# Patient Record
Sex: FEMALE | Race: WHITE | NOT HISPANIC OR LATINO | Employment: OTHER
[De-identification: names, ages, dates, MRNs, and addresses within clinical notes are randomized per-mention and may not be internally consistent; named-entity substitution may affect disease eponyms.]

---

## 2016-06-14 LAB
HPV_EXT - HISTORICAL: NORMAL
PAP SMEAR - HIM PATIENT REPORTED: NORMAL

## 2017-05-09 ENCOUNTER — RECORDS - HEALTHEAST (OUTPATIENT)
Dept: ADMINISTRATIVE | Facility: OTHER | Age: 57
End: 2017-05-09

## 2017-05-22 ENCOUNTER — RECORDS - HEALTHEAST (OUTPATIENT)
Dept: ADMINISTRATIVE | Facility: OTHER | Age: 57
End: 2017-05-22

## 2017-07-10 ENCOUNTER — RECORDS - HEALTHEAST (OUTPATIENT)
Dept: ADMINISTRATIVE | Facility: OTHER | Age: 57
End: 2017-07-10

## 2017-07-14 ENCOUNTER — RECORDS - HEALTHEAST (OUTPATIENT)
Dept: ADMINISTRATIVE | Facility: OTHER | Age: 57
End: 2017-07-14

## 2017-10-25 ENCOUNTER — RECORDS - HEALTHEAST (OUTPATIENT)
Dept: ADMINISTRATIVE | Facility: OTHER | Age: 57
End: 2017-10-25

## 2017-12-08 ENCOUNTER — RECORDS - HEALTHEAST (OUTPATIENT)
Dept: ADMINISTRATIVE | Facility: OTHER | Age: 57
End: 2017-12-08

## 2018-01-17 ENCOUNTER — RECORDS - HEALTHEAST (OUTPATIENT)
Dept: ADMINISTRATIVE | Facility: OTHER | Age: 58
End: 2018-01-17

## 2018-04-03 ENCOUNTER — RECORDS - HEALTHEAST (OUTPATIENT)
Dept: ADMINISTRATIVE | Facility: OTHER | Age: 58
End: 2018-04-03

## 2018-04-03 LAB
HPV_EXT - HISTORICAL: NORMAL
PAP SMEAR - HIM PATIENT REPORTED: NORMAL

## 2018-04-13 ENCOUNTER — RECORDS - HEALTHEAST (OUTPATIENT)
Dept: ADMINISTRATIVE | Facility: OTHER | Age: 58
End: 2018-04-13

## 2018-04-17 ENCOUNTER — RECORDS - HEALTHEAST (OUTPATIENT)
Dept: ADMINISTRATIVE | Facility: OTHER | Age: 58
End: 2018-04-17

## 2018-04-27 ENCOUNTER — RECORDS - HEALTHEAST (OUTPATIENT)
Dept: ADMINISTRATIVE | Facility: OTHER | Age: 58
End: 2018-04-27

## 2018-07-10 ENCOUNTER — RECORDS - HEALTHEAST (OUTPATIENT)
Dept: ADMINISTRATIVE | Facility: OTHER | Age: 58
End: 2018-07-10

## 2018-07-15 ENCOUNTER — RECORDS - HEALTHEAST (OUTPATIENT)
Dept: ADMINISTRATIVE | Facility: OTHER | Age: 58
End: 2018-07-15

## 2018-09-21 ENCOUNTER — RECORDS - HEALTHEAST (OUTPATIENT)
Dept: ADMINISTRATIVE | Facility: OTHER | Age: 58
End: 2018-09-21

## 2018-10-11 ENCOUNTER — RECORDS - HEALTHEAST (OUTPATIENT)
Dept: ADMINISTRATIVE | Facility: OTHER | Age: 58
End: 2018-10-11

## 2019-01-04 ENCOUNTER — OFFICE VISIT - HEALTHEAST (OUTPATIENT)
Dept: FAMILY MEDICINE | Facility: CLINIC | Age: 59
End: 2019-01-04

## 2019-01-04 DIAGNOSIS — N95.1 MENOPAUSAL AND FEMALE CLIMACTERIC STATES: ICD-10-CM

## 2019-01-04 DIAGNOSIS — M54.41 CHRONIC BILATERAL LOW BACK PAIN WITH RIGHT-SIDED SCIATICA: ICD-10-CM

## 2019-01-04 DIAGNOSIS — Q52.0 CONGENITAL ABSENCE OF VAGINA: ICD-10-CM

## 2019-01-04 DIAGNOSIS — K59.09 CHRONIC CONSTIPATION: ICD-10-CM

## 2019-01-04 DIAGNOSIS — M79.641 PAIN OF RIGHT HAND: ICD-10-CM

## 2019-01-04 DIAGNOSIS — I73.00 RAYNAUD'S DISEASE WITHOUT GANGRENE: ICD-10-CM

## 2019-01-04 DIAGNOSIS — G89.29 CHRONIC BILATERAL LOW BACK PAIN WITH RIGHT-SIDED SCIATICA: ICD-10-CM

## 2019-01-04 LAB
AMPHETAMINES UR QL SCN: NORMAL
BARBITURATES UR QL: NORMAL
BENZODIAZ UR QL: NORMAL
CANNABINOIDS UR QL SCN: NORMAL
COCAINE UR QL: NORMAL
CREAT UR-MCNC: 86.1 MG/DL
METHADONE UR QL SCN: NORMAL
OPIATES UR QL SCN: NORMAL
OXYCODONE UR QL: NORMAL
PCP UR QL SCN: NORMAL

## 2019-01-04 RX ORDER — GABAPENTIN 100 MG/1
200-300 CAPSULE ORAL AT BEDTIME
Refills: 0 | Status: SHIPPED | COMMUNITY
Start: 2018-12-05 | End: 2021-08-16

## 2019-01-04 ASSESSMENT — MIFFLIN-ST. JEOR: SCORE: 1139.51

## 2019-01-09 ENCOUNTER — COMMUNICATION - HEALTHEAST (OUTPATIENT)
Dept: FAMILY MEDICINE | Facility: CLINIC | Age: 59
End: 2019-01-09

## 2019-01-24 ENCOUNTER — AMBULATORY - HEALTHEAST (OUTPATIENT)
Dept: LAB | Facility: HOSPITAL | Age: 59
End: 2019-01-24

## 2019-01-24 DIAGNOSIS — M54.89 OTHER BACK PAIN, UNSPECIFIED CHRONICITY: ICD-10-CM

## 2019-01-24 DIAGNOSIS — M06.4 INFLAMMATORY POLYARTHROPATHY (H): ICD-10-CM

## 2019-01-24 LAB
C REACTIVE PROTEIN LHE: <0.1 MG/DL (ref 0–0.8)
ERYTHROCYTE [DISTWIDTH] IN BLOOD BY AUTOMATED COUNT: 11.9 % (ref 11–14.5)
ERYTHROCYTE [SEDIMENTATION RATE] IN BLOOD BY WESTERGREN METHOD: 4 MM/HR (ref 0–20)
HCT VFR BLD AUTO: 37.2 % (ref 35–47)
HGB BLD-MCNC: 13.2 G/DL (ref 12–16)
MCH RBC QN AUTO: 32.4 PG (ref 27–34)
MCHC RBC AUTO-ENTMCNC: 35.5 G/DL (ref 32–36)
MCV RBC AUTO: 91 FL (ref 80–100)
PLATELET # BLD AUTO: 274 THOU/UL (ref 140–440)
PMV BLD AUTO: 9.9 FL (ref 8.5–12.5)
RBC # BLD AUTO: 4.08 MILL/UL (ref 3.8–5.4)
WBC: 6 THOU/UL (ref 4–11)

## 2019-01-31 ENCOUNTER — RECORDS - HEALTHEAST (OUTPATIENT)
Dept: ADMINISTRATIVE | Facility: OTHER | Age: 59
End: 2019-01-31

## 2019-01-31 LAB
B LOCUS: NORMAL
B27TEST METHOD: NORMAL

## 2019-02-11 ENCOUNTER — AMBULATORY - HEALTHEAST (OUTPATIENT)
Dept: FAMILY MEDICINE | Facility: CLINIC | Age: 59
End: 2019-02-11

## 2019-02-11 DIAGNOSIS — R92.8 ABNORMAL MAMMOGRAM: ICD-10-CM

## 2019-02-18 ENCOUNTER — COMMUNICATION - HEALTHEAST (OUTPATIENT)
Dept: FAMILY MEDICINE | Facility: CLINIC | Age: 59
End: 2019-02-18

## 2019-02-19 ENCOUNTER — RECORDS - HEALTHEAST (OUTPATIENT)
Dept: HEALTH INFORMATION MANAGEMENT | Facility: CLINIC | Age: 59
End: 2019-02-19

## 2019-03-08 ENCOUNTER — AMBULATORY - HEALTHEAST (OUTPATIENT)
Dept: LAB | Facility: HOSPITAL | Age: 59
End: 2019-03-08

## 2019-03-08 DIAGNOSIS — M06.4 INFLAMMATORY POLYARTHROPATHY (H): ICD-10-CM

## 2019-03-08 LAB — URATE SERPL-MCNC: 3.9 MG/DL (ref 2–7.5)

## 2019-03-11 LAB — ANA SER QL: 3.6 U

## 2019-03-12 LAB — DNA (DS) ANTIBODY - HISTORICAL: 7 IU

## 2019-03-19 LAB
JO-1 AUTOANTIBODIES - HISTORICAL: 3 EU
SCL-70 AUTOANTIBODIES - HISTORICAL: 1 EU
SM (SMITH AUTOANTIBODIES - HISTORICAL: 20 EU
SM/RNP AUTOANTIBODIES - HISTORICAL: 2 EU
SS-A/RO AUTOANTIBODIES - HISTORICAL: 12 EU
SS-B/LA AUTOANTIBODIES - HISTORICAL: 2 EU

## 2019-03-22 ENCOUNTER — COMMUNICATION - HEALTHEAST (OUTPATIENT)
Dept: FAMILY MEDICINE | Facility: CLINIC | Age: 59
End: 2019-03-22

## 2019-03-22 DIAGNOSIS — G89.29 CHRONIC BILATERAL LOW BACK PAIN WITH RIGHT-SIDED SCIATICA: ICD-10-CM

## 2019-03-22 DIAGNOSIS — M54.41 CHRONIC BILATERAL LOW BACK PAIN WITH RIGHT-SIDED SCIATICA: ICD-10-CM

## 2019-04-11 ENCOUNTER — HOSPITAL ENCOUNTER (OUTPATIENT)
Dept: MAMMOGRAPHY | Facility: CLINIC | Age: 59
Discharge: HOME | End: 2019-04-11

## 2019-04-11 DIAGNOSIS — R92.8 ABNORMAL MAMMOGRAM: ICD-10-CM

## 2019-04-12 ENCOUNTER — RECORDS - HEALTHEAST (OUTPATIENT)
Dept: RADIOLOGY | Facility: CLINIC | Age: 59
End: 2019-04-12

## 2019-04-12 ENCOUNTER — HOSPITAL ENCOUNTER (OUTPATIENT)
Dept: MAMMOGRAPHY | Facility: CLINIC | Age: 59
Discharge: HOME | End: 2019-04-12

## 2019-04-15 ENCOUNTER — AMBULATORY - HEALTHEAST (OUTPATIENT)
Dept: FAMILY MEDICINE | Facility: CLINIC | Age: 59
End: 2019-04-15

## 2019-04-15 DIAGNOSIS — R92.8 ABNORMAL MAMMOGRAM: ICD-10-CM

## 2019-04-16 ENCOUNTER — COMMUNICATION - HEALTHEAST (OUTPATIENT)
Dept: FAMILY MEDICINE | Facility: CLINIC | Age: 59
End: 2019-04-16

## 2019-05-13 ENCOUNTER — OFFICE VISIT - HEALTHEAST (OUTPATIENT)
Dept: FAMILY MEDICINE | Facility: CLINIC | Age: 59
End: 2019-05-13

## 2019-05-13 DIAGNOSIS — R92.8 ABNORMAL MAMMOGRAM: ICD-10-CM

## 2019-05-13 DIAGNOSIS — G89.29 CHRONIC BILATERAL LOW BACK PAIN WITH RIGHT-SIDED SCIATICA: ICD-10-CM

## 2019-05-13 DIAGNOSIS — M54.41 CHRONIC BILATERAL LOW BACK PAIN WITH RIGHT-SIDED SCIATICA: ICD-10-CM

## 2019-05-13 DIAGNOSIS — Z12.11 SCREEN FOR COLON CANCER: ICD-10-CM

## 2019-05-13 DIAGNOSIS — R42 VERTIGO: ICD-10-CM

## 2019-05-13 ASSESSMENT — MIFFLIN-ST. JEOR: SCORE: 1151.02

## 2019-05-16 ENCOUNTER — OFFICE VISIT - HEALTHEAST (OUTPATIENT)
Dept: FAMILY MEDICINE | Facility: CLINIC | Age: 59
End: 2019-05-16

## 2019-05-16 DIAGNOSIS — Z12.11 SPECIAL SCREENING FOR MALIGNANT NEOPLASMS, COLON: ICD-10-CM

## 2019-05-16 DIAGNOSIS — L81.4 LENTIGO: ICD-10-CM

## 2019-05-16 DIAGNOSIS — Z00.00 ANNUAL PHYSICAL EXAM: ICD-10-CM

## 2019-05-16 LAB
ALBUMIN SERPL-MCNC: 4.2 G/DL (ref 3.5–5)
ALP SERPL-CCNC: 73 U/L (ref 45–120)
ALT SERPL W P-5'-P-CCNC: <9 U/L (ref 0–45)
ANION GAP SERPL CALCULATED.3IONS-SCNC: 11 MMOL/L (ref 5–18)
AST SERPL W P-5'-P-CCNC: 19 U/L (ref 0–40)
BILIRUB SERPL-MCNC: 0.4 MG/DL (ref 0–1)
BUN SERPL-MCNC: 13 MG/DL (ref 8–22)
CALCIUM SERPL-MCNC: 9.6 MG/DL (ref 8.5–10.5)
CHLORIDE BLD-SCNC: 105 MMOL/L (ref 98–107)
CHOLEST SERPL-MCNC: 199 MG/DL
CO2 SERPL-SCNC: 23 MMOL/L (ref 22–31)
CREAT SERPL-MCNC: 0.69 MG/DL (ref 0.6–1.1)
FASTING STATUS PATIENT QL REPORTED: YES
GFR SERPL CREATININE-BSD FRML MDRD: >60 ML/MIN/1.73M2
GLUCOSE BLD-MCNC: 94 MG/DL (ref 70–125)
HDLC SERPL-MCNC: 82 MG/DL
LDLC SERPL CALC-MCNC: 104 MG/DL
POTASSIUM BLD-SCNC: 4.1 MMOL/L (ref 3.5–5)
PROT SERPL-MCNC: 6.7 G/DL (ref 6–8)
SODIUM SERPL-SCNC: 139 MMOL/L (ref 136–145)
TRIGL SERPL-MCNC: 65 MG/DL

## 2019-05-16 ASSESSMENT — MIFFLIN-ST. JEOR: SCORE: 1151.02

## 2019-05-20 ENCOUNTER — COMMUNICATION - HEALTHEAST (OUTPATIENT)
Dept: FAMILY MEDICINE | Facility: CLINIC | Age: 59
End: 2019-05-20

## 2019-06-20 ENCOUNTER — RECORDS - HEALTHEAST (OUTPATIENT)
Dept: ADMINISTRATIVE | Facility: OTHER | Age: 59
End: 2019-06-20

## 2019-06-27 ENCOUNTER — COMMUNICATION - HEALTHEAST (OUTPATIENT)
Dept: FAMILY MEDICINE | Facility: CLINIC | Age: 59
End: 2019-06-27

## 2019-06-27 DIAGNOSIS — G89.29 CHRONIC BILATERAL LOW BACK PAIN WITH RIGHT-SIDED SCIATICA: ICD-10-CM

## 2019-06-27 DIAGNOSIS — M54.41 CHRONIC BILATERAL LOW BACK PAIN WITH RIGHT-SIDED SCIATICA: ICD-10-CM

## 2019-08-08 ENCOUNTER — COMMUNICATION - HEALTHEAST (OUTPATIENT)
Dept: FAMILY MEDICINE | Facility: CLINIC | Age: 59
End: 2019-08-08

## 2019-08-08 DIAGNOSIS — G89.29 CHRONIC BILATERAL LOW BACK PAIN WITH RIGHT-SIDED SCIATICA: ICD-10-CM

## 2019-08-08 DIAGNOSIS — M54.41 CHRONIC BILATERAL LOW BACK PAIN WITH RIGHT-SIDED SCIATICA: ICD-10-CM

## 2019-09-05 ENCOUNTER — COMMUNICATION - HEALTHEAST (OUTPATIENT)
Dept: FAMILY MEDICINE | Facility: CLINIC | Age: 59
End: 2019-09-05

## 2019-09-05 DIAGNOSIS — F32.A DEPRESSION: ICD-10-CM

## 2019-09-18 ENCOUNTER — COMMUNICATION - HEALTHEAST (OUTPATIENT)
Dept: FAMILY MEDICINE | Facility: CLINIC | Age: 59
End: 2019-09-18

## 2019-09-18 DIAGNOSIS — M54.41 CHRONIC BILATERAL LOW BACK PAIN WITH RIGHT-SIDED SCIATICA: ICD-10-CM

## 2019-09-18 DIAGNOSIS — G89.29 CHRONIC BILATERAL LOW BACK PAIN WITH RIGHT-SIDED SCIATICA: ICD-10-CM

## 2019-10-25 ENCOUNTER — COMMUNICATION - HEALTHEAST (OUTPATIENT)
Dept: FAMILY MEDICINE | Facility: CLINIC | Age: 59
End: 2019-10-25

## 2019-10-25 DIAGNOSIS — M54.41 CHRONIC BILATERAL LOW BACK PAIN WITH RIGHT-SIDED SCIATICA: ICD-10-CM

## 2019-10-25 DIAGNOSIS — G89.29 CHRONIC BILATERAL LOW BACK PAIN WITH RIGHT-SIDED SCIATICA: ICD-10-CM

## 2019-11-01 ENCOUNTER — COMMUNICATION - HEALTHEAST (OUTPATIENT)
Dept: FAMILY MEDICINE | Facility: CLINIC | Age: 59
End: 2019-11-01

## 2019-11-07 ENCOUNTER — OFFICE VISIT - HEALTHEAST (OUTPATIENT)
Dept: FAMILY MEDICINE | Facility: CLINIC | Age: 59
End: 2019-11-07

## 2019-11-07 DIAGNOSIS — Z79.899 CONTROLLED SUBSTANCE AGREEMENT SIGNED: ICD-10-CM

## 2019-11-07 DIAGNOSIS — M54.41 CHRONIC BILATERAL LOW BACK PAIN WITH RIGHT-SIDED SCIATICA: ICD-10-CM

## 2019-11-07 DIAGNOSIS — R21 RASH AND NONSPECIFIC SKIN ERUPTION: ICD-10-CM

## 2019-11-07 DIAGNOSIS — G89.29 CHRONIC BILATERAL LOW BACK PAIN WITH RIGHT-SIDED SCIATICA: ICD-10-CM

## 2019-11-07 LAB
AMPHETAMINES UR QL SCN: NORMAL
BARBITURATES UR QL: NORMAL
BENZODIAZ UR QL: NORMAL
CANNABINOIDS UR QL SCN: NORMAL
COCAINE UR QL: NORMAL
CREAT UR-MCNC: 12.6 MG/DL
METHADONE UR QL SCN: NORMAL
OPIATES UR QL SCN: NORMAL
OXYCODONE UR QL: NORMAL
PCP UR QL SCN: NORMAL

## 2019-11-07 ASSESSMENT — ANXIETY QUESTIONNAIRES
4. TROUBLE RELAXING: NOT AT ALL
2. NOT BEING ABLE TO STOP OR CONTROL WORRYING: NOT AT ALL
6. BECOMING EASILY ANNOYED OR IRRITABLE: NOT AT ALL
7. FEELING AFRAID AS IF SOMETHING AWFUL MIGHT HAPPEN: NOT AT ALL
3. WORRYING TOO MUCH ABOUT DIFFERENT THINGS: NOT AT ALL
5. BEING SO RESTLESS THAT IT IS HARD TO SIT STILL: NOT AT ALL
1. FEELING NERVOUS, ANXIOUS, OR ON EDGE: NOT AT ALL
GAD7 TOTAL SCORE: 0

## 2019-11-07 ASSESSMENT — PATIENT HEALTH QUESTIONNAIRE - PHQ9: SUM OF ALL RESPONSES TO PHQ QUESTIONS 1-9: 1

## 2019-12-03 ENCOUNTER — COMMUNICATION - HEALTHEAST (OUTPATIENT)
Dept: FAMILY MEDICINE | Facility: CLINIC | Age: 59
End: 2019-12-03

## 2019-12-03 DIAGNOSIS — G89.29 CHRONIC BILATERAL LOW BACK PAIN WITH RIGHT-SIDED SCIATICA: ICD-10-CM

## 2019-12-03 DIAGNOSIS — M54.41 CHRONIC BILATERAL LOW BACK PAIN WITH RIGHT-SIDED SCIATICA: ICD-10-CM

## 2020-01-09 ENCOUNTER — COMMUNICATION - HEALTHEAST (OUTPATIENT)
Dept: FAMILY MEDICINE | Facility: CLINIC | Age: 60
End: 2020-01-09

## 2020-01-09 DIAGNOSIS — M54.41 CHRONIC BILATERAL LOW BACK PAIN WITH RIGHT-SIDED SCIATICA: ICD-10-CM

## 2020-01-09 DIAGNOSIS — G89.29 CHRONIC BILATERAL LOW BACK PAIN WITH RIGHT-SIDED SCIATICA: ICD-10-CM

## 2020-02-11 ENCOUNTER — COMMUNICATION - HEALTHEAST (OUTPATIENT)
Dept: FAMILY MEDICINE | Facility: CLINIC | Age: 60
End: 2020-02-11

## 2020-02-11 DIAGNOSIS — M54.41 CHRONIC BILATERAL LOW BACK PAIN WITH RIGHT-SIDED SCIATICA: ICD-10-CM

## 2020-02-11 DIAGNOSIS — G89.29 CHRONIC BILATERAL LOW BACK PAIN WITH RIGHT-SIDED SCIATICA: ICD-10-CM

## 2020-02-26 ENCOUNTER — COMMUNICATION - HEALTHEAST (OUTPATIENT)
Dept: FAMILY MEDICINE | Facility: CLINIC | Age: 60
End: 2020-02-26

## 2020-03-16 ENCOUNTER — COMMUNICATION - HEALTHEAST (OUTPATIENT)
Dept: FAMILY MEDICINE | Facility: CLINIC | Age: 60
End: 2020-03-16

## 2020-03-16 DIAGNOSIS — M54.41 CHRONIC BILATERAL LOW BACK PAIN WITH RIGHT-SIDED SCIATICA: ICD-10-CM

## 2020-03-16 DIAGNOSIS — G89.29 CHRONIC BILATERAL LOW BACK PAIN WITH RIGHT-SIDED SCIATICA: ICD-10-CM

## 2020-04-02 ENCOUNTER — RECORDS - HEALTHEAST (OUTPATIENT)
Dept: ADMINISTRATIVE | Facility: OTHER | Age: 60
End: 2020-04-02

## 2020-04-20 ENCOUNTER — COMMUNICATION - HEALTHEAST (OUTPATIENT)
Dept: FAMILY MEDICINE | Facility: CLINIC | Age: 60
End: 2020-04-20

## 2020-04-20 DIAGNOSIS — M54.41 CHRONIC BILATERAL LOW BACK PAIN WITH RIGHT-SIDED SCIATICA: ICD-10-CM

## 2020-04-20 DIAGNOSIS — G89.29 CHRONIC BILATERAL LOW BACK PAIN WITH RIGHT-SIDED SCIATICA: ICD-10-CM

## 2020-05-07 ENCOUNTER — RECORDS - HEALTHEAST (OUTPATIENT)
Dept: ADMINISTRATIVE | Facility: OTHER | Age: 60
End: 2020-05-07

## 2020-05-26 ENCOUNTER — COMMUNICATION - HEALTHEAST (OUTPATIENT)
Dept: FAMILY MEDICINE | Facility: CLINIC | Age: 60
End: 2020-05-26

## 2020-05-26 DIAGNOSIS — M54.41 CHRONIC BILATERAL LOW BACK PAIN WITH RIGHT-SIDED SCIATICA: ICD-10-CM

## 2020-05-26 DIAGNOSIS — G89.29 CHRONIC BILATERAL LOW BACK PAIN WITH RIGHT-SIDED SCIATICA: ICD-10-CM

## 2020-06-17 ENCOUNTER — COMMUNICATION - HEALTHEAST (OUTPATIENT)
Dept: FAMILY MEDICINE | Facility: CLINIC | Age: 60
End: 2020-06-17

## 2020-06-17 DIAGNOSIS — F32.A DEPRESSION: ICD-10-CM

## 2020-06-26 ENCOUNTER — COMMUNICATION - HEALTHEAST (OUTPATIENT)
Dept: FAMILY MEDICINE | Facility: CLINIC | Age: 60
End: 2020-06-26

## 2020-06-26 DIAGNOSIS — M54.41 CHRONIC BILATERAL LOW BACK PAIN WITH RIGHT-SIDED SCIATICA: ICD-10-CM

## 2020-06-26 DIAGNOSIS — G89.29 CHRONIC BILATERAL LOW BACK PAIN WITH RIGHT-SIDED SCIATICA: ICD-10-CM

## 2020-07-02 ENCOUNTER — OFFICE VISIT - HEALTHEAST (OUTPATIENT)
Dept: FAMILY MEDICINE | Facility: CLINIC | Age: 60
End: 2020-07-02

## 2020-07-02 DIAGNOSIS — F41.9 ANXIETY: ICD-10-CM

## 2020-07-02 DIAGNOSIS — G89.29 CHRONIC BILATERAL LOW BACK PAIN WITH RIGHT-SIDED SCIATICA: ICD-10-CM

## 2020-07-02 DIAGNOSIS — M25.50 POLYARTHRALGIA: ICD-10-CM

## 2020-07-02 DIAGNOSIS — I73.00 RAYNAUD'S DISEASE WITHOUT GANGRENE: ICD-10-CM

## 2020-07-02 DIAGNOSIS — M54.41 CHRONIC BILATERAL LOW BACK PAIN WITH RIGHT-SIDED SCIATICA: ICD-10-CM

## 2020-07-02 RX ORDER — ESTRADIOL 0.05 MG/D
1 PATCH, EXTENDED RELEASE TRANSDERMAL
Status: SHIPPED | COMMUNITY
Start: 2020-07-02 | End: 2024-03-25

## 2020-07-02 ASSESSMENT — ANXIETY QUESTIONNAIRES
GAD7 TOTAL SCORE: 0
3. WORRYING TOO MUCH ABOUT DIFFERENT THINGS: NOT AT ALL
2. NOT BEING ABLE TO STOP OR CONTROL WORRYING: NOT AT ALL
1. FEELING NERVOUS, ANXIOUS, OR ON EDGE: NOT AT ALL
6. BECOMING EASILY ANNOYED OR IRRITABLE: NOT AT ALL
7. FEELING AFRAID AS IF SOMETHING AWFUL MIGHT HAPPEN: NOT AT ALL
4. TROUBLE RELAXING: NOT AT ALL
5. BEING SO RESTLESS THAT IT IS HARD TO SIT STILL: NOT AT ALL

## 2020-07-02 ASSESSMENT — PATIENT HEALTH QUESTIONNAIRE - PHQ9: SUM OF ALL RESPONSES TO PHQ QUESTIONS 1-9: 0

## 2020-07-03 ENCOUNTER — COMMUNICATION - HEALTHEAST (OUTPATIENT)
Dept: FAMILY MEDICINE | Facility: CLINIC | Age: 60
End: 2020-07-03

## 2020-07-29 ENCOUNTER — COMMUNICATION - HEALTHEAST (OUTPATIENT)
Dept: FAMILY MEDICINE | Facility: CLINIC | Age: 60
End: 2020-07-29

## 2020-07-29 DIAGNOSIS — F32.A DEPRESSION: ICD-10-CM

## 2020-07-29 DIAGNOSIS — M54.41 CHRONIC BILATERAL LOW BACK PAIN WITH RIGHT-SIDED SCIATICA: ICD-10-CM

## 2020-07-29 DIAGNOSIS — G89.29 CHRONIC BILATERAL LOW BACK PAIN WITH RIGHT-SIDED SCIATICA: ICD-10-CM

## 2020-07-30 ENCOUNTER — RECORDS - HEALTHEAST (OUTPATIENT)
Dept: ADMINISTRATIVE | Facility: OTHER | Age: 60
End: 2020-07-30

## 2020-08-27 ENCOUNTER — OFFICE VISIT - HEALTHEAST (OUTPATIENT)
Dept: RHEUMATOLOGY | Facility: CLINIC | Age: 60
End: 2020-08-27

## 2020-09-03 ENCOUNTER — COMMUNICATION - HEALTHEAST (OUTPATIENT)
Dept: FAMILY MEDICINE | Facility: CLINIC | Age: 60
End: 2020-09-03

## 2020-09-03 DIAGNOSIS — M54.41 CHRONIC BILATERAL LOW BACK PAIN WITH RIGHT-SIDED SCIATICA: ICD-10-CM

## 2020-09-03 DIAGNOSIS — G89.29 CHRONIC BILATERAL LOW BACK PAIN WITH RIGHT-SIDED SCIATICA: ICD-10-CM

## 2020-09-11 ENCOUNTER — OFFICE VISIT - HEALTHEAST (OUTPATIENT)
Dept: FAMILY MEDICINE | Facility: CLINIC | Age: 60
End: 2020-09-11

## 2020-09-11 DIAGNOSIS — Z98.890 PONV (POSTOPERATIVE NAUSEA AND VOMITING): ICD-10-CM

## 2020-09-11 DIAGNOSIS — Z01.818 PREOP GENERAL PHYSICAL EXAM: ICD-10-CM

## 2020-09-11 DIAGNOSIS — K59.09 CHRONIC CONSTIPATION: ICD-10-CM

## 2020-09-11 DIAGNOSIS — Z79.890 ON HORMONE REPLACEMENT THERAPY: ICD-10-CM

## 2020-09-11 DIAGNOSIS — Z79.899 CONTROLLED SUBSTANCE AGREEMENT SIGNED: ICD-10-CM

## 2020-09-11 DIAGNOSIS — R11.2 PONV (POSTOPERATIVE NAUSEA AND VOMITING): ICD-10-CM

## 2020-09-11 LAB
AMPHETAMINES UR QL SCN: NORMAL
BARBITURATES UR QL: NORMAL
BENZODIAZ UR QL: NORMAL
CANNABINOIDS UR QL SCN: NORMAL
COCAINE UR QL: NORMAL
CREAT UR-MCNC: 13.6 MG/DL
METHADONE UR QL SCN: NORMAL
OPIATES UR QL SCN: NORMAL
OXYCODONE UR QL: NORMAL
PCP UR QL SCN: NORMAL

## 2020-09-11 ASSESSMENT — MIFFLIN-ST. JEOR: SCORE: 1158.21

## 2020-09-18 ENCOUNTER — RECORDS - HEALTHEAST (OUTPATIENT)
Dept: ADMINISTRATIVE | Facility: OTHER | Age: 60
End: 2020-09-18

## 2020-09-30 ENCOUNTER — RECORDS - HEALTHEAST (OUTPATIENT)
Dept: ADMINISTRATIVE | Facility: OTHER | Age: 60
End: 2020-09-30

## 2020-10-06 ENCOUNTER — COMMUNICATION - HEALTHEAST (OUTPATIENT)
Dept: FAMILY MEDICINE | Facility: CLINIC | Age: 60
End: 2020-10-06

## 2020-10-06 DIAGNOSIS — M54.41 CHRONIC BILATERAL LOW BACK PAIN WITH RIGHT-SIDED SCIATICA: ICD-10-CM

## 2020-10-06 DIAGNOSIS — G89.29 CHRONIC BILATERAL LOW BACK PAIN WITH RIGHT-SIDED SCIATICA: ICD-10-CM

## 2020-10-07 ENCOUNTER — COMMUNICATION - HEALTHEAST (OUTPATIENT)
Dept: FAMILY MEDICINE | Facility: CLINIC | Age: 60
End: 2020-10-07

## 2020-10-22 ENCOUNTER — SURGERY - HEALTHEAST (OUTPATIENT)
Dept: SURGERY | Facility: HOSPITAL | Age: 60
End: 2020-10-22

## 2020-10-22 ENCOUNTER — ANESTHESIA - HEALTHEAST (OUTPATIENT)
Dept: SURGERY | Facility: HOSPITAL | Age: 60
End: 2020-10-22

## 2020-10-22 ASSESSMENT — MIFFLIN-ST. JEOR
SCORE: 1146.48
SCORE: 1146.48

## 2020-10-23 ENCOUNTER — COMMUNICATION - HEALTHEAST (OUTPATIENT)
Dept: SCHEDULING | Facility: CLINIC | Age: 60
End: 2020-10-23

## 2020-10-24 ASSESSMENT — MIFFLIN-ST. JEOR: SCORE: 1167.8

## 2020-10-25 ASSESSMENT — MIFFLIN-ST. JEOR: SCORE: 1180.96

## 2020-10-26 ENCOUNTER — COMMUNICATION - HEALTHEAST (OUTPATIENT)
Dept: INFECTIOUS DISEASES | Facility: CLINIC | Age: 60
End: 2020-10-26

## 2020-10-27 ENCOUNTER — RECORDS - HEALTHEAST (OUTPATIENT)
Dept: ADMINISTRATIVE | Facility: OTHER | Age: 60
End: 2020-10-27

## 2020-10-29 ENCOUNTER — RECORDS - HEALTHEAST (OUTPATIENT)
Dept: LAB | Facility: CLINIC | Age: 60
End: 2020-10-29

## 2020-10-29 LAB
ANION GAP SERPL CALCULATED.3IONS-SCNC: 11 MMOL/L (ref 5–18)
BASOPHILS # BLD AUTO: 0 THOU/UL (ref 0–0.2)
BASOPHILS NFR BLD AUTO: 1 % (ref 0–2)
BUN SERPL-MCNC: 10 MG/DL (ref 8–22)
C REACTIVE PROTEIN LHE: 0.5 MG/DL (ref 0–0.8)
CALCIUM SERPL-MCNC: 9.8 MG/DL (ref 8.5–10.5)
CHLORIDE BLD-SCNC: 102 MMOL/L (ref 98–107)
CO2 SERPL-SCNC: 25 MMOL/L (ref 22–31)
CREAT SERPL-MCNC: 0.73 MG/DL (ref 0.6–1.1)
EOSINOPHIL # BLD AUTO: 0.1 THOU/UL (ref 0–0.4)
EOSINOPHIL NFR BLD AUTO: 2 % (ref 0–6)
ERYTHROCYTE [DISTWIDTH] IN BLOOD BY AUTOMATED COUNT: 12.1 % (ref 11–14.5)
GFR SERPL CREATININE-BSD FRML MDRD: >60 ML/MIN/1.73M2
GLUCOSE BLD-MCNC: 84 MG/DL (ref 70–125)
HCT VFR BLD AUTO: 35.8 % (ref 35–47)
HGB BLD-MCNC: 12.2 G/DL (ref 12–16)
IMM GRANULOCYTES # BLD: 0 THOU/UL
IMM GRANULOCYTES NFR BLD: 0 %
LYMPHOCYTES # BLD AUTO: 1.9 THOU/UL (ref 0.8–4.4)
LYMPHOCYTES NFR BLD AUTO: 33 % (ref 20–40)
MCH RBC QN AUTO: 31.4 PG (ref 27–34)
MCHC RBC AUTO-ENTMCNC: 34.1 G/DL (ref 32–36)
MCV RBC AUTO: 92 FL (ref 80–100)
MONOCYTES # BLD AUTO: 0.5 THOU/UL (ref 0–0.9)
MONOCYTES NFR BLD AUTO: 9 % (ref 2–10)
NEUTROPHILS # BLD AUTO: 3.3 THOU/UL (ref 2–7.7)
NEUTROPHILS NFR BLD AUTO: 56 % (ref 50–70)
PLATELET # BLD AUTO: 273 THOU/UL (ref 140–440)
PMV BLD AUTO: 10.2 FL (ref 8.5–12.5)
POTASSIUM BLD-SCNC: 4.5 MMOL/L (ref 3.5–5)
RBC # BLD AUTO: 3.89 MILL/UL (ref 3.8–5.4)
SODIUM SERPL-SCNC: 138 MMOL/L (ref 136–145)
WBC: 5.9 THOU/UL (ref 4–11)

## 2020-10-30 ENCOUNTER — RECORDS - HEALTHEAST (OUTPATIENT)
Dept: ADMINISTRATIVE | Facility: OTHER | Age: 60
End: 2020-10-30

## 2020-11-05 ENCOUNTER — RECORDS - HEALTHEAST (OUTPATIENT)
Dept: LAB | Facility: CLINIC | Age: 60
End: 2020-11-05

## 2020-11-05 LAB
ANION GAP SERPL CALCULATED.3IONS-SCNC: 10 MMOL/L (ref 5–18)
BASOPHILS # BLD AUTO: 0.1 THOU/UL (ref 0–0.2)
BASOPHILS NFR BLD AUTO: 1 % (ref 0–2)
BUN SERPL-MCNC: 13 MG/DL (ref 8–22)
C REACTIVE PROTEIN LHE: 0.3 MG/DL (ref 0–0.8)
CALCIUM SERPL-MCNC: 9.5 MG/DL (ref 8.5–10.5)
CHLORIDE BLD-SCNC: 102 MMOL/L (ref 98–107)
CO2 SERPL-SCNC: 25 MMOL/L (ref 22–31)
CREAT SERPL-MCNC: 0.73 MG/DL (ref 0.6–1.1)
EOSINOPHIL # BLD AUTO: 0.1 THOU/UL (ref 0–0.4)
EOSINOPHIL NFR BLD AUTO: 2 % (ref 0–6)
ERYTHROCYTE [DISTWIDTH] IN BLOOD BY AUTOMATED COUNT: 12.1 % (ref 11–14.5)
GFR SERPL CREATININE-BSD FRML MDRD: >60 ML/MIN/1.73M2
GLUCOSE BLD-MCNC: 80 MG/DL (ref 70–125)
HCT VFR BLD AUTO: 34.7 % (ref 35–47)
HGB BLD-MCNC: 11.9 G/DL (ref 12–16)
IMM GRANULOCYTES # BLD: 0 THOU/UL
IMM GRANULOCYTES NFR BLD: 1 %
LYMPHOCYTES # BLD AUTO: 2 THOU/UL (ref 0.8–4.4)
LYMPHOCYTES NFR BLD AUTO: 31 % (ref 20–40)
MCH RBC QN AUTO: 31.6 PG (ref 27–34)
MCHC RBC AUTO-ENTMCNC: 34.3 G/DL (ref 32–36)
MCV RBC AUTO: 92 FL (ref 80–100)
MONOCYTES # BLD AUTO: 0.6 THOU/UL (ref 0–0.9)
MONOCYTES NFR BLD AUTO: 9 % (ref 2–10)
NEUTROPHILS # BLD AUTO: 3.8 THOU/UL (ref 2–7.7)
NEUTROPHILS NFR BLD AUTO: 57 % (ref 50–70)
PLATELET # BLD AUTO: 292 THOU/UL (ref 140–440)
PMV BLD AUTO: 10.4 FL (ref 8.5–12.5)
POTASSIUM BLD-SCNC: 4.3 MMOL/L (ref 3.5–5)
RBC # BLD AUTO: 3.76 MILL/UL (ref 3.8–5.4)
SODIUM SERPL-SCNC: 137 MMOL/L (ref 136–145)
WBC: 6.6 THOU/UL (ref 4–11)

## 2020-11-10 ENCOUNTER — RECORDS - HEALTHEAST (OUTPATIENT)
Dept: ADMINISTRATIVE | Facility: OTHER | Age: 60
End: 2020-11-10

## 2020-11-11 ENCOUNTER — COMMUNICATION - HEALTHEAST (OUTPATIENT)
Dept: FAMILY MEDICINE | Facility: CLINIC | Age: 60
End: 2020-11-11

## 2020-11-11 DIAGNOSIS — M00.9 PYOGENIC ARTHRITIS OF RIGHT HAND, DUE TO UNSPECIFIED ORGANISM (H): ICD-10-CM

## 2020-11-12 ENCOUNTER — RECORDS - HEALTHEAST (OUTPATIENT)
Dept: LAB | Facility: CLINIC | Age: 60
End: 2020-11-12

## 2020-11-12 ENCOUNTER — COMMUNICATION - HEALTHEAST (OUTPATIENT)
Dept: PHARMACY | Facility: HOSPITAL | Age: 60
End: 2020-11-12

## 2020-11-12 LAB
ANION GAP SERPL CALCULATED.3IONS-SCNC: 11 MMOL/L (ref 5–18)
BASOPHILS # BLD AUTO: 0 THOU/UL (ref 0–0.2)
BASOPHILS NFR BLD AUTO: 1 % (ref 0–2)
BUN SERPL-MCNC: 12 MG/DL (ref 8–22)
C REACTIVE PROTEIN LHE: 0.2 MG/DL (ref 0–0.8)
CALCIUM SERPL-MCNC: 9.4 MG/DL (ref 8.5–10.5)
CHLORIDE BLD-SCNC: 102 MMOL/L (ref 98–107)
CO2 SERPL-SCNC: 25 MMOL/L (ref 22–31)
CREAT SERPL-MCNC: 0.73 MG/DL (ref 0.6–1.1)
EOSINOPHIL # BLD AUTO: 0.1 THOU/UL (ref 0–0.4)
EOSINOPHIL NFR BLD AUTO: 2 % (ref 0–6)
ERYTHROCYTE [DISTWIDTH] IN BLOOD BY AUTOMATED COUNT: 11.9 % (ref 11–14.5)
GFR SERPL CREATININE-BSD FRML MDRD: >60 ML/MIN/1.73M2
GLUCOSE BLD-MCNC: 94 MG/DL (ref 70–125)
HCT VFR BLD AUTO: 33.9 % (ref 35–47)
HGB BLD-MCNC: 12 G/DL (ref 12–16)
IMM GRANULOCYTES # BLD: 0 THOU/UL
IMM GRANULOCYTES NFR BLD: 0 %
LYMPHOCYTES # BLD AUTO: 1.9 THOU/UL (ref 0.8–4.4)
LYMPHOCYTES NFR BLD AUTO: 40 % (ref 20–40)
MCH RBC QN AUTO: 31.8 PG (ref 27–34)
MCHC RBC AUTO-ENTMCNC: 35.4 G/DL (ref 32–36)
MCV RBC AUTO: 90 FL (ref 80–100)
MONOCYTES # BLD AUTO: 0.5 THOU/UL (ref 0–0.9)
MONOCYTES NFR BLD AUTO: 10 % (ref 2–10)
NEUTROPHILS # BLD AUTO: 2.2 THOU/UL (ref 2–7.7)
NEUTROPHILS NFR BLD AUTO: 47 % (ref 50–70)
PLATELET # BLD AUTO: 294 THOU/UL (ref 140–440)
PMV BLD AUTO: 10.4 FL (ref 8.5–12.5)
POTASSIUM BLD-SCNC: 4.5 MMOL/L (ref 3.5–5)
RBC # BLD AUTO: 3.77 MILL/UL (ref 3.8–5.4)
SODIUM SERPL-SCNC: 138 MMOL/L (ref 136–145)
WBC: 4.8 THOU/UL (ref 4–11)

## 2020-11-18 ENCOUNTER — COMMUNICATION - HEALTHEAST (OUTPATIENT)
Dept: FAMILY MEDICINE | Facility: CLINIC | Age: 60
End: 2020-11-18

## 2020-11-18 ENCOUNTER — OFFICE VISIT - HEALTHEAST (OUTPATIENT)
Dept: INFECTIOUS DISEASES | Facility: CLINIC | Age: 60
End: 2020-11-18

## 2020-11-18 DIAGNOSIS — M00.9 PYOGENIC ARTHRITIS OF RIGHT HAND, DUE TO UNSPECIFIED ORGANISM (H): ICD-10-CM

## 2020-11-19 ENCOUNTER — RECORDS - HEALTHEAST (OUTPATIENT)
Dept: LAB | Facility: CLINIC | Age: 60
End: 2020-11-19

## 2020-11-19 LAB
ANION GAP SERPL CALCULATED.3IONS-SCNC: 10 MMOL/L (ref 5–18)
BASOPHILS # BLD AUTO: 0.1 THOU/UL (ref 0–0.2)
BASOPHILS NFR BLD AUTO: 1 % (ref 0–2)
BUN SERPL-MCNC: 12 MG/DL (ref 8–22)
C REACTIVE PROTEIN LHE: 0.2 MG/DL (ref 0–0.8)
CALCIUM SERPL-MCNC: 9.4 MG/DL (ref 8.5–10.5)
CHLORIDE BLD-SCNC: 104 MMOL/L (ref 98–107)
CO2 SERPL-SCNC: 26 MMOL/L (ref 22–31)
CREAT SERPL-MCNC: 0.74 MG/DL (ref 0.6–1.1)
EOSINOPHIL # BLD AUTO: 0.1 THOU/UL (ref 0–0.4)
EOSINOPHIL NFR BLD AUTO: 1 % (ref 0–6)
ERYTHROCYTE [DISTWIDTH] IN BLOOD BY AUTOMATED COUNT: 11.8 % (ref 11–14.5)
GFR SERPL CREATININE-BSD FRML MDRD: >60 ML/MIN/1.73M2
GLUCOSE BLD-MCNC: 124 MG/DL (ref 70–125)
HCT VFR BLD AUTO: 34.4 % (ref 35–47)
HGB BLD-MCNC: 11.9 G/DL (ref 12–16)
IMM GRANULOCYTES # BLD: 0 THOU/UL
IMM GRANULOCYTES NFR BLD: 0 %
LYMPHOCYTES # BLD AUTO: 2 THOU/UL (ref 0.8–4.4)
LYMPHOCYTES NFR BLD AUTO: 37 % (ref 20–40)
MCH RBC QN AUTO: 31.6 PG (ref 27–34)
MCHC RBC AUTO-ENTMCNC: 34.6 G/DL (ref 32–36)
MCV RBC AUTO: 91 FL (ref 80–100)
MONOCYTES # BLD AUTO: 0.5 THOU/UL (ref 0–0.9)
MONOCYTES NFR BLD AUTO: 8 % (ref 2–10)
NEUTROPHILS # BLD AUTO: 2.8 THOU/UL (ref 2–7.7)
NEUTROPHILS NFR BLD AUTO: 52 % (ref 50–70)
PLATELET # BLD AUTO: 258 THOU/UL (ref 140–440)
PMV BLD AUTO: 10.3 FL (ref 8.5–12.5)
POTASSIUM BLD-SCNC: 4.1 MMOL/L (ref 3.5–5)
RBC # BLD AUTO: 3.77 MILL/UL (ref 3.8–5.4)
SODIUM SERPL-SCNC: 140 MMOL/L (ref 136–145)
WBC: 5.5 THOU/UL (ref 4–11)

## 2020-11-26 ENCOUNTER — RECORDS - HEALTHEAST (OUTPATIENT)
Dept: LAB | Facility: CLINIC | Age: 60
End: 2020-11-26

## 2020-11-26 LAB
ANION GAP SERPL CALCULATED.3IONS-SCNC: 10 MMOL/L (ref 5–18)
BASOPHILS # BLD AUTO: 0 THOU/UL (ref 0–0.2)
BASOPHILS NFR BLD AUTO: 1 % (ref 0–2)
BUN SERPL-MCNC: 12 MG/DL (ref 8–22)
C REACTIVE PROTEIN LHE: 0.1 MG/DL (ref 0–0.8)
CALCIUM SERPL-MCNC: 9.1 MG/DL (ref 8.5–10.5)
CHLORIDE BLD-SCNC: 101 MMOL/L (ref 98–107)
CO2 SERPL-SCNC: 25 MMOL/L (ref 22–31)
CREAT SERPL-MCNC: 0.75 MG/DL (ref 0.6–1.1)
EOSINOPHIL # BLD AUTO: 0.1 THOU/UL (ref 0–0.4)
EOSINOPHIL NFR BLD AUTO: 1 % (ref 0–6)
ERYTHROCYTE [DISTWIDTH] IN BLOOD BY AUTOMATED COUNT: 11.8 % (ref 11–14.5)
GFR SERPL CREATININE-BSD FRML MDRD: >60 ML/MIN/1.73M2
GLUCOSE BLD-MCNC: 104 MG/DL (ref 70–125)
HCT VFR BLD AUTO: 36 % (ref 35–47)
HGB BLD-MCNC: 12.5 G/DL (ref 12–16)
IMM GRANULOCYTES # BLD: 0 THOU/UL
IMM GRANULOCYTES NFR BLD: 0 %
LYMPHOCYTES # BLD AUTO: 1.6 THOU/UL (ref 0.8–4.4)
LYMPHOCYTES NFR BLD AUTO: 30 % (ref 20–40)
MCH RBC QN AUTO: 31.6 PG (ref 27–34)
MCHC RBC AUTO-ENTMCNC: 34.7 G/DL (ref 32–36)
MCV RBC AUTO: 91 FL (ref 80–100)
MONOCYTES # BLD AUTO: 0.5 THOU/UL (ref 0–0.9)
MONOCYTES NFR BLD AUTO: 10 % (ref 2–10)
NEUTROPHILS # BLD AUTO: 3 THOU/UL (ref 2–7.7)
NEUTROPHILS NFR BLD AUTO: 58 % (ref 50–70)
PLATELET # BLD AUTO: 257 THOU/UL (ref 140–440)
PMV BLD AUTO: 10.3 FL (ref 8.5–12.5)
POTASSIUM BLD-SCNC: 4.1 MMOL/L (ref 3.5–5)
RBC # BLD AUTO: 3.96 MILL/UL (ref 3.8–5.4)
SODIUM SERPL-SCNC: 136 MMOL/L (ref 136–145)
WBC: 5.3 THOU/UL (ref 4–11)

## 2020-12-03 ENCOUNTER — RECORDS - HEALTHEAST (OUTPATIENT)
Dept: LAB | Facility: CLINIC | Age: 60
End: 2020-12-03

## 2020-12-03 LAB
ANION GAP SERPL CALCULATED.3IONS-SCNC: 11 MMOL/L (ref 5–18)
BASOPHILS # BLD AUTO: 0 THOU/UL (ref 0–0.2)
BASOPHILS NFR BLD AUTO: 1 % (ref 0–2)
BUN SERPL-MCNC: 14 MG/DL (ref 8–22)
C REACTIVE PROTEIN LHE: 0.1 MG/DL (ref 0–0.8)
CALCIUM SERPL-MCNC: 9.3 MG/DL (ref 8.5–10.5)
CHLORIDE BLD-SCNC: 101 MMOL/L (ref 98–107)
CO2 SERPL-SCNC: 26 MMOL/L (ref 22–31)
CREAT SERPL-MCNC: 0.7 MG/DL (ref 0.6–1.1)
EOSINOPHIL # BLD AUTO: 0.1 THOU/UL (ref 0–0.4)
EOSINOPHIL NFR BLD AUTO: 1 % (ref 0–6)
ERYTHROCYTE [DISTWIDTH] IN BLOOD BY AUTOMATED COUNT: 11.8 % (ref 11–14.5)
GFR SERPL CREATININE-BSD FRML MDRD: >60 ML/MIN/1.73M2
GLUCOSE BLD-MCNC: 94 MG/DL (ref 70–125)
HCT VFR BLD AUTO: 34.8 % (ref 35–47)
HGB BLD-MCNC: 11.9 G/DL (ref 12–16)
IMM GRANULOCYTES # BLD: 0 THOU/UL
IMM GRANULOCYTES NFR BLD: 0 %
LYMPHOCYTES # BLD AUTO: 1.7 THOU/UL (ref 0.8–4.4)
LYMPHOCYTES NFR BLD AUTO: 37 % (ref 20–40)
MCH RBC QN AUTO: 31.3 PG (ref 27–34)
MCHC RBC AUTO-ENTMCNC: 34.2 G/DL (ref 32–36)
MCV RBC AUTO: 92 FL (ref 80–100)
MONOCYTES # BLD AUTO: 0.5 THOU/UL (ref 0–0.9)
MONOCYTES NFR BLD AUTO: 11 % (ref 2–10)
NEUTROPHILS # BLD AUTO: 2.3 THOU/UL (ref 2–7.7)
NEUTROPHILS NFR BLD AUTO: 50 % (ref 50–70)
PLATELET # BLD AUTO: 262 THOU/UL (ref 140–440)
PMV BLD AUTO: 10.4 FL (ref 8.5–12.5)
POTASSIUM BLD-SCNC: 4.4 MMOL/L (ref 3.5–5)
RBC # BLD AUTO: 3.8 MILL/UL (ref 3.8–5.4)
SODIUM SERPL-SCNC: 138 MMOL/L (ref 136–145)
WBC: 4.5 THOU/UL (ref 4–11)

## 2020-12-08 ENCOUNTER — RECORDS - HEALTHEAST (OUTPATIENT)
Dept: ADMINISTRATIVE | Facility: OTHER | Age: 60
End: 2020-12-08

## 2020-12-11 ENCOUNTER — OFFICE VISIT - HEALTHEAST (OUTPATIENT)
Dept: FAMILY MEDICINE | Facility: CLINIC | Age: 60
End: 2020-12-11

## 2020-12-11 DIAGNOSIS — M00.9 PYOGENIC ARTHRITIS OF RIGHT HAND, DUE TO UNSPECIFIED ORGANISM (H): ICD-10-CM

## 2020-12-11 DIAGNOSIS — Z79.890 ON HORMONE REPLACEMENT THERAPY: ICD-10-CM

## 2020-12-11 DIAGNOSIS — Z01.818 PREOP GENERAL PHYSICAL EXAM: ICD-10-CM

## 2020-12-11 ASSESSMENT — MIFFLIN-ST. JEOR: SCORE: 1150.4

## 2020-12-17 ENCOUNTER — AMBULATORY - HEALTHEAST (OUTPATIENT)
Dept: LAB | Facility: CLINIC | Age: 60
End: 2020-12-17

## 2020-12-17 DIAGNOSIS — M00.9 PYOGENIC ARTHRITIS OF RIGHT HAND, DUE TO UNSPECIFIED ORGANISM (H): ICD-10-CM

## 2020-12-17 LAB
ANION GAP SERPL CALCULATED.3IONS-SCNC: 10 MMOL/L (ref 5–18)
BASOPHILS # BLD AUTO: 0 THOU/UL (ref 0–0.2)
BASOPHILS NFR BLD AUTO: 1 % (ref 0–2)
BUN SERPL-MCNC: 10 MG/DL (ref 8–22)
C REACTIVE PROTEIN LHE: 0.2 MG/DL (ref 0–0.8)
CALCIUM SERPL-MCNC: 9.5 MG/DL (ref 8.5–10.5)
CHLORIDE BLD-SCNC: 100 MMOL/L (ref 98–107)
CO2 SERPL-SCNC: 28 MMOL/L (ref 22–31)
CREAT SERPL-MCNC: 0.73 MG/DL (ref 0.6–1.1)
EOSINOPHIL # BLD AUTO: 0.1 THOU/UL (ref 0–0.4)
EOSINOPHIL NFR BLD AUTO: 3 % (ref 0–6)
ERYTHROCYTE [DISTWIDTH] IN BLOOD BY AUTOMATED COUNT: 10.8 % (ref 11–14.5)
GFR SERPL CREATININE-BSD FRML MDRD: >60 ML/MIN/1.73M2
GLUCOSE BLD-MCNC: 101 MG/DL (ref 70–125)
HCT VFR BLD AUTO: 37.2 % (ref 35–47)
HGB BLD-MCNC: 12.8 G/DL (ref 12–16)
LYMPHOCYTES # BLD AUTO: 1.9 THOU/UL (ref 0.8–4.4)
LYMPHOCYTES NFR BLD AUTO: 37 % (ref 20–40)
MCH RBC QN AUTO: 32.4 PG (ref 27–34)
MCHC RBC AUTO-ENTMCNC: 34.4 G/DL (ref 32–36)
MCV RBC AUTO: 94 FL (ref 80–100)
MONOCYTES # BLD AUTO: 0.5 THOU/UL (ref 0–0.9)
MONOCYTES NFR BLD AUTO: 10 % (ref 2–10)
NEUTROPHILS # BLD AUTO: 2.6 THOU/UL (ref 2–7.7)
NEUTROPHILS NFR BLD AUTO: 51 % (ref 50–70)
PLATELET # BLD AUTO: 288 THOU/UL (ref 140–440)
PMV BLD AUTO: 8.2 FL (ref 7–10)
POTASSIUM BLD-SCNC: 4.6 MMOL/L (ref 3.5–5)
RBC # BLD AUTO: 3.95 MILL/UL (ref 3.8–5.4)
SODIUM SERPL-SCNC: 138 MMOL/L (ref 136–145)
WBC: 5.1 THOU/UL (ref 4–11)

## 2020-12-18 ENCOUNTER — RECORDS - HEALTHEAST (OUTPATIENT)
Dept: LAB | Facility: CLINIC | Age: 60
End: 2020-12-18

## 2020-12-21 LAB
BACTERIA SPEC CULT: NO GROWTH
BACTERIA SPEC CULT: NORMAL
GRAM STAIN RESULT: NORMAL
GRAM STAIN RESULT: NORMAL

## 2020-12-28 ENCOUNTER — COMMUNICATION - HEALTHEAST (OUTPATIENT)
Dept: INFECTIOUS DISEASES | Facility: CLINIC | Age: 60
End: 2020-12-28

## 2020-12-28 ENCOUNTER — COMMUNICATION - HEALTHEAST (OUTPATIENT)
Dept: ADMINISTRATIVE | Facility: CLINIC | Age: 60
End: 2020-12-28

## 2020-12-28 DIAGNOSIS — M00.9 PYOGENIC ARTHRITIS OF RIGHT HAND, DUE TO UNSPECIFIED ORGANISM (H): ICD-10-CM

## 2020-12-29 ENCOUNTER — RECORDS - HEALTHEAST (OUTPATIENT)
Dept: ADMINISTRATIVE | Facility: OTHER | Age: 60
End: 2020-12-29

## 2021-01-11 LAB — BACTERIA SPEC CULT: NORMAL

## 2021-01-12 ENCOUNTER — RECORDS - HEALTHEAST (OUTPATIENT)
Dept: ADMINISTRATIVE | Facility: OTHER | Age: 61
End: 2021-01-12

## 2021-01-29 ENCOUNTER — COMMUNICATION - HEALTHEAST (OUTPATIENT)
Dept: FAMILY MEDICINE | Facility: CLINIC | Age: 61
End: 2021-01-29

## 2021-01-29 DIAGNOSIS — M00.9 PYOGENIC ARTHRITIS OF RIGHT HAND, DUE TO UNSPECIFIED ORGANISM (H): ICD-10-CM

## 2021-02-02 ENCOUNTER — RECORDS - HEALTHEAST (OUTPATIENT)
Dept: ADMINISTRATIVE | Facility: OTHER | Age: 61
End: 2021-02-02

## 2021-03-01 ENCOUNTER — COMMUNICATION - HEALTHEAST (OUTPATIENT)
Dept: ADMINISTRATIVE | Facility: CLINIC | Age: 61
End: 2021-03-01

## 2021-03-01 DIAGNOSIS — M00.9 PYOGENIC ARTHRITIS OF RIGHT HAND, DUE TO UNSPECIFIED ORGANISM (H): ICD-10-CM

## 2021-03-02 ENCOUNTER — RECORDS - HEALTHEAST (OUTPATIENT)
Dept: ADMINISTRATIVE | Facility: OTHER | Age: 61
End: 2021-03-02

## 2021-03-15 ENCOUNTER — OFFICE VISIT - HEALTHEAST (OUTPATIENT)
Dept: FAMILY MEDICINE | Facility: CLINIC | Age: 61
End: 2021-03-15

## 2021-03-15 DIAGNOSIS — H91.90 HEARING LOSS, UNSPECIFIED HEARING LOSS TYPE, UNSPECIFIED LATERALITY: ICD-10-CM

## 2021-03-15 DIAGNOSIS — M25.50 POLYARTHRALGIA: ICD-10-CM

## 2021-03-15 DIAGNOSIS — Z79.899 CONTROLLED SUBSTANCE AGREEMENT SIGNED: ICD-10-CM

## 2021-03-15 DIAGNOSIS — M00.9 PYOGENIC ARTHRITIS OF RIGHT HAND, DUE TO UNSPECIFIED ORGANISM (H): ICD-10-CM

## 2021-03-15 ASSESSMENT — MIFFLIN-ST. JEOR: SCORE: 1160.09

## 2021-03-30 ENCOUNTER — COMMUNICATION - HEALTHEAST (OUTPATIENT)
Dept: ADMINISTRATIVE | Facility: CLINIC | Age: 61
End: 2021-03-30

## 2021-03-30 DIAGNOSIS — M00.9 PYOGENIC ARTHRITIS OF RIGHT HAND, DUE TO UNSPECIFIED ORGANISM (H): ICD-10-CM

## 2021-04-10 ENCOUNTER — AMBULATORY - HEALTHEAST (OUTPATIENT)
Dept: NURSING | Facility: CLINIC | Age: 61
End: 2021-04-10

## 2021-04-13 ENCOUNTER — RECORDS - HEALTHEAST (OUTPATIENT)
Dept: ADMINISTRATIVE | Facility: OTHER | Age: 61
End: 2021-04-13

## 2021-04-28 ENCOUNTER — COMMUNICATION - HEALTHEAST (OUTPATIENT)
Dept: FAMILY MEDICINE | Facility: CLINIC | Age: 61
End: 2021-04-28

## 2021-04-28 DIAGNOSIS — M00.9 PYOGENIC ARTHRITIS OF RIGHT HAND, DUE TO UNSPECIFIED ORGANISM (H): ICD-10-CM

## 2021-05-26 ASSESSMENT — PATIENT HEALTH QUESTIONNAIRE - PHQ9: SUM OF ALL RESPONSES TO PHQ QUESTIONS 1-9: 1

## 2021-05-26 NOTE — TELEPHONE ENCOUNTER
Controlled Substance Refill Request  Medication Name:   Requested Prescriptions     Pending Prescriptions Disp Refills     HYDROcodone-acetaminophen 5-325 mg per tablet 40 tablet 0     Sig: Take 1 tablet by mouth at bedtime as needed for pain.     Date Last Fill: 2/18/19  Pharmacy: [facility]      Submit electronically to pharmacy  Controlled Substance Agreement Date Scanned:   Encounter-Level CSA Scan Date:    There are no encounter-level csa scan date.       Last office visit with prescriber/PCP: 1/4/2019 Salima Lockhart MD OR same dept: 1/4/2019 Salima Lockhart MD OR same specialty: 1/4/2019 Salima Lockhart MD  Last physical: Visit date not found Last MTM visit: Visit date not found

## 2021-05-27 ENCOUNTER — COMMUNICATION - HEALTHEAST (OUTPATIENT)
Dept: FAMILY MEDICINE | Facility: CLINIC | Age: 61
End: 2021-05-27

## 2021-05-27 DIAGNOSIS — M00.9 PYOGENIC ARTHRITIS OF RIGHT HAND, DUE TO UNSPECIFIED ORGANISM (H): ICD-10-CM

## 2021-05-27 RX ORDER — HYDROCODONE BITARTRATE AND ACETAMINOPHEN 5; 325 MG/1; MG/1
1 TABLET ORAL EVERY 6 HOURS PRN
Qty: 30 TABLET | Refills: 0 | Status: SHIPPED | OUTPATIENT
Start: 2021-05-27 | End: 2021-07-28

## 2021-05-27 ASSESSMENT — PATIENT HEALTH QUESTIONNAIRE - PHQ9: SUM OF ALL RESPONSES TO PHQ QUESTIONS 1-9: 0

## 2021-05-27 NOTE — TELEPHONE ENCOUNTER
Reason contacted:  Reminder  Information relayed:  Yes- no questions  Additional questions:  No  Further follow-up needed:  No  Okay to leave a detailed message:  No

## 2021-05-27 NOTE — TELEPHONE ENCOUNTER
----- Message from Salima Lockhart MD sent at 4/15/2019  8:48 PM CDT -----  Please inform patient that she needs follow-up for her mammogram and ultrasound in 6 months time.  I have placed future orders.  Salima Lockhart MD  4/15/2019

## 2021-05-28 ASSESSMENT — ANXIETY QUESTIONNAIRES
GAD7 TOTAL SCORE: 0
GAD7 TOTAL SCORE: 0

## 2021-05-28 NOTE — PROGRESS NOTES
Assessment:     1. Annual physical exam  Comprehensive Metabolic Panel    Lipid Cascade FASTING   2. Special screening for malignant neoplasms, colon  Ambulatory referral for Colonoscopy   3. Lentigo  Ambulatory referral to Dermatology        Healthy female exam.      Plan:       All questions answered.  Diagnosis explained in detail, including differential.  Discussed healthy lifestyle modifications.     Subjective:     Chief Complaint   Patient presents with     Annual Exam     Fasting, will talk to you about concerns during physical.        Paulina cSott is a 58 y.o. female who presents for an annual exam. The patient is not sexually active. The patient participates in regular exercise: yes. The patient reports that there is not domestic violence in her life.     Patient has congenital absence of the vagina.  She has had some dysplasia in the vagina and has had regular Pap smears with OB/GYN in [place].    She informs me that most likely she is up-to-date with a tetanus.  We do not have records available.  She declines a shot today and would like it when she has any injury in future.    She informs me that her last colonoscopy was about 9 years ago and she is due for one.  We do not have records in media from [place].  However, she is already seeing a GI.    She has a small rough spot on the anterior chest.  This bleeds whenever she is wearing a necklace.    Healthy Habits:   Regular Exercise: Yes  Sunscreen Use: Yes  Healthy Diet: Yes  Dental Visits Regularly: Yes  Seat Belt: Yes  Sexually active: Yes  Self Breast Exam Monthly:Yes  Hemoccults: No  Flex Sig: No  Colonoscopy: Yes  Lipid Profile: Yes  Glucose Screen: Yes  Prevention of Osteoporosis: No  Last Dexa: No  Guns at Home:  Yes  Guns Safety Locks:  Yes        There is no immunization history on file for this patient.  Immunization status: stated as current, but no records available.    No exam data present    Gynecologic History  No LMP  recorded.  Contraception: none  Last Pap: 2018. Results were: normal  Last mammogram: 2019. Results were: abnormal      OB History   No data available       Current Outpatient Medications   Medication Sig Dispense Refill     escitalopram oxalate (LEXAPRO) 20 MG tablet Take 1 tablet (20 mg total) by mouth daily. 30 tablet 3     estradiol (VIVELLE-DOT) 0.1 mg/24 hr APPLY 1 PATCH TWICE WEEKLY  3     gabapentin (NEURONTIN) 100 MG capsule TAKE 1 TO 2 CAPSULES BY MOUTH EVERY EVENING AT BEDTIME AS NEEDED  0     HYDROcodone-acetaminophen 5-325 mg per tablet Take 1 tablet by mouth at bedtime as needed for pain. 40 tablet 0     LINZESS 290 mcg cap capsule daily.  3     meclizine (ANTIVERT) 25 mg tablet Take 1 tablet (25 mg total) by mouth 3 (three) times a day as needed for dizziness or nausea. 30 tablet 1     No current facility-administered medications for this visit.      Past Medical History:   Diagnosis Date     Chronic bilateral low back pain with right-sided sciatica 1/4/2019     Chronic constipation 1/4/2019     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     Osteoarthritis      Pain of right hand 1/4/2019     Raynaud's disease without gangrene 1/4/2019     Past Surgical History:   Procedure Laterality Date     BACK SURGERY       HIP SURGERY       Reglan [metoclopramide hcl]; Tetracycline; and Sulfa (sulfonamide antibiotics)  Family History   Problem Relation Age of Onset     Dementia Mother         86     Cancer Mother      Breast cancer Mother 60     Cancer Maternal Grandmother      Cancer Paternal Grandmother      Social History     Socioeconomic History     Marital status:      Spouse name: Not on file     Number of children: Not on file     Years of education: Not on file     Highest education level: Not on file   Occupational History     Not on file   Social Needs     Financial resource strain: Not on file     Food insecurity:     Worry: Not on file     Inability: Not on file     Transportation needs:      Medical: Not on file     Non-medical: Not on file   Tobacco Use     Smoking status: Never Smoker     Smokeless tobacco: Never Used   Substance and Sexual Activity     Alcohol use: Yes     Frequency: 2-4 times a month     Drinks per session: 1 or 2     Drug use: No     Sexual activity: Yes     Partners: Male     Birth control/protection: None   Lifestyle     Physical activity:     Days per week: Not on file     Minutes per session: Not on file     Stress: Not on file   Relationships     Social connections:     Talks on phone: Not on file     Gets together: Not on file     Attends Yazidi service: Not on file     Active member of club or organization: Not on file     Attends meetings of clubs or organizations: Not on file     Relationship status: Not on file     Intimate partner violence:     Fear of current or ex partner: Not on file     Emotionally abused: Not on file     Physically abused: Not on file     Forced sexual activity: Not on file   Other Topics Concern     Not on file   Social History Narrative     and lives with .  No children.    Works as a .    Family lives in [place].    Moved from [place], [place].    Moved away  from [place] in 1979.        Salima Lockhart MD  1/4/2019            The 10-year ASCVD risk score (Zoilacrystal MAGANA Jr., et al., 2013) is: 2%      Values used to calculate the score:        Age: 58 years        Sex: Female        Is Non- : No        Diabetic: No        Tobacco smoker: No        Systolic Blood Pressure: 120 mmHg        Is BP treated: No        HDL Cholesterol: 82 mg/dL        Total Cholesterol: 199 mg/dL       Review of Systems  General:  Denies problem  Eyes: Denies problem  Ears/Nose/Throat: Denies problem  Cardiovascular: Denies problem  Respiratory:  Denies problem  Gastrointestinal:  Denies problem, Genitourinary: Denies problem  Musculoskeletal:  Denies problem  Skin: Denies problem  Neurologic: Denies problem  Psychiatric: Denies  "problem  Endocrine: Denies problem  Heme/Lymphatic: Denies problem   Allergic/Immunologic: Denies problem        Objective:         Vitals:    05/16/19 0809   BP: 120/70   Pulse: 67   SpO2: 96%   Weight: 129 lb (58.5 kg)   Height: 5' 5\" (1.651 m)     Body mass index is 21.47 kg/m .    Physical Exam:  General Appearance: Alert, cooperative, no distress, appears stated age  Head: Normocephalic, without obvious abnormality, atraumatic  Eyes: PERRL, conjunctiva/corneas clear, EOM's intact  Ears: Normal TM's and external ear canals, both ears  Nose: Nares normal, septum midline,mucosa normal, no drainage  Throat: Lips, mucosa, and tongue normal; teeth and gums normal  Neck: Supple, symmetrical, trachea midline, no adenopathy;  thyroid: not enlarged, symmetric, no tenderness/mass/nodules; no carotid bruit or JVD  Back: Symmetric, no curvature, ROM normal, no CVA tenderness  Lungs: Clear to auscultation bilaterally, respirations unlabored  Breasts: No breast masses, tenderness, asymmetry, or nipple discharge.  Heart: Regular rate and rhythm, S1 and S2 normal, no murmur, rub, or gallop,   Abdomen: Soft, non-tender, bowel sounds active all four quadrants,  no masses, no organomegaly  Pelvic exam is deferred  Extremities: Extremities normal, atraumatic, no cyanosis or edema  Skin: Noted freckling of the anterior chest and back.  There is a small rough 2 to 3 mm spot on anterior chest.  Lymph nodes: Cervical, supraclavicular, and axillary nodes normal  Neurologic: Normal        "

## 2021-05-28 NOTE — PROGRESS NOTES
Assessment:     1. Vertigo  meclizine (ANTIVERT) 25 mg tablet   2. Screen for colon cancer  Ambulatory referral for Colonoscopy   3. Chronic bilateral low back pain with right-sided sciatica  HYDROcodone-acetaminophen 5-325 mg per tablet   4. Abnormal mammogram          Vertigo    We will obtain records for her work-up with rheumatology.  She has had positive DASHA in the past in [place] and resumed work-up with rheumatology in [facility].  Discussed abnormal mammogram and she will pursue further work-up as previously suggested.     Plan:      Meclizine per medication orders.  The nature of vertigo syndromes were discussed along with their usual course and treatment.  Educational materials given and questions answered.     Subjective:      Chief Complaint   Patient presents with     Medication Management     Refill needed today, X2 weeks pt has been feeling dizzy/vertico         Paulina Scott is a 58 y.o. female who presents for evaluation of dizziness. The symptoms started 2 weeks ago and are ongoing. The attacks occur frequently and last 30 minutes. Positions that worsen symptoms: bending over, lying down and standing up. Previous workup/treatments: none. Associated ear symptoms: none. Associated CNS symptoms: dimming vision. Recent infections: none. Head trauma: denied. Drug ingestion: none. Noise exposure: no occupational exposure. Family history: non-contributory.    She would also like refill of her narcotics for her arthralgia and back pain.  She is being worked up with rheumatology.  I do note the lab works that have been ordered.  She also informs me that MRI has been done.  MRI was likely done at an outside facility and is not available for review.    The following portions of the patient's history were reviewed and updated as appropriate: allergies, current medications, past family history, past medical history, past social history, past surgical history and problem list.  Allergies   Allergen Reactions      Reglan [Metoclopramide Hcl]      Muscles      Tetracycline      Sulfa (Sulfonamide Antibiotics) Rash       Current Outpatient Medications on File Prior to Visit   Medication Sig Dispense Refill     escitalopram oxalate (LEXAPRO) 20 MG tablet Take 1 tablet (20 mg total) by mouth daily. 30 tablet 3     estradiol (VIVELLE-DOT) 0.1 mg/24 hr APPLY 1 PATCH TWICE WEEKLY  3     gabapentin (NEURONTIN) 100 MG capsule TAKE 1 TO 2 CAPSULES BY MOUTH EVERY EVENING AT BEDTIME AS NEEDED  0     LINZESS 290 mcg cap capsule daily.  3     [DISCONTINUED] HYDROcodone-acetaminophen 5-325 mg per tablet Take 1 tablet by mouth at bedtime as needed for pain. 40 tablet 0     No current facility-administered medications on file prior to visit.        Patient Active Problem List   Diagnosis     Congenital absence of vagina     Chronic bilateral low back pain with right-sided sciatica     Raynaud's disease without gangrene     Chronic constipation     Pain of right hand     Narcotic drug use- For chronic back pain- # 40 pills / momth     Controlled substance agreement signed     Abnormal mammogram       Past Medical History:   Diagnosis Date     Chronic bilateral low back pain with right-sided sciatica 1/4/2019     Chronic constipation 1/4/2019     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     Osteoarthritis      Pain of right hand 1/4/2019     Raynaud's disease without gangrene 1/4/2019       Past Surgical History:   Procedure Laterality Date     BACK SURGERY       HIP SURGERY         Family History   Problem Relation Age of Onset     Dementia Mother         86     Cancer Mother      Breast cancer Mother 60     Cancer Maternal Grandmother      Cancer Paternal Grandmother        Social History     Socioeconomic History     Marital status:      Spouse name: None     Number of children: None     Years of education: None     Highest education level: None   Occupational History     None   Social Needs     Financial resource strain: None      "Food insecurity:     Worry: None     Inability: None     Transportation needs:     Medical: None     Non-medical: None   Tobacco Use     Smoking status: Never Smoker     Smokeless tobacco: Never Used   Substance and Sexual Activity     Alcohol use: Yes     Frequency: 2-4 times a month     Drinks per session: 1 or 2     Drug use: No     Sexual activity: Yes     Partners: Male     Birth control/protection: None   Lifestyle     Physical activity:     Days per week: None     Minutes per session: None     Stress: None   Relationships     Social connections:     Talks on phone: None     Gets together: None     Attends Religion service: None     Active member of club or organization: None     Attends meetings of clubs or organizations: None     Relationship status: None     Intimate partner violence:     Fear of current or ex partner: None     Emotionally abused: None     Physically abused: None     Forced sexual activity: None   Other Topics Concern     None   Social History Narrative     and lives with .  No children.    Works as a .    Family lives in [place].    Moved from [place], [place].    Moved away  from [place] in 1979.        Salima Lockhart MD  1/4/2019           Review of Systems  A 12 point comprehensive review of systems was negative except as noted.      Objective:     /72 (Patient Site: Left Arm, Patient Position: Sitting, Cuff Size: Adult Regular)   Pulse 63   Ht 5' 5\" (1.651 m)   Wt 129 lb (58.5 kg)   SpO2 96%   BMI 21.47 kg/m      GENERAL APPEARANCE:  Appearing stated age, smiling, alert, cooperative, and in no acute distress.   HEENT: Pupils equal, regular, react to light and accommodation. Extraocular muscles intact, fundi benign. Ear canals and tympanic membranes are normal. Lips, mouth, and throat are unremarkable.   NECK: Neck supple without adenopathy, thyromegaly or masses.   LYMPH: No anterior cervical or supraclavicular LN enlargement   PULMONARY: Normal " respiratory effort. Chest is clear.   CARDIOVASCULAR: Heart auscultation: rhythm regular, heart sounds normal S1 and S2, bruit carotid artery absent.   SKIN: Warm and well perfused..   ABDOMEN: Abdomen soft, non-tender. BS normal. No masses or organomegaly.   EXTREMITIES: Peripheral pulses are full. Extremities with no edema.   MENTAL STATUS: Alert, oriented and thought content appropriate   NEUROLOGIC: Station and gait normal, strength and movement normal, reflexes are normal and symmetric   Allan-Hallpike maneuver is negative for nystagmus but reproduces the symptoms.

## 2021-05-28 NOTE — TELEPHONE ENCOUNTER
----- Message from Salima Lockhart MD sent at 5/18/2019  6:10 PM CDT -----  Please inform patient that the labs are normal/stable. Please mail a lab letter with these results.    Salima Lockhart MD  5/18/2019

## 2021-05-28 NOTE — TELEPHONE ENCOUNTER
DARLINE at 1:40 pm letting Pt know that all labs are NL/ stable. Will mail a copy of results to  address.  MARYELLEN Morton

## 2021-05-30 NOTE — TELEPHONE ENCOUNTER
Controlled Substance Refill Request  Medication Name:   Requested Prescriptions     Pending Prescriptions Disp Refills     HYDROcodone-acetaminophen 5-325 mg per tablet 40 tablet 0     Sig: Take 1 tablet by mouth at bedtime as needed for pain.     Date Last Fill: 5/13/2019  Pharmacy: [id number]      Submit electronically to pharmacy  Controlled Substance Agreement Date Scanned:   Encounter-Level CSA Scan Date:    There are no encounter-level csa scan date.       Last office visit with prescriber/PCP: 5/13/2019 Salima Lockhart MD OR same dept: 5/13/2019 Salima Lockhart MD OR same specialty: 5/13/2019 Salima Lockhart MD  Last physical: 5/16/2019 Last MTM visit: Visit date not found      Please expedite as patient is out of medication.

## 2021-05-30 NOTE — TELEPHONE ENCOUNTER
Medications have been refilled.    Please inform patient that the new clinic protocol, does require us to see patient every 3 months when they are taking Vicodin/any narcotic pain medication.  I had seen her recently and I do encourage her to come back in September.    Salima Lockhart MD  6/27/2019

## 2021-05-31 NOTE — TELEPHONE ENCOUNTER
Controlled Substance Refill Request  Medication Name:   Requested Prescriptions     Pending Prescriptions Disp Refills     HYDROcodone-acetaminophen 5-325 mg per tablet 40 tablet 0     Sig: Take 1 tablet by mouth at bedtime as needed for pain.     Date Last Fill: 6/27/19  Pharmacy: [id number]      Submit electronically to pharmacy  Controlled Substance Agreement Date Scanned:   Encounter-Level CSA Scan Date:    There are no encounter-level csa scan date.       Last office visit with prescriber/PCP: 5/13/2019 Salima Lockhart MD OR same dept: 5/13/2019 Salima Lockhart MD OR same specialty: 5/13/2019 Salima Lockhart MD  Last physical: 5/16/2019 Last MTM visit: Visit date not found

## 2021-06-01 ENCOUNTER — COMMUNICATION - HEALTHEAST (OUTPATIENT)
Dept: FAMILY MEDICINE | Facility: CLINIC | Age: 61
End: 2021-06-01

## 2021-06-01 DIAGNOSIS — F32.A DEPRESSION: ICD-10-CM

## 2021-06-01 NOTE — TELEPHONE ENCOUNTER
Medication: HYDROcodone-acetaminophen 5-325 mg per tablet    Last Date Filled: 8/9/19     pulled: NO   Do not have access to [place]  under Dr. Lockhart    Only PCP Prescribing?: YES    Taken as prescribed from physician notes?: YES  1- Chronic pain: This is from her DJD of the back.  She describes multiple ruptured disc and multiple back surgeries.  She uses hydrocodone 1-2 tablets every day for her pain control.  She is here to establish care and get her pain medication refill.  CSA in last year: YES    Random Utox in last year: YES    Opioids + benzodiazepines? NO

## 2021-06-01 NOTE — TELEPHONE ENCOUNTER
Refill Approved    Rx renewed per Medication Renewal Policy. Medication was last renewed on 2/18/19.    Christy Chen, [facility] Triage/Med Refill 9/5/2019     Requested Prescriptions   Pending Prescriptions Disp Refills     escitalopram oxalate (LEXAPRO) 20 MG tablet 30 tablet 3     Sig: Take 1 tablet (20 mg total) by mouth daily.       SSRI Refill Protocol  Passed - 9/5/2019 12:54 PM        Passed - PCP or prescribing provider visit in last year     Last office visit with prescriber/PCP: 5/13/2019 Salima Lockhart MD OR same dept: 5/13/2019 Salima Lockhart MD OR same specialty: 5/13/2019 Salima Lockhart MD  Last physical: 5/16/2019 Last MTM visit: Visit date not found   Next visit within 3 mo: Visit date not found  Next physical within 3 mo: Visit date not found  Prescriber OR PCP: Salima Lockhart MD  Last diagnosis associated with med order: There are no diagnoses linked to this encounter.  If protocol passes may refill for 12 months if within 3 months of last provider visit (or a total of 15 months).

## 2021-06-01 NOTE — TELEPHONE ENCOUNTER
Controlled Substance Refill Request  Medication Name:   Requested Prescriptions     Pending Prescriptions Disp Refills     HYDROcodone-acetaminophen 5-325 mg per tablet 40 tablet 0     Sig: Take 1 tablet by mouth at bedtime as needed for pain.     Date Last Fill: 8/9/19  Pharmacy: [id number]      Submit electronically to pharmacy  Controlled Substance Agreement Date Scanned:   Encounter-Level CSA Scan Date:    There are no encounter-level csa scan date.       Last office visit with prescriber/PCP: 5/13/2019 Salima Lockhart MD OR same dept: 5/13/2019 Salima Lockhart MD OR same specialty: 5/13/2019 Salima Lockhart MD  Last physical: 5/16/2019 Last MTM visit: Visit date not found

## 2021-06-02 VITALS — HEIGHT: 65 IN | BODY MASS INDEX: 21.09 KG/M2 | WEIGHT: 126.6 LBS

## 2021-06-02 RX ORDER — ESCITALOPRAM OXALATE 20 MG/1
20 TABLET ORAL DAILY
Qty: 90 TABLET | Refills: 3 | Status: SHIPPED | OUTPATIENT
Start: 2021-06-02 | End: 2022-05-16

## 2021-06-02 NOTE — TELEPHONE ENCOUNTER
Okay to use a reserved spot for med check only visit.  I have refilled her narcotic for now.    Salima Lockhart MD  11/3/2019

## 2021-06-02 NOTE — TELEPHONE ENCOUNTER
Please inform patient that I have refilled her medication.  The new guidelines suggest patient follow-up in clinic 3 months if on a opioid.  Recommend that she make a follow-up appointment.    Salima Lockhart MD  10/25/2019

## 2021-06-02 NOTE — TELEPHONE ENCOUNTER
New Appointment Needed  What is the reason for the visit:    Medication Check   Provider Preference: PCP only- patient stated that she has a controlled substance agreement that she signed and she is trying to get one of her narcotics refilled.   How soon do you need to be seen?: The week of November 4th on Thursday or Friday or the following week on 11.14.19 or 11.15.19.    Waitlist offered?: No  Okay to leave a detailed message:  Yes

## 2021-06-02 NOTE — TELEPHONE ENCOUNTER
Controlled Substance Refill Request  Medication Name:   Requested Prescriptions     Pending Prescriptions Disp Refills     HYDROcodone-acetaminophen 5-325 mg per tablet 40 tablet 0     Sig: Take 1 tablet by mouth at bedtime as needed for pain.     Date Last Fill: 9/18/19Pharmacy: [facility]      Submit electronically to pharmacy  Controlled Substance Agreement Date Scanned:   Encounter-Level CSA Scan Date:    There are no encounter-level csa scan date.       Last office visit with prescriber/PCP: 5/13/2019 Salima Lockhart MD OR same dept: 5/13/2019 Salima Lockhart MD OR same specialty: 5/13/2019 Salima Lockhart MD  Last physical: 5/16/2019 Last MTM visit: Visit date not found

## 2021-06-03 VITALS
BODY MASS INDEX: 21.8 KG/M2 | DIASTOLIC BLOOD PRESSURE: 66 MMHG | WEIGHT: 131 LBS | OXYGEN SATURATION: 100 % | HEART RATE: 58 BPM | RESPIRATION RATE: 16 BRPM | SYSTOLIC BLOOD PRESSURE: 133 MMHG

## 2021-06-03 VITALS — HEIGHT: 65 IN | BODY MASS INDEX: 21.49 KG/M2 | WEIGHT: 129 LBS

## 2021-06-03 VITALS — WEIGHT: 129 LBS | HEIGHT: 65 IN | BODY MASS INDEX: 21.49 KG/M2

## 2021-06-03 NOTE — PROGRESS NOTES
Assessment:     1. Controlled substance agreement signed  Drug Abuse 1+, Urine   2. Chronic bilateral low back pain with right-sided sciatica  Drug Abuse 1+, Urine   3. Rash and nonspecific skin eruption       .  Rash appears to be a chronic callus.  Use good emollient.  May use hydrocortisone for a couple of weeks.  Follow-up if not improving or worsening.     Plan:      The diagnosis was discussed with the patient and evaluation and treatment plans outlined.  See orders for lab and imaging studies.   Patient Instructions   I seen you today for a med check visit.  We will do the urine exam.  Medication will be refilled.    A Tdap vaccine is being dispensed today.  This is for tetanus, diphtheria and pertussis.    For the rash on your foot, use cortisone for 2 weeks.  Use occlusion method as discussed for dryness.  Follow-up if persisting or not improving.             Subjective:      Paulina Scott is a  female who presents for evaluation of   Chief Complaint   Patient presents with     Medication Management     Pt here today for medication check      Patient is here for her narcotic refill.  She takes them  judicially.  She has followed with rheumatology.  She has a small rash on her dorsum of the right foot that she has been treating for a fungal infection.  This is rough.  It does not itch or hurt.  She has been using OTC clotrimazole without any benefit.  It has been present for a few months.    The following portions of the patient's history were reviewed and updated as appropriate: allergies, current medications, past family history, past medical history, past social history, past surgical history and problem list  Allergies   Allergen Reactions     Reglan [Metoclopramide Hcl]      Muscles      Tetracycline      Sulfa (Sulfonamide Antibiotics) Rash       Current Outpatient Medications on File Prior to Visit   Medication Sig Dispense Refill     escitalopram oxalate (LEXAPRO) 20 MG tablet Take 1 tablet (20  mg total) by mouth daily. 90 tablet 2     estradiol (VIVELLE-DOT) 0.1 mg/24 hr APPLY 1 PATCH TWICE WEEKLY  3     gabapentin (NEURONTIN) 100 MG capsule TAKE 1 TO 2 CAPSULES BY MOUTH EVERY EVENING AT BEDTIME AS NEEDED  0     HYDROcodone-acetaminophen 5-325 mg per tablet Take 1 tablet by mouth at bedtime as needed for pain. 40 tablet 0     LINZESS 290 mcg cap capsule daily.  3     [DISCONTINUED] meclizine (ANTIVERT) 25 mg tablet Take 1 tablet (25 mg total) by mouth 3 (three) times a day as needed for dizziness or nausea. 30 tablet 1     No current facility-administered medications on file prior to visit.        Patient Active Problem List   Diagnosis     Congenital absence of vagina     Chronic bilateral low back pain with right-sided sciatica     Raynaud's disease without gangrene     Chronic constipation     Pain of right hand     Narcotic drug use- For chronic back pain- # 40 pills / 2 month CSA/ [facility]- Jan 2019     Controlled substance agreement signed     Abnormal mammogram       Past Medical History:   Diagnosis Date     Chronic bilateral low back pain with right-sided sciatica 1/4/2019     Chronic constipation 1/4/2019     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     Osteoarthritis      Pain of right hand 1/4/2019     Raynaud's disease without gangrene 1/4/2019       Past Surgical History:   Procedure Laterality Date     BACK SURGERY       HIP SURGERY         Family History   Problem Relation Age of Onset     Dementia Mother         86     Cancer Mother      Breast cancer Mother 60     Cancer Maternal Grandmother      Cancer Paternal Grandmother        Social History     Socioeconomic History     Marital status:      Spouse name: None     Number of children: None     Years of education: None     Highest education level: None   Occupational History     None   Social Needs     Financial resource strain: None     Food insecurity:     Worry: None     Inability: None     Transportation needs:     Medical: None      Non-medical: None   Tobacco Use     Smoking status: Never Smoker     Smokeless tobacco: Never Used   Substance and Sexual Activity     Alcohol use: Yes     Frequency: 2-4 times a month     Drinks per session: 1 or 2     Drug use: No     Sexual activity: Yes     Partners: Male     Birth control/protection: None   Lifestyle     Physical activity:     Days per week: None     Minutes per session: None     Stress: None   Relationships     Social connections:     Talks on phone: None     Gets together: None     Attends Taoist service: None     Active member of club or organization: None     Attends meetings of clubs or organizations: None     Relationship status: None     Intimate partner violence:     Fear of current or ex partner: None     Emotionally abused: None     Physically abused: None     Forced sexual activity: None   Other Topics Concern     None   Social History Narrative     and lives with .  No children.    Works as a .    Family lives in [place].    Moved from [place], [place].    Moved away  from [place] in 1979.        Salima Lockhart MD  1/4/2019            The 10-year ASCVD risk score (Zoila MAGANA Jr., et al., 2013) is: 2%      Values used to calculate the score:        Age: 58 years        Sex: Female        Is Non- : No        Diabetic: No        Tobacco smoker: No        Systolic Blood Pressure: 120 mmHg        Is BP treated: No        HDL Cholesterol: 82 mg/dL        Total Cholesterol: 199 mg/dL       Review of Systems  Constitutional: negative  Respiratory: negative  Cardiovascular: negative  Gastrointestinal: negative       Objective:   /66 (Patient Site: Left Arm, Patient Position: Sitting, Cuff Size: Adult Regular)   Pulse (!) 58   Resp 16   Wt 131 lb (59.4 kg)   SpO2 100%   BMI 21.80 kg/m      General Appearance: healthy, alert, oriented and no distress  Skin exam: Small rough area about 1 cm circular on the dorsum of the right foot.  This is  on a bony prominence.  Neurological exam: gait normal, alert and oriented X 3

## 2021-06-03 NOTE — TELEPHONE ENCOUNTER
Controlled Substance Refill Request  Medication Name:   Requested Prescriptions     Pending Prescriptions Disp Refills     HYDROcodone-acetaminophen 5-325 mg per tablet 40 tablet 0     Sig: Take 1 tablet by mouth at bedtime as needed for pain.     Date Last Fill: 10/25/2019  Ph[facility]: [id number]      Submit electronically to pharmacy  Controlled Substance Agreement Date Scanned:   Encounter-Level CSA Scan Date:    There are no encounter-level csa scan date.       Last office visit with prescriber/PCP: 11/7/2019 Salima Lockhart MD OR same dept: 11/7/2019 Salima Lockhart MD OR same specialty: 11/7/2019 Salima Lockhart MD  Last physical: 5/16/2019 Last MTM visit: Visit date not found

## 2021-06-03 NOTE — PATIENT INSTRUCTIONS - HE
I seen you today for a med check visit.  We will do the urine exam.  Medication will be refilled.    A Tdap vaccine is being dispensed today.  This is for tetanus, diphtheria and pertussis.    For the rash on your foot, use cortisone for 2 weeks.  Use occlusion method as discussed for dryness.  Follow-up if persisting or not improving.

## 2021-06-05 VITALS
RESPIRATION RATE: 16 BRPM | SYSTOLIC BLOOD PRESSURE: 129 MMHG | WEIGHT: 131 LBS | HEART RATE: 77 BPM | OXYGEN SATURATION: 100 % | DIASTOLIC BLOOD PRESSURE: 83 MMHG | HEIGHT: 65 IN | BODY MASS INDEX: 21.83 KG/M2

## 2021-06-05 VITALS — BODY MASS INDEX: 22.59 KG/M2 | WEIGHT: 135.6 LBS | HEIGHT: 65 IN

## 2021-06-05 VITALS
HEIGHT: 65 IN | DIASTOLIC BLOOD PRESSURE: 78 MMHG | HEART RATE: 67 BPM | WEIGHT: 131 LBS | OXYGEN SATURATION: 98 % | BODY MASS INDEX: 21.83 KG/M2 | SYSTOLIC BLOOD PRESSURE: 127 MMHG

## 2021-06-05 VITALS
OXYGEN SATURATION: 98 % | BODY MASS INDEX: 21.49 KG/M2 | DIASTOLIC BLOOD PRESSURE: 76 MMHG | RESPIRATION RATE: 16 BRPM | SYSTOLIC BLOOD PRESSURE: 130 MMHG | WEIGHT: 129 LBS | HEART RATE: 78 BPM | HEIGHT: 65 IN

## 2021-06-05 NOTE — TELEPHONE ENCOUNTER
Controlled Substance Refill Request  Medication Name:   Requested Prescriptions     Pending Prescriptions Disp Refills     HYDROcodone-acetaminophen 5-325 mg per tablet 40 tablet 0     Sig: Take 1 tablet by mouth at bedtime as needed for pain.   Date Last Fill: 12/03/19  [id number]  Submit electronically to pharmacy  Controlled Substance Agreement on file:   Encounter-Level CSA Scan Date:    There are no encounter-level csa scan date.        Last office visit:  11/07/19

## 2021-06-06 NOTE — TELEPHONE ENCOUNTER
Patient informed, pt states this was a pharmacy mix up and that she knows her GI provider needs to prescribe this.

## 2021-06-06 NOTE — TELEPHONE ENCOUNTER
Controlled Substance Refill Request  Medication Name:   Requested Prescriptions     Pending Prescriptions Disp Refills     HYDROcodone-acetaminophen 5-325 mg per tablet 40 tablet 0     Sig: Take 1 tablet by mouth at bedtime as needed for pain.     Date Last Fill: 02/11/20  Is patient out of medication?:  Yes  Patient notified refills processed within 3 business days:  Yes  [facility]  Submit electronically to pharmacy  Controlled Substance Agreement on file:   Encounter-Level CSA Scan Date:    There are no encounter-level csa scan date.        Last office visit:    11/07/19

## 2021-06-06 NOTE — TELEPHONE ENCOUNTER
Controlled Substance Refill Request  Medication Name:   Requested Prescriptions     Pending Prescriptions Disp Refills     HYDROcodone-acetaminophen 5-325 mg per tablet 40 tablet 0     Sig: Take 1 tablet by mouth at bedtime as needed for pain.     Date Last Fill: 1/9/20  Is patient out of medication?: No, One or two days left  Patient notified refills processed within 3 business days:  Yes  [facility]  Submit electronically to pharmacy  Controlled Substance Agreement on file:   Encounter-Level CSA Scan Date:    There are no encounter-level csa scan date.        Last office visit:  11/7/20.

## 2021-06-06 NOTE — TELEPHONE ENCOUNTER
Please inform patient that I have not prescribed this medication for her.  Is it coming from her gastroenterologist?  In that case, this should be prescribed by the GI.  I had referred her to GI but I do not find any records that have come back to us.    Salima Lockhart MD  2/27/2020

## 2021-06-07 NOTE — TELEPHONE ENCOUNTER
Controlled Substance Refill Request  Medication Name:   Requested Prescriptions     Pending Prescriptions Disp Refills     HYDROcodone-acetaminophen 5-325 mg per tablet 40 tablet 0     Sig: Take 1 tablet by mouth at bedtime as needed for pain.     Date Last Fill: 03/16/2020  Is patient out of medication?: No  Patient notified refills processed within 3 business days:  Yes  [facility]  Submit electronically to pharmacy  Controlled Substance Agreement on file:   Encounter-Level CSA Scan Date:    There are no encounter-level csa scan date.        Last office visit:  11/07/2019

## 2021-06-08 NOTE — TELEPHONE ENCOUNTER
Controlled Substance Refill Request  Medication Name:   Requested Prescriptions     Pending Prescriptions Disp Refills     HYDROcodone-acetaminophen 5-325 mg per tablet 40 tablet 0     Sig: Take 1 tablet by mouth at bedtime as needed for pain.     Date Last Fill: 04/20/20  [facility]  Submit electronically to pharmacy  Controlled Substance Agreement on file:   Encounter-Level CSA Scan Date:    There are no encounter-level csa scan date.        Last office visit:  11/07/19

## 2021-06-09 NOTE — TELEPHONE ENCOUNTER
Controlled Substance Refill Request  Medication Name:   Requested Prescriptions     Pending Prescriptions Disp Refills     HYDROcodone-acetaminophen 5-325 mg per tablet 40 tablet 0     Sig: Take 1 tablet by mouth at bedtime as needed for pain.     Date Last Fill: 5/26/20  Is patient out of medication?: No, 3 days left   Patient notified refills processed within 3 business days:  Yes  [facility]  Submit electronically to pharmacy  Controlled Substance Agreement on file:   Encounter-Level CSA Scan Date:    There are no encounter-level csa scan date.        Last office visit:  11/7/19

## 2021-06-09 NOTE — TELEPHONE ENCOUNTER
I called the phone number [phone] and it has been disconnected.  I gives another number that is accepting call for the 633 number.  [phone].  I called, it does not identify who it is.  I left a message asking for a return call.      They called back a few mins later and gave information for   [facility] [place]    [phone]

## 2021-06-09 NOTE — TELEPHONE ENCOUNTER
Spoke to [facility] and they are requesting GILMA.  GILMA can be faxed to [phone]. GILMA mailed to patient sign for [facility] (Dr. Walls) and [facility]

## 2021-06-09 NOTE — TELEPHONE ENCOUNTER
Pt notified at 1250 pm, relayed Dr Lockhart's message. Pt is in agreement of scheduling a med check and was scheduled for a telephone visit for 7/2/2020 at 400 pm.  TOLU MortonA

## 2021-06-09 NOTE — TELEPHONE ENCOUNTER
Informed patient I am mailing her GILMA forms for [facility] and [facility] for her to sign and mail back to clinic. Pt voiced understanding. GILMA's mailed to patient to sign.

## 2021-06-09 NOTE — TELEPHONE ENCOUNTER
Medication refill for the patient.  Please schedule a med check visit  Salima Lockhart MD  6/26/2020

## 2021-06-09 NOTE — PROGRESS NOTES
"Paulina Scott is a 60 y.o. female who is being evaluated via a billable telephone visit.      The patient has been notified of following:     \"This telephone visit will be conducted via a call between you and your physician/provider. We have found that certain health care needs can be provided without the need for a physical exam.  This service lets us provide the care you need with a short phone conversation.  If a prescription is necessary we can send it directly to your pharmacy.  If lab work is needed we can place an order for that and you can then stop by our lab to have the test done at a later time.    Telephone visits are billed at different rates depending on your insurance coverage. During this emergency period, for some insurers they may be billed the same as an in-person visit.  Please reach out to your insurance provider with any questions.    If during the course of the call the physician/provider feels a telephone visit is not appropriate, you will not be charged for this service.\"    Patient has given verbal consent to a Telephone visit? Yes    What phone number would you like to be contacted at? [phone]    Patient would like to receive their AVS by AVS Preference: Mail a copy.    Additional provider notes:  Chief Complaint   Patient presents with     Medication Management     Medication check and question about Escitalopram       Subjective:    Paulina Scott is a 60 y.o. female who presents for medication check.  She has been on Lexapro 20 mg.  In the past she had tried to wean to a lower dose but needed to get back on the higher dose.  This has been for anxiety.  Currently, she feels stable and does not want to change medication dose.  She used to be on a higher dose of narcotic medication, this is for her polyarthralgia and back pain which was being worked up in [place].  She informs me that she uses 1 pill every night.  She gets 34 pills from the pharmacy.    She denies any new " concerns except that her rheumatologist has now retired.  She was following with Dr. Randal Walls for polyarthralgia.  He had done and had an MRI and referred her to [facility].  She did get injections of steroid on her joint that have not helped much.  She informs me that [facility] followed with [facility].  We do not have those consultations in our records.    Problem List, Past Medical History, Social History, Family History, Medications, and Allergies reviewed in [facility].      Review of Systems -  Review of Systems - General ROS: negative  Respiratory ROS: negative  Cardiovascular ROS: negative  Neurological ROS: negative        Objective:     There were no vitals taken for this visit.    PSYCH: Mentation appears normal, affect normal/bright, judgement and insight intact, normal speech and appearance well-groomed      Assessment:  1. Anxiety     2. Raynaud's disease without gangrene  Ambulatory referral to Rheumatology   3. Chronic bilateral low back pain with right-sided sciatica     4. Polyarthralgia  Ambulatory referral to Rheumatology     Patient with anxiety, chronic back pain and polyarthralgia.  I will make a referral to rheumatology as the previous rheumatologist has retired.  I reviewed the records from [facility].  At this time continue on 20 mg Lexapro.  I did review FARZANA and PHQ scales and these are fairly normal.  Discussion with the patient regarding weaning protocol and not to stop abruptly.  At this time patient prefers to continue medication.  Follow-up in 3 months advised.    Plan:    We will get records from previous specialist.  Follow-up in 3 months for CSA agreement renewal.    See orders in EpicCare.        Phone call duration:  12 minutes    Salima Lockhart MD    PHQ 7/2/2020   PHQ-9 Total Score 0   Q9: Thoughts of better off dead/self-harm past 2 weeks Not at all     Total FARZANA 7 Score       7/2/2020             FARZANA 7 Total Score:  0

## 2021-06-10 NOTE — TELEPHONE ENCOUNTER
Controlled Substance Refill Request  Medication Name:   Requested Prescriptions     Pending Prescriptions Disp Refills     HYDROcodone-acetaminophen 5-325 mg per tablet 40 tablet 0     Sig: Take 1 tablet by mouth at bedtime as needed for pain.     Date Last Fill: 6/26/2020  [facility]  Submit electronically to pharmacy  Controlled Substance Agreement on file:   Encounter-Level CSA Scan Date:    There are no encounter-level csa scan date.        Last office visit:  7/2/2020

## 2021-06-10 NOTE — TELEPHONE ENCOUNTER
Refill Approved    Rx renewed per Medication Renewal Policy. Medication was last renewed on 6/7/2020.    [facility], [facility] Connection Triage/Med Refill 7/30/2020     Requested Prescriptions   Pending Prescriptions Disp Refills     escitalopram oxalate (LEXAPRO) 20 MG tablet 90 tablet 2     Sig: Take 1 tablet (20 mg total) by mouth daily.       SSRI Refill Protocol  Passed - 7/29/2020 10:18 AM        Passed - PCP or prescribing provider visit in last year     Last office visit with prescriber/PCP: 11/7/2019 Salima Lockhart MD OR same dept: 11/7/2019 Salima Lockhart MD OR same specialty: 11/7/2019 Salima Lockhart MD  Last physical: 5/16/2019 Last MTM visit: Visit date not found   Next visit within 3 mo: Visit date not found  Next physical within 3 mo: Visit date not found  Prescriber OR PCP: Salima Lockhart MD  Last diagnosis associated with med order: 1. Depression  - escitalopram oxalate (LEXAPRO) 20 MG tablet; Take 1 tablet (20 mg total) by mouth daily.  Dispense: 90 tablet; Refill: 2    If protocol passes may refill for 12 months if within 3 months of last provider visit (or a total of 15 months).

## 2021-06-10 NOTE — TELEPHONE ENCOUNTER
Medication: Hydrocodone     Last Date Filled  Per Epic 6/26/2020      pulled: NO  No access to  for Dr Lockhart    Only PCP Prescribing?: unknown    Taken as prescribed from physician notes?: YES  7/2/2020 VV  She used to be on a higher dose of narcotic medication, this is for her polyarthralgia and back pain which was being worked up in [place].  She informs me that she uses 1 pill every night.  She gets 34 pills from the pharmacy.    CSA in last year: NO    Random Utox in last year: YES    Opioids + benzodiazepines? NO

## 2021-06-11 NOTE — PATIENT INSTRUCTIONS - HE

## 2021-06-11 NOTE — TELEPHONE ENCOUNTER
Controlled Substance Refill Request  Medication Name:   Requested Prescriptions     Pending Prescriptions Disp Refills     HYDROcodone-acetaminophen 5-325 mg per tablet 40 tablet 0     Sig: Take 1 tablet by mouth at bedtime as needed for pain.     Date Last Fill: 07/31/2020  [facility]  Submit electronically to pharmacy  Controlled Substance Agreement on file:   Encounter-Level CSA Scan Date:    There are no encounter-level csa scan date.        Last office visit:  08/27/2020

## 2021-06-11 NOTE — PROGRESS NOTES
[facility]  [address] [facility] [address]  Dept: [phone]  Dept Fax: [phone]  Primary Provider: Roxie Lockhart MD  Pre-op Performing Provider: ROXIE LOCKHART    PREOPERATIVE EVALUATION:  Today's date: 9/11/2020    Paulina Scott is a 60 y.o. female who presents for a preoperative evaluation.    Surgical Information:  Surgery Details 9/11/2020   Surgery/Procedure: RT index finger metacarpal/ phalangeal joint arthroplasty   Surgery Location: [facility]   Surgeon: Dr Dumont   Surgery Date: 9/18/2020   Time of Surgery: 800 am   Where patient plans to recover: at home with family     Fax number for surgical facility: Fax # [phone]  Type of Anesthesia Anticipated: Local with MAC    Subjective     HPI related to upcoming procedure: Finger joint arthroplasty    Preop Questions 9/11/2020   Have you ever had a heart attack or stroke? No   Have you ever had surgery on your heart or blood vessels, such as a stent placement, a coronary artery bypass, or surgery on an artery in your head, neck, heart, or legs? No   Do you have chest pain with activity? No   Do you have a history of  heart failure? No   Do you currently have a cold, bronchitis or symptoms of other infection? No   Do you have a cough, shortness of breath, or wheezing? No   Do you or anyone in your family have previous history of blood clots? No   Do you or does anyone in your family have a serious bleeding problem such as prolonged bleeding following surgeries or cuts? No   Have you ever had problems with anemia or been told to take iron pills? No   Have you had any abnormal blood loss such as black, tarry or bloody stools, or abnormal vaginal bleeding? No   Have you ever had a blood transfusion? No   Are you willing to have a blood transfusion if it is medically needed before, during, or after your surgery? Yes   Have you or any of your relatives ever had problems with anesthesia? YES - PONV   Do you have  sleep apnea, excessive snoring or daytime drowsiness? No   Do you have any artifical heart valves or other implanted medical devices like a pacemaker, defibrillator, or continuous glucose monitor? No   Do you have artificial joints? YES - GREATER TOE LEFT    Are you allergic to latex? No   Is there any chance that you may be pregnant? No         Patient does not have a Health Care Directive or Living Will: Discussed advance care planning with patient; information given to patient to review.    [place] ([place] reviewed: No concerns    See problem list for active medical problems.  Problems all longstanding and stable, except as noted/documented.  See ROS for pertinent symptoms related to these conditions.      Review of Systems  CONSTITUTIONAL: NEGATIVE for fever, chills, change in weight  ENT/MOUTH: Negative for ear, mouth, and throat problems  RESP: NEGATIVE for significant cough or SOB  CV: NEGATIVE for chest pain, palpitations or peripheral edema    Patient Active Problem List    Diagnosis Date Noted     PONV (postoperative nausea and vomiting) 09/11/2020     On hormone replacement therapy 09/11/2020     Polyarthralgia 07/02/2020     Anxiety 07/02/2020     Abnormal mammogram 05/13/2019     Narcotic drug use- For chronic back pain- # 40 pills / 1-2 month CSA/ DAM- 11/2019 02/18/2019     Controlled substance agreement signed 02/18/2019     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     Chronic bilateral low back pain with right-sided sciatica 01/04/2019     Raynaud's disease without gangrene 01/04/2019     Chronic constipation 01/04/2019     Pain of right hand 01/04/2019     Past Medical History:   Diagnosis Date     Chronic bilateral low back pain with right-sided sciatica 1/4/2019     Chronic constipation 1/4/2019     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     Osteoarthritis      Pain of right hand 1/4/2019     Raynaud's disease without gangrene 1/4/2019     Past Surgical History:   Procedure Laterality  "Date     BACK SURGERY       HIP SURGERY       Current Outpatient Medications   Medication Sig Dispense Refill     escitalopram oxalate (LEXAPRO) 20 MG tablet Take 1 tablet (20 mg total) by mouth daily. 90 tablet 2     estradioL (VIVELLE-DOT) 0.05 mg/24 hr        HYDROcodone-acetaminophen 5-325 mg per tablet Take 1 tablet by mouth at bedtime as needed for pain. 40 tablet 0     LINZESS 290 mcg cap capsule daily.  3     FLUCELVAX QUAD 1706-4707, PF, 60 mcg (15 mcg x 4)/0.5 mL Syrg TO BE ADMINISTERED BY PHARMACIST FOR IMMUNIZATION  0     gabapentin (NEURONTIN) 100 MG capsule Pt takes 2-4 tablets as needed at bedtime  0     YUVAFEM 10 mcg vaginal tablet INSERT 1 TABLET VAGINALLY TWICE WEEKLY       No current facility-administered medications for this visit.        Allergies   Allergen Reactions     Reglan [Metoclopramide Hcl]      Muscles      Tetracycline      Sulfa (Sulfonamide Antibiotics) Rash       Social History     Tobacco Use     Smoking status: Never Smoker     Smokeless tobacco: Never Used   Substance Use Topics     Alcohol use: Yes     Frequency: 2-4 times a month     Drinks per session: 1 or 2      Family History   Problem Relation Age of Onset     Dementia Mother         86     Cancer Mother      Breast cancer Mother 60     Cancer Maternal Grandmother      Cancer Paternal Grandmother      Social History     Substance and Sexual Activity   Drug Use No           Objective   /83 (Patient Site: Left Arm, Patient Position: Sitting, Cuff Size: Adult Regular)   Pulse 77   Resp 16   Ht 5' 4.88\" (1.648 m)   Wt 131 lb (59.4 kg)   SpO2 100%   BMI 21.88 kg/m    Physical Exam  GENERAL APPEARANCE: healthy, alert and no distress  RESP: lungs clear to auscultation - no rales, rhonchi or wheezes  CV: regular rate and rhythm, normal S1 S2, no S3 or S4 and no murmur, click or rub  ABDOMEN: soft, non-tender  MS: normal range of motion no edema  NEURO: Normal strength and tone, sensory exam grossly normal, mentation " intact and speech normal    Recent Labs   Lab Test 05/16/19  0857 01/24/19  1720   HGB  --  13.2   PLT  --  274     --    K 4.1  --    CREATININE 0.69  --         PRE-OP Diagnostics:   No labs were ordered during this visit.  No EKG required for low risk surgery (cataract, skin procedure, breast biopsy, etc).         Assessment & Plan       The proposed surgical procedure is considered LOW risk.    REVISED CARDIAC RISK INDEX   The patient has the following serious cardiovascular risks for perioperative complications:  No serious cardiac risks = 0 points    INTERPRETATION: 0 points: Class I (very low risk - 0.4% complication rate)      ICD-10-CM    1. Preop general physical exam  Z01.818    2. Controlled substance agreement signed  Z79.899 Drug Abuse 1+, Urine   3. On hormone replacement therapy  Z79.890    4. Chronic constipation  K59.09    5. PONV (postoperative nausea and vomiting)  R11.2     Z98.890        The patient has the following additional risks and recommendations for perioperative complications:    Patient is on Linzess for chronic constipation.  She does not take it regularly.  She can hold it a couple of days before surgery to avoid dehydration or any other complication.  Usual indication to hold Linzess since for abdominal surgeries.  Patient is on HRT which theoretically does increase the risk of DVT/PE.  This is discussed with the patient.  She will use her gabapentin as before.    Patient takes 1 a day of hydrocodone that she can continue as before.  She was due for a controlled substance agreement and a drug urine screen which was obtained today.     - No identified additional risk factors other than previously addressed     MEDICATION INSTRUCTIONS:  Patient is to take all scheduled medications on the day of surgery EXCEPT for modifications listed below:    RECOMMENDATION:  APPROVAL GIVEN to proceed with proposed procedure, without further diagnostic evaluation.    Return in about 6 months  (around 3/11/2021).    Signed Electronically by: Salima Lockhart MD    Copy of this evaluation report is provided to requesting physician.    Preop [facility] Preop Guidelines    Revised Cardiac Risk Index

## 2021-06-11 NOTE — TELEPHONE ENCOUNTER
Medication:   Requested Prescriptions     Pending Prescriptions Disp Refills     HYDROcodone-acetaminophen 5-325 mg per tablet 40 tablet 0     Sig: Take 1 tablet by mouth at bedtime as needed for pain.       Last Date Filled   Disp  Refills  Start  End  ELLIS     HYDROcodone-acetaminophen 5-325 mg per tablet  40 tablet  0  7/31/2020   No    Sig - Route: Take 1 tablet by mouth at bedtime as needed for pain. - Oral    Sent to pharmacy as: HYDROcodone 5 mg-acetaminophen 325 mg tablet    Earliest Fill Date: 7/31/2020           pulled: No, no access      Only PCP Prescribing?: Unknown, no access    Taken as prescribed from physician notes?: YES  07/02/20  Paulina Scott is a 60 y.o. female who presents for medication check.  She has been on Lexapro 20 mg.  In the past she had tried to wean to a lower dose but needed to get back on the higher dose.  This has been for anxiety.  Currently, she feels stable and does not want to change medication dose.  She used to be on a higher dose of narcotic medication, this is for her polyarthralgia and back pain which was being worked up in [place].  She informs me that she uses 1 pill every night.  She gets 34 pills from the pharmacy.     She denies any new concerns except that her rheumatologist has now retired.  She was following with Dr. Randal Walls for polyarthralgia.  He had done and had an MRI and referred her to [facility].  She did get injections of steroid on her joint that have not helped much.  She informs me that [facility]  We do not have those consultations in our records.     Problem List, Past Medical History, Social History, Family History, Medications, and Allergies reviewed in [facility].        Review of Systems -  Review of Systems - General ROS: negative  Respiratory ROS: negative  Cardiovascular ROS: negative  Neurological ROS: negative           Objective:      There were no vitals taken for this visit.     PSYCH:  Mentation appears normal, affect normal/bright, judgement and insight intact, normal speech and appearance well-groomed        Assessment:  1. Anxiety      2. Raynaud's disease without gangrene  Ambulatory referral to Rheumatology   3. Chronic bilateral low back pain with right-sided sciatica      4. Polyarthralgia  Ambulatory referral to Rheumatology      Patient with anxiety, chronic back pain and polyarthralgia.  I will make a referral to rheumatology as the previous rheumatologist has retired.  I reviewed the records from [facility].  At this time continue on 20 mg Lexapro.  I did review FARZANA and PHQ scales and these are fairly normal.  Discussion with the patient regarding weaning protocol and not to stop abruptly.  At this time patient prefers to continue medication.  Follow-up in 3 months advised.     Plan:     We will get records from previous specialist.  Follow-up in 3 months for CSA agreement renewal.     See orders in [facility].           Phone call duration:  12 minutes     Salima Lockhart MD      CSA in last year: NO 01/07/19    Random Utox in last year: YES   Ref Range & Units  11/7/19 1140 1/4/19 1655       Amphetamines  Screen Negative  Screen Negative   Screen Negative       Benzodiazepines  Screen Negative  Screen Negative   Screen Negative       Opiates  Screen Negative  Screen Negative   Screen Negative       Phencyclidine  Screen Negative  Screen Negative   Screen Negative       THC  Screen Negative  Screen Negative   Screen Negative       Barbiturates  Screen Negative  Screen Negative   Screen Negative       Cocaine Metabolite  Screen Negative  Screen Negative   Screen Negative       Methadone  Screen Negative  Screen Negative   Screen Negative       Oxycodone  Screen Negative  Screen Negative   Screen Negative       Creatinine, Urine  mg/dL  12.6   86.1     Comment: Urine very dilute; suggest recollection.          Opioids + benzodiazepines? NO

## 2021-06-12 NOTE — TELEPHONE ENCOUNTER
Controlled Substance Refill Request  Medication Name:   Requested Prescriptions     Pending Prescriptions Disp Refills     HYDROcodone-acetaminophen 5-325 mg per tablet 40 tablet 0     Sig: Take 1 tablet by mouth at bedtime as needed for pain.     Date Last Fill: 09/03/20  [facility]ed [facility]  Submit electronically to pharmacy  Controlled Substance Agreement on file:   Encounter-Level CSA Scan Date:    There are no encounter-level csa scan date.        Last office visit:

## 2021-06-12 NOTE — TELEPHONE ENCOUNTER
----- Message from Salvador Freeman MD sent at 10/26/2020 11:27 AM CDT -----  Please set up video visit with me in about 3 weeks.    Salvador Freeman

## 2021-06-12 NOTE — ANESTHESIA CARE TRANSFER NOTE
Last vitals:   Vitals:    10/22/20 2057   BP: 137/75   Pulse: 77   Resp: 14   Temp: 36  C (96.8  F)   SpO2: 100%     Patient's level of consciousness is awake  Spontaneous respirations: yes  Maintains airway independently: yes  Dentition unchanged: yes  Oropharynx: oropharynx clear of all foreign objects    QCDR Measures:  ASA# 20 - Surgical Safety Checklist: WHO surgical safety checklist completed prior to induction    PQRS# 430 - Adult PONV Prevention: 4558F - Pt received => 2 anti-emetic agents (different classes) preop & intraop  ASA# 8 - Peds PONV Prevention: NA - Not pediatric patient, not GA or 2 or more risk factors NOT present  PQRS# 424 - Tamara-op Temp Management: 4559F - At least one body temp DOCUMENTED => 35.5C or 95.9F within required timeframe  PQRS# 426 - PACU Transfer Protocol: - Transfer of care checklist used  ASA# 14 - Acute Post-op Pain: ASA14B - Patient did NOT experience pain >= 7 out of 10     Report called to P4 RN. Transported to floor on room air, VSS, with PACU RN.

## 2021-06-12 NOTE — TELEPHONE ENCOUNTER
Prior Authorization Request  Who s requesting:  Pharmacy  Pharmacy Name and Location:   [facility] [address] [place] [id number]  [phone]  Medication Name:   HYDROcodone-acetaminophen 5-325 mg per tablet  Insurance Plan:   [employer]  Insurance Member ID Number:    ID# [id number]  CoverMyMeds Key: N/A  Informed patient that prior authorizations can take up to 10 business days for response:   NA  Okay to leave a detailed message: Yes      Patient is covered for 34 tablets.  Patient needs PA for 40 tablets.

## 2021-06-12 NOTE — ANESTHESIA PROCEDURE NOTES
Peripheral Block    Start time: 10/22/2020 7:04 PM  End time: 10/22/2020 7:12 PM  post-op analgesia per surgeon order as noted in medical record  Staffing:  Performing  Anesthesiologist: Sarah Morse MD  Preanesthetic Checklist  Completed: patient identified, site marked, risks, benefits, and alternatives discussed, timeout performed, consent obtained, at patient's request, airway assessed, oxygen available, suction available, emergency drugs available and hand hygiene performed  Peripheral Block  Block type: brachial plexus, axillary  Prep: ChloraPrep  Patient position: supine  Patient monitoring: cardiac monitor, continuous pulse oximetry, heart rate and blood pressure  Laterality: right  Injection technique: ultrasound guided    Ultrasound used to visualize needle placement in proximity to nerve being blocked: yes   US used to visualize anesthetic spread  Visualized anatomic structures normal  No Pathological Findings  Permanent ultrasound image captured for medical record  Sterile gel and probe cover used for ultrasound.  Needle  Needle type: Stimuplex   Needle gauge: 20G  Needle length: 4 in  no peripheral nerve catheter placed  Assessment  Injection assessment: no difficulty with injection, negative aspiration for heme, no paresthesia on injection and incremental injection  Additional Notes  Facile placement, single attempt.  Patient tolerated well and without complications.  Excellent images saved and put in chart (network not communicating over wireless).

## 2021-06-12 NOTE — TELEPHONE ENCOUNTER
Central PA team  [phone]  Pool: HE PA MED ([id number])          PA has been initiated.       PA form completed and faxed insurance via Cover My Meds     Key:  [id number]     Medication:  HYDROcodone-acetaminophen 5-325 mg per tablet    Insurance:  [employer]        Response will be received via fax and may take up to 5-10 business days depending on plan

## 2021-06-12 NOTE — ANESTHESIA POSTPROCEDURE EVALUATION
Patient: Paulina Scott  Procedure(s):  IRRIGATION AND DEBRIDEMENT, RIGHT INDEX FINGER, WITH REMOVAL OF METACARPOPHALANGEAL JOINT REPLACEMENT, PLACEMENT OF ANTIBIOTIC SPACER (Right)  Anesthesia type: general    Patient location: [facility] Surgical Floor  Last vitals: No vitals data found for the desired time range.    Post vital signs: stable  Level of consciousness: awake and responds to simple questions  Post-anesthesia pain: pain controlled  Post-anesthesia nausea and vomiting: no  Pulmonary: unassisted, return to baseline  Cardiovascular: stable and blood pressure at baseline  Hydration: adequate  Anesthetic events: no    QCDR Measures:  ASA# 11 - Tamara-op Cardiac Arrest: ASA11B - Patient did NOT experience unanticipated cardiac arrest  ASA# 12 - Tamara-op Mortality Rate: ASA12B - Patient did NOT die  ASA# 13 - PACU Re-Intubation Rate: NA - No ETT / LMA used for case  ASA# 10 - Composite Anes Safety: ASA10A - No serious adverse event    Additional Notes:

## 2021-06-12 NOTE — TELEPHONE ENCOUNTER
Medication:   Requested Prescriptions             Pending Prescriptions Disp Refills     HYDROcodone-acetaminophen 5-325 mg per tablet 40 tablet 0       Sig: Take 1 tablet by mouth at bedtime as needed for pain.         Last Date Filled Per epic, 09/03/20        pulled: No, no access        Only PCP Prescribing?: Unknown, no access     Taken as prescribed from physician notes?: YES  07/02/20  Paulina Scott is a 60 y.o. female who presents for medication check.  She has been on Lexapro 20 mg.  In the past she had tried to wean to a lower dose but needed to get back on the higher dose.  This has been for anxiety.  Currently, she feels stable and does not want to change medication dose.  She used to be on a higher dose of narcotic medication, this is for her polyarthralgia and back pain which was being worked up in [place].  She informs me that she uses 1 pill every night.  She gets 34 pills from the pharmacy.     She denies any new concerns except that her rheumatologist has now retired.  She was following with Dr. Randal Walls for polyarthralgia.  He had done and had an MRI and referred her to [facility].  She did get injections of steroid on her joint that have not helped much.  She informs me that [facility]  We do not have those consultations in our records.     Problem List, Past Medical History, Social History, Family History, Medications, and Allergies reviewed in [facility].        Review of Systems -  Review of Systems - General ROS: negative  Respiratory ROS: negative  Cardiovascular ROS: negative  Neurological ROS: negative           Objective:      There were no vitals taken for this visit.     PSYCH: Mentation appears normal, affect normal/bright, judgement and insight intact, normal speech and appearance well-groomed        Assessment:  1. Anxiety      2. Raynaud's disease without gangrene  Ambulatory referral to Rheumatology   3. Chronic bilateral low back pain  with right-sided sciatica      4. Polyarthralgia  Ambulatory referral to Rheumatology      Patient with anxiety, chronic back pain and polyarthralgia.  I will make a referral to rheumatology as the previous rheumatologist has retired.  I reviewed the records from [facility].  At this time continue on 20 mg Lexapro.  I did review FARZANA and PHQ scales and these are fairly normal.  Discussion with the patient regarding weaning protocol and not to stop abruptly.  At this time patient prefers to continue medication.  Follow-up in 3 months advised.     Plan:     We will get records from previous specialist.  Follow-up in 3 months for CSA agreement renewal.     See orders in [facility].           Phone call duration:  12 minutes     Salima Lockhart MD        CSA in last year: NO 01/07/19     Random Utox in last year: YES   Opioids + benzodiazepines? NO

## 2021-06-12 NOTE — TELEPHONE ENCOUNTER
Date: 11/18/2020 Status: [facility]   Time: 12:00 PM Length: 30   Visit Type: VIDEO VISIT RETURN [1708] Copay: $0.00   Provider: Salvador Freeman MD

## 2021-06-13 NOTE — TELEPHONE ENCOUNTER
Patient Returning Call  Reason for call:  Patient returning call to check on the status of this request.  Information relayed to patient:  Pending providers plan.  Patient has additional questions:  Yes  If YES, what are your questions/concerns:  Patient said I was currently getting the #34/month as this what was allowed by my insurance. I just had surgery and will have another in December.  I have been using this medication for this as I had them. I would prefer the #34/month vs the #40/month /contract.  Please advise  Okay to leave a detailed message?: Yes

## 2021-06-13 NOTE — PATIENT INSTRUCTIONS - HE

## 2021-06-13 NOTE — PATIENT INSTRUCTIONS - HE
Plan for last day of IV cefazolin of 12/2/20, then PICC can be removed.     Have inflammation lab checked on 12/16/20. We can contact you with results.    Salvador Freeman

## 2021-06-13 NOTE — TELEPHONE ENCOUNTER
Controlled Substance Refill Request  Medication Name:   Requested Prescriptions     Pending Prescriptions Disp Refills     HYDROcodone-acetaminophen 5-325 mg per tablet 20 tablet 0     Sig: Take 1 tablet by mouth every 6 (six) hours as needed.     Date Last Fill: 10/26/20  Is patient out of medication?: No, 2 days left  Patient notified refills processed within 3 business days:  Yes  [facility]  Submit electronically to pharmacy  Controlled Substance Agreement on file:   Encounter-Level CSA Scan Date:    There are no encounter-level csa scan date.        Last office visit:

## 2021-06-13 NOTE — PROGRESS NOTES
"Paulina Scott is a 60 y.o. female who is being evaluated via a billable video visit.      The patient has been notified of following:     \"This video visit will be conducted via a call between you and your physician/provider. We have found that certain health care needs can be provided without the need for an in-person physical exam.  This service lets us provide the care you need with a video conversation.  If a prescription is necessary we can send it directly to your pharmacy.  If lab work is needed we can place an order for that and you can then stop by our lab to have the test done at a later time.    Video visits are billed at different rates depending on your insurance coverage. Please reach out to your insurance provider with any questions.    If during the course of the call the physician/provider feels a video visit is not appropriate, you will not be charged for this service.\"    Patient has given verbal consent to a Video visit? Yes  How would you like to obtain your AVS? AVS Preference: MyChart.  If dropped by the video visit, the video invitation should be sent to: Text to cell phone: [phone]  Will anyone else be joining your video visit? No    Video Start Time: 9:45 AM    Additional provider notes:   She remains on cefazolin. She is tolerating this well. Has some pain at hand but this is improved. Sutures were removed when she saw Dr. Dumont last week, and she reports that he was pleased with progress. She continues on vancomycin PO as c diff prophylaxis. Denies diarrhea.     Surgery has been scheduled for 12/18/20. She is hoping to have surgery completed prior to end of year and is planning to get back to work mid-January.     Exam:  No distress  Breathing comfortably  PICC in L UE  Right hand with brace in place. 2nd digit ROM remains limited.   No apparent rash    Labs, cultures reviewed.      ASSESSMENT:  1. 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 " Underwent I+D with removal of prosthesis 10/22, placement of antibiotic spacer.  Cultures grew methicillin-sensitive staph epidermidis. Prosthetic finger joint infections are not commonly seen mainly due to these joint implantations being rare. We do not have the robust data for treatment of these infections that we do for larger joints, so my treatment plan is extrapolated from the larger joint standard of care. She seems to be doing well. CRP was not elevated pre-op, increased slightly post-op, and has since been normal. Plan at least 2 weeks off antibiotics prior to re-implantation.   2. S/p right 2nd MCP joint arthroplasty 9/18/20  3. Severe osteoarthritis multiple joints. Previous back surgeries, also joint replacement of toe.   4. Sulfa allergy. Tetracycline and erythromycin intolerance.   5. H/o C diff 20 years ago. On prophylactic oral vancomycin while on cefazolin.     PLAN:  Cefazolin 2gm IV q8h, 6 weeks = 12/2. PICC can be removed after last dose.   P.o. vancomycin daily while on cefazolin, then this can be stopped after IV finished.  Weekly labs while on IV antibiotics:  CBC with differential, BMP, C-reactive protein  Plan CRP check 12/16.     Could send biopsy for frozen section at the time of surgery and if minimal inflammation, proceed.     [facility] will be contacted about stop date.    Discussed with Dr. Dumont after the visit. Plan reviewed.     She can follow up with me as needed.     Video-Visit Details    Type of service:  Video Visit    Video End Time (time video stopped): 9:55 AM  Originating Location (pt. Location): Home    Distant Location (provider location):  [facility]     Platform used for Video Visit: Kisha Freeman MD

## 2021-06-13 NOTE — TELEPHONE ENCOUNTER
Medication: HYDROcodone-acetaminophen 5-325 mg per tablet    Last Date Filled: 10/26/20     pulled: NO  Do not have access to  under Dr. Lockhart    Only PCP Prescribing?: YES    Taken as prescribed from physician notes?: YES  Patient takes 1 a day of hydrocodone that she can continue as before.  She was due for a controlled substance agreement and a drug urine screen which was obtained today.    CSA in last year: YES    Random Utox in last year: YES    Opioids + benzodiazepines? NO

## 2021-06-13 NOTE — TELEPHONE ENCOUNTER
Medication Question or Clarification  Who is calling: Patient  What medication are you calling about (include dose and sig)?:    Disp Refills Start End ELLIS   HYDROcodone-acetaminophen 5-325 mg per tablet 20 tablet 0 11/13/2020  No   Sig - Route: Take 1 tablet by mouth every 6 (six) hours as needed. - Oral   Sent to pharmacy as: HYDROcodone 5 mg-acetaminophen 325 mg tablet   Earliest Fill Date: 11/13/2020   E-Prescribing Status: Receipt confirmed by pharmacy (11/13/2020  8:30 AM CST)       Who prescribed the medication?: Salima Lockhart MD  What is your question/concern?: I am calling to ask why I only got #20 tablets when I usually get #34?  We never discussed decreasing this and this is not enough to get me through the month.   [facility]  Okay to leave a detailed message?: Yes

## 2021-06-13 NOTE — PROGRESS NOTES
[facility]  [address] [facility] [address]  Dept: [phone]  Dept Fax: [phone]  Primary Provider: Roxie Lockhart MD  Pre-op Performing Provider: ROXIE LOCKHART    PREOPERATIVE EVALUATION:  Today's date: 12/11/2020    Paulina Scott is a 60 y.o. female who presents for a preoperative evaluation.    Surgical Information:  Surgery/Procedure: RT Index finger metacarpal phalangeal joint arthroplasty  Surgery Location: [facility]  Surgeon: Sr Dumont  Surgery Date: 12/18/2020  Time of Surgery: 730 am  Where patient plans to recover: At home with family  Fax number for surgical facility: Fax # [phone]    Type of Anesthesia Anticipated: Local with MAC    Subjective     HPI related to upcoming procedure:  Replacement of finger joint after recent septic joint after previous surgery in September 2020    Preop Questions 12/11/2020   Have you ever had a heart attack or stroke? No   Have you ever had surgery on your heart or blood vessels, such as a stent placement, a coronary artery bypass, or surgery on an artery in your head, neck, heart, or legs? No   Do you have chest pain with activity? No   Do you have a history of  heart failure? No   Do you currently have a cold, bronchitis or symptoms of other infection? No   Do you have a cough, shortness of breath, or wheezing? No   Do you or anyone in your family have previous history of blood clots? No   Do you or does anyone in your family have a serious bleeding problem such as prolonged bleeding following surgeries or cuts? No   Have you ever had problems with anemia or been told to take iron pills? No   Have you had any abnormal blood loss such as black, tarry or bloody stools, or abnormal vaginal bleeding? No   Have you ever had a blood transfusion? No   Are you willing to have a blood transfusion if it is medically needed before, during, or after your surgery? Yes   Have you or any of your relatives ever  had problems with anesthesia? No   Do you have sleep apnea, excessive snoring or daytime drowsiness? No   Do you have any artifical heart valves or other implanted medical devices like a pacemaker, defibrillator, or continuous glucose monitor? No   Do you have artificial joints? YES - 2 MTP   Are you allergic to latex? No   Is there any chance that you may be pregnant? -     Health Care Directive:  Patient does not have a Health Care Directive or Living Will: Discussed advance care planning with patient; information given to patient to review.    Preoperative Review of :  No concern   :[id number]}  See problem list for active medical problems.  Problems all longstanding and stable, except as noted/documented.  See ROS for pertinent symptoms related to these conditions.      Review of Systems  CONSTITUTIONAL: NEGATIVE for fever, chills, change in weight  INTEGUMENTARY/SKIN: NEGATIVE for worrisome rashes, moles or lesions  EYES: NEGATIVE for vision changes or irritation  ENT/MOUTH: NEGATIVE for ear, mouth and throat problems  RESP: NEGATIVE for significant cough or SOB  BREAST: NEGATIVE for masses, tenderness or discharge  CV: NEGATIVE for chest pain, palpitations or peripheral edema  GI: NEGATIVE for nausea, abdominal pain, heartburn, or change in bowel habits  : NEGATIVE for frequency, dysuria, or hematuria  NEURO: NEGATIVE for weakness, dizziness or paresthesias  ENDOCRINE: NEGATIVE for temperature intolerance, skin/hair changes  HEME: NEGATIVE for bleeding problems  PSYCHIATRIC: NEGATIVE for changes in mood or affect    Patient Active Problem List    Diagnosis Date Noted     Septic joint (H) 10/22/2020     Pyogenic arthritis of right hand, due to unspecified organism (H) 10/22/2020     PONV (postoperative nausea and vomiting) 09/11/2020     On hormone replacement therapy 09/11/2020     Polyarthralgia 07/02/2020     Anxiety 07/02/2020     Abnormal mammogram 05/13/2019     Narcotic drug use- For chronic back pain-  # 40 pills / 1-2 month CSA/ DAM- 11/2019 02/18/2019     Controlled substance agreement signed 02/18/2019     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     Chronic bilateral low back pain with right-sided sciatica 01/04/2019     Raynaud's disease without gangrene 01/04/2019     Chronic constipation 01/04/2019     Pain of right hand 01/04/2019     Past Medical History:   Diagnosis Date     Chronic bilateral low back pain with right-sided sciatica 1/4/2019     Chronic constipation 1/4/2019     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     Osteoarthritis      Pain of right hand 1/4/2019     Raynaud's disease without gangrene 1/4/2019     Past Surgical History:   Procedure Laterality Date     BACK SURGERY       HARDWARE REMOVAL  10/22/2020    Procedure: WITH REMOVAL OF METACARPOPHALANGEAL JOINT REPLACEMENT, PLACEMENT OF ANTIBIOTIC SPACER;  Surgeon: Reagan Wright MD;  Location: [facility];  Service: Orthopedics     HIP SURGERY       PIC  10/25/2020          Current Outpatient Medications   Medication Sig Dispense Refill     biotin 5 mg Tab Take 5 mg by mouth daily.       calcium carbonate (OS-MARISSA) 500 mg calcium (1,250 mg) chewable tablet Chew 2 tablets daily.        cholecalciferol, vitamin D3, 5,000 unit Tab Take 5,000 Units by mouth daily.       escitalopram oxalate (LEXAPRO) 20 MG tablet Take 1 tablet (20 mg total) by mouth daily. 90 tablet 2     estradioL (VIVELLE-DOT) 0.05 mg/24 hr Place 1 patch on the skin 2 (two) times a week. Sundays and Thursdays       gabapentin (NEURONTIN) 100 MG capsule Take 200-300 mg by mouth at bedtime.   0     glucosamine/chondr wright A sod (OSTEO BI-FLEX ORAL) Take 1 tablet by mouth daily.       HYDROcodone-acetaminophen 5-325 mg per tablet Take 1 tablet by mouth every 6 (six) hours as needed. 20 tablet 0     lysine 500 mg Tab Take 500 mg by mouth daily.       mv-min/iron/folic/calcium/vitK (WOMEN'S 50 PLUS MULTIVIT-IRON ORAL) Take 1 tablet by mouth daily.       peg 400-propylene glycol  "(SYSTANE, PROPYLENE GLYCOL,) 0.4-0.3 % Drop Administer 1 drop to both eyes 4 (four) times a day as needed.       TURMERIC ORAL Take 1 capsule by mouth daily.       docusate sodium (COLACE) 100 MG capsule Take 300 mg by mouth every evening.       hydrOXYzine pamoate (VISTARIL) 25 MG capsule Take 1 capsule (25 mg total) by mouth every 6 (six) hours as needed for other (pain, nausea). 20 capsule 0     linaCLOtide (LINZESS) 290 mcg cap capsule Take 290 mcg by mouth daily.       naproxen sodium (ALEVE) 220 MG tablet Take 660 mg by mouth every 12 (twelve) hours as needed for pain.       No current facility-administered medications for this visit.        Allergies   Allergen Reactions     Reglan [Metoclopramide Hcl]      Muscles      Erythromycin Nausea And Vomiting and Rash     Sulfa (Sulfonamide Antibiotics) Rash     Tetracycline Nausea And Vomiting and Rash       Social History     Tobacco Use     Smoking status: Never Smoker     Smokeless tobacco: Never Used   Substance Use Topics     Alcohol use: Yes     Frequency: 2-4 times a month     Drinks per session: 1 or 2      Family History   Problem Relation Age of Onset     Dementia Mother         86     Cancer Mother      Breast cancer Mother 60     Cancer Maternal Grandmother      Cancer Paternal Grandmother      Social History     Substance and Sexual Activity   Drug Use No        Objective     /76 (Patient Site: Left Arm, Patient Position: Sitting, Cuff Size: Adult Regular)   Pulse 78   Resp 16   Ht 5' 4.96\" (1.65 m)   Wt 129 lb (58.5 kg)   SpO2 98%   BMI 21.49 kg/m    Physical Exam  GENERAL APPEARANCE: healthy, alert and no distress  EYES: Eyes grossly normal to inspection, PERRL and conjunctivae and sclerae normal  NECK: no adenopathy, no asymmetry, masses, or scars and thyroid normal to palpation  RESP: lungs clear to auscultation - no rales, rhonchi or wheezes  CV: regular rate and rhythm, normal S1 S2, no S3 or S4 and no murmur, click or rub  ABDOMEN: " soft, nontender, no HSM or masses and bowel sounds normal  NEURO: Normal strength and tone, sensory exam grossly normal, mentation intact and speech normal    Recent Labs   Lab Test 12/03/20  1050 11/26/20  0915 10/22/20  1145 10/22/20  1145   HGB 11.9* 12.5   < > 12.7    257   < > 256   INR  --   --   --  1.05    136   < > 141   K 4.4 4.1   < > 4.1   CREATININE 0.70 0.75   < > 0.76    < > = values in this interval not displayed.        PRE-OP Diagnostics:   No labs were ordered during this visit.  No EKG required for low risk surgery (cataract, skin procedure, breast biopsy, etc).    REVISED CARDIAC RISK INDEX (RCRI)   The patient has the following serious cardiovascular risks for perioperative complications:   - No serious cardiac risks = 0 points    RCRI INTERPRETATION: 0 points: Class I (very low risk - 0.4% complication rate)         Assessment & Plan    1. Preop general physical exam     2. On hormone replacement therapy     3. Pyogenic arthritis of right hand, due to unspecified organism (H)       I have reviewed recent follow-up with infectious disease after a septic joint.  Follow-up have normalized.  She is due to have lab just before her surgery.  She will be on prophylactic antibiotics per patient     The proposed surgical procedure is considered L0W risk.       Risks and Recommendations:  The patient has the following additional risks and recommendations for perioperative complications:  Infection:    -She will be on antibiotic per recommendation after recent septic joint  -She is on estrogen patch that does put her at increased risk of perioperative venous thrombosis.  Patient is aware about this risk.  However, surgery does not require any prolonged immobilization.    Medication Instructions:  Patient is to take all scheduled medications on the day of surgery EXCEPT for modifications listed below:  She will hold her Linzess medication    RECOMMENDATION:  APPROVAL GIVEN to proceed with  proposed procedure, without further diagnostic evaluation.    Signed Electronically by: Salima Lockhart MD    Copy of this evaluation report is provided to requesting physician.    Preop [facility] Preop Guidelines    Revised Cardiac Risk Index

## 2021-06-13 NOTE — TELEPHONE ENCOUNTER
I am not sure as to why this happened.  For future refills, we will send 30pills/month.  Her original contract was 40 pills every 1 to 2 months and I believe that this must have been interpreted by pharmacy and insurance as 20 pills/month.  Salima Lockhart MD  11/18/2020

## 2021-06-14 NOTE — TELEPHONE ENCOUNTER
I reviewed results with her. She is doing well. She sees Dr. Dumont tomorrow. Taking cephalexin post-op.     Salvador Freeman

## 2021-06-14 NOTE — TELEPHONE ENCOUNTER
Please ask patient regarding her use.      The initial agreement was #40 pills every 2 months.  Subsequently she had mentioned that she takes 1 daily and then it was changed to #30 pills every 1 month with pharmacy dispensing 34 pills.    How would she like to pursue at this time.  I will not be increasing her medication and I am willing to give #30 pills every month.  We will.    Salima Lockhart MD  1/31/2021

## 2021-06-14 NOTE — TELEPHONE ENCOUNTER
Pt called back, relayed message to patient- she is okay with 30# per month   She is taking it daily at night but occasionally will take one additional pill during day if increased pain but that is rare

## 2021-06-14 NOTE — TELEPHONE ENCOUNTER
Reason for Call:  Medication Refill    Do you use a [facility]?  Name of the pharmacy and phone number for the current request: [facility] in [place] on [address]     Name of the medication requested: Hydrocodone-acetaminophen     Other request: Patient would like refill send to her pharmacy. Patient stated that last time provider refilled Rx for 30 tablets and that it should be 40 tablets. Patient would like to know why the changes.     Can we leave a detailed message on this number? Yes    Phone number patient can be reached at:   Cell number on file:    Telephone Information:   Mobile [phone]       Best Time: Any time    Call taken on 1/29/2021 at 9:02 AM by Ronaldo Navas

## 2021-06-14 NOTE — TELEPHONE ENCOUNTER
Reason for Call:  Medication or medication refill: refill    Do you use a [facility]?  [facility] [place] [facility]    Name of the medication requested: Hydrocodone Acetaminophen 5-325mg    Other request: pt will be out by weekend    Can we leave a detailed message on this number? Yes    Phone number patient can be reached at: Home number on file [phone] (home)    Best Time: anytime    Call taken on 12/28/2020 at 10:59 AM by Ya Mayo

## 2021-06-14 NOTE — TELEPHONE ENCOUNTER
Medication: HYDROcodone-acetaminophen 5-325 mg per tablet    Last Date Filled: 11/20/20     pulled: NO  Do not have access to  under Dr. Lockhart    Only PCP Prescribing?: YES    Taken as prescribed from physician notes?: YES  Patient takes 1 a day of hydrocodone that she can continue as before.  She was due for a controlled substance agreement and a drug urine screen which was obtained today.    CSA in last year: YES    Random Utox in last year: YES    Opioids + benzodiazepines? NO

## 2021-06-15 NOTE — TELEPHONE ENCOUNTER
Reason for Call:  Medication or medication refill:    Do you use a [facility]?  Name of the [facility] and phone [id number] [place] - [address]    Name of the medication requested: hydrocodone-acetaminophen    Other request: na    Can we leave a detailed message on this number? Yes    Phone number patient can be reached at:   Cell number on file:    Telephone Information:   Mobile [phone]       Best Time: na    Call taken on 3/1/2021 at 9:09 AM by Ya Mayo

## 2021-06-15 NOTE — PROGRESS NOTES
Assessment:     1. Polyarthralgia     2. Hearing loss, unspecified hearing loss type, unspecified laterality  Hearing Screening   3. Pyogenic arthritis of right hand, due to unspecified organism (H)     4. Controlled substance agreement signed       Medical decision making: Patient with polyarthralgia, back pain and recent surgery for pyogenic arthritis of the right hand after surgical intervention presents today for follow-up.  Hand surgery is healing and she is continue with physical therapy.  I discussed with the patient that I am comfortable doing current management for her gabapentin and hydrocodone.  If she has other arthralgias then she needs to follow-up with rheumatology.  Patient verbalizes understanding.  Discussed every 3 month follow-up for narcotic refill and patient is agreeable.  PDMP reviewed.  Hearing exam was normal.  I  Plan:      The diagnosis was discussed with the patient and evaluation and treatment plans outlined.      Follow up: Return in about 3 months (around 6/15/2021) for Annual physical, recheck.       Patient Instructions   I have seen you today for follow-up.    I reviewed records from Dr. Walls lab work.  HLA-B27 testing was negative.  Similarly sed rate and CRP were low.  This shows unlikely that there is an inflammatory joint disease.    I will be refilling your gabapentin and hydrocodone.    Hearing test is being done today.             Subjective:      Paulina Scott is a  female who presents for evaluation of   Chief Complaint   Patient presents with     Follow-up     No questions or concerns. Bharat had hand surgery currently in PT for the right hand.      Patient is here today for follow-up.  She has recovered from her right index finger metacarpophalangeal joint surgery and is currently going physical therapy.  She used to follow with Dr. Walls rheumatology for her polyarthralgias.  Last year, I had sent her to [facility] rheumatology as her primary was  "retiring.  However, due to pandemic that never got pursued.  This is due to the fact patient takes gabapentin for her chronic pain    Patient feels that her hearing is slightly poor.  She is able to hear everything but sometimes lip reads in puts the volume of the TV higher.    The following portions of the patient's history were reviewed and updated as appropriate: allergies, current medications, past family history, past medical history, past social history, past surgical history and problem list.    Review of Systems  Constitutional: negative  Respiratory: negative  Cardiovascular: negative       Objective:     /78 (Patient Site: Left Arm, Patient Position: Sitting, Cuff Size: Adult Regular)   Pulse 67   Ht 5' 5\" (1.651 m)   Wt 131 lb (59.4 kg)   SpO2 98%   BMI 21.80 kg/m      GENERAL: Healthy, alert and no distress  EYES: Eyes grossly normal to inspection. No discharge or erythema, or obvious scleral/conjunctival abnormalities.  HENT: ear canals and TM's normal  RESP: No audible wheeze, cough, or visible cyanosis.  No visible retractions or increased work of breathing.    MS: Right hand joint status post surgical intervention.  No erythema or discharge.  NEURO: Cranial nerves grossly intact. Mentation and speech appropriate for age.  PSYCH: Mentation appears normal, affect normal/bright, judgement and insight intact, normal speech and appearance well-groomed    Hearing Screening (03/15/2021)  Edited by: Kellie Ortiz CMA   125hz 250hz 500hz 1000hz 2000hz 3000hz 4000hz 6000hz 8000hz   Right ear   30 25 20  20 25    Left ear   30 25 20  20 25        "

## 2021-06-15 NOTE — PATIENT INSTRUCTIONS - HE
I have seen you today for follow-up.    I reviewed records from Dr. Walls lab work.  HLA-B27 testing was negative.  Similarly sed rate and CRP were low.  This shows unlikely that there is an inflammatory joint disease.    I will be refilling your gabapentin and hydrocodone.    Hearing test is being done today.

## 2021-06-16 PROBLEM — K59.09 CHRONIC CONSTIPATION: Status: ACTIVE | Noted: 2019-01-04

## 2021-06-16 PROBLEM — Z79.890 ON HORMONE REPLACEMENT THERAPY: Status: ACTIVE | Noted: 2020-09-11

## 2021-06-16 PROBLEM — F41.9 ANXIETY: Status: ACTIVE | Noted: 2020-07-02

## 2021-06-16 PROBLEM — R92.8 ABNORMAL MAMMOGRAM: Status: ACTIVE | Noted: 2019-05-13

## 2021-06-16 PROBLEM — M79.641 PAIN OF RIGHT HAND: Status: ACTIVE | Noted: 2019-01-04

## 2021-06-16 PROBLEM — Q52.0: Status: ACTIVE | Noted: 2019-01-04

## 2021-06-16 PROBLEM — M00.9: Status: ACTIVE | Noted: 2020-10-22

## 2021-06-16 PROBLEM — I73.00 RAYNAUD'S DISEASE WITHOUT GANGRENE: Status: ACTIVE | Noted: 2019-01-04

## 2021-06-16 PROBLEM — G89.29 CHRONIC BILATERAL LOW BACK PAIN WITH RIGHT-SIDED SCIATICA: Status: ACTIVE | Noted: 2019-01-04

## 2021-06-16 PROBLEM — F11.90 NARCOTIC DRUG USE: Status: ACTIVE | Noted: 2019-02-18

## 2021-06-16 PROBLEM — M25.50 POLYARTHRALGIA: Status: ACTIVE | Noted: 2020-07-02

## 2021-06-16 PROBLEM — R11.2 PONV (POSTOPERATIVE NAUSEA AND VOMITING): Status: ACTIVE | Noted: 2020-09-11

## 2021-06-16 PROBLEM — Z98.890 PONV (POSTOPERATIVE NAUSEA AND VOMITING): Status: ACTIVE | Noted: 2020-09-11

## 2021-06-16 PROBLEM — Z79.899 CONTROLLED SUBSTANCE AGREEMENT SIGNED: Status: ACTIVE | Noted: 2019-02-18

## 2021-06-16 PROBLEM — M54.41 CHRONIC BILATERAL LOW BACK PAIN WITH RIGHT-SIDED SCIATICA: Status: ACTIVE | Noted: 2019-01-04

## 2021-06-16 PROBLEM — M00.9 SEPTIC JOINT (H): Status: ACTIVE | Noted: 2020-10-22

## 2021-06-16 NOTE — TELEPHONE ENCOUNTER
Reason for Call:  Medication or medication refill:    Do you use a [facility]?  Name of the pharmacy and phone number for the current request: [id number] - [facility], [place] - [address]    Name of the medication requested: HYDROcodone-acetaminophen 5-325 mg per tablet    Other request: na    Can we leave a detailed message on this number? Yes    Phone number patient can be reached at:   Cell number on file:    Telephone Information:   Mobile [phone]       Best Time: na    Call taken on 3/30/2021 at 9:02 AM by Ya Mayo

## 2021-06-16 NOTE — TELEPHONE ENCOUNTER
Medication: hydrocodone     Last Date Filled 3/1/21      pulled: NO      Only PCP Prescribing?: YES    Taken as prescribed from physician notes?: YES  Medical decision making: Patient with polyarthralgia, back pain and recent surgery for pyogenic arthritis of the right hand after surgical intervention presents today for follow-up.  Hand surgery is healing and she is continue with physical therapy.  I discussed with the patient that I am comfortable doing current management for her gabapentin and hydrocodone.  If she has other arthralgias then she needs to follow-up with rheumatology.  Patient verbalizes understanding.  Discussed every 3 month follow-up for narcotic refill and patient is agreeable.  PDMP reviewed.    CSA in last year: YES    Random Utox in last year: YES    Opioids + benzodiazepines? NO

## 2021-06-16 NOTE — TELEPHONE ENCOUNTER
Telephone Encounter by Kellie Ortiz CMA at 2/11/2020 10:47 AM     Author: Kellie Ortiz CMA Service: -- Author Type: Certified Medical Assistant    Filed: 2/11/2020 10:48 AM Encounter Date: 2/11/2020 Status: Signed    : Kellie Ortiz CMA (Certified Medical Assistant)       Medication:   HYDROcodone-acetaminophen 5-325 mg per tablet 40 tablet 0        Sig: Take 1 tablet by mouth at bedtime as needed for pain.         Last Date Filled 01/10/2020     pulled: YES      Only PCP Prescribing?: YES    Taken as prescribed from physician notes?: YES  Patient is here for her narcotic refill.  She takes them  judicially.  She has followed with rheumatology.  She has a small rash on her dorsum of the right foot that she has been treating for a fungal infection.  This is rough.  It does not itch or hurt.  She has been using OTC clotrimazole without any benefit.  It has been present for a few months.  CSA in last year: YES    Random Utox in last year: YES    Opioids + benzodiazepines? NO

## 2021-06-17 NOTE — PATIENT INSTRUCTIONS - HE
Patient Instructions by Salima Lockhart MD at 5/13/2019  4:30 PM     Author: Salima Lockhart MD Service: -- Author Type: Physician    Filed: 5/13/2019  5:29 PM Encounter Date: 5/13/2019 Status: Signed    : Salima Lockhart MD (Physician)       Patient Education     Benign Paroxysmal Positional Vertigo     Your health care provider may move your head in certain ways to treat your BPPV.     Benign paroxysmal positional vertigo (BPPV) is a problem with the inner ear. The inner ear contains the vestibular system. This system is what helps you keep your balance. BPPV causes a feeling of spinning. It is a common problem of the vestibular system.  Understanding the vestibular system  The vestibular system of the ear is made up of very tiny parts. They include the utricle, saccule, and semicircular canals. The utricle is a tiny organ that contains calcium crystals. In some people, the crystals can move into the semicircular canals. When this happens, the system no longer works as it should. This causes BPPV. Benign means it is not life threatening. Paroxysmal means it happens suddenly. Positional means that it happens when you move your head. Vertigo is a feeling of spinning.  What causes BPPV?  Causes include injury to your head or neck. Other problems with the vestibular system may cause BPPV. In many people, the cause of BPPV is not known.  Symptoms of BPPV  You many have repeated feelings of spinning (vertigo). The vertigo usually lasts less than 1 minute. Some movements, such as rolling over in bed, can bring on vertigo.  Diagnosing BPPV  Your primary healthcare provider may diagnose and treat your BPPV. Or you may see an ear, nose, and throat doctor (otolaryngologist). In some cases, you may see a nervous system doctor (neurologist).  The healthcare provider will ask about your symptoms and your medical history. He or she will examine you. You may have hearing and balance tests. As part of the exam, your  healthcare provider may have you move your head and body in certain ways. If you have BPPV, the movements can bring on vertigo. Your provider will also look for abnormal movements of your eyes. You may have other tests to check your vestibular or nervous systems.  Treatment for BPPV  Your healthcare provider may try to move the calcium crystals. This is done by having you move your head and neck in certain ways. This treatment is safe and often works well. You may also be told to do these movements at home. You may still have vertigo for a few weeks. Your healthcare provider will recheck your symptoms, usually in about a month. Special physical therapy may also be part of treatment. In rare cases, surgery may be needed for BPPV that does not go away.     When to call the healthcare provider  Call your healthcare provider right away if you have any of these:    Symptoms that do not go away with treatment    Symptoms that get worse    New symptoms   Date Last Reviewed: 5/1/2017 2000-2017 [employer]. [address] [place] [address], [place] [id number]. All rights reserved. This information is not intended as a substitute for professional medical care. Always follow your healthcare professional's instructions.

## 2021-06-17 NOTE — TELEPHONE ENCOUNTER
Telephone Encounter by Lilly Jimenes at 10/9/2020  1:27 PM     Author: Lilly Jimenes Service: -- Author Type: Patient Access    Filed: 10/9/2020  1:31 PM Encounter Date: 10/7/2020 Status: Signed    : Lilly Jimenes (Patient Access)       [place] APPROVED:    Approval start date: 10/09/20  Approval end date:  04/07/21    Pharmacy has been notified of approval and will contact patient when medication is ready for pickup.

## 2021-06-17 NOTE — PATIENT INSTRUCTIONS - HE
"Patient Instructions by Salima Lockhart MD at 5/16/2019  8:10 AM     Author: Salima Lockhart MD Service: -- Author Type: Physician    Filed: 5/16/2019  8:45 AM Encounter Date: 5/16/2019 Status: Signed    : Salima Lockhart MD (Physician)       Patient Education     Understanding Body Mass Index (BMI)  Body mass index (BMI) is a method of screening for a weight category using the ratio of your height to your weight. The BMI is a measure of overweight that is corrected for height. Knowing your BMI is a way to tell if you are at a healthy weight. The higher your BMI, the greater your risk for weight-related health problems.  What BMI means    BMI below 18.5: Underweight    BMI 18.5 to 24.9: Healthy weight or \"ideal body weight\"     BMI 25 to 29.9: Overweight    BMI 30 and over: Obese    BMI 40 and over: Severe obesity   Online BMI Calculators  Find your BMI with an online BMI calculator tool, such as these from the CDC:    BMI calculator for adults    BMI calculator for children and teens   Using the BMI chart  To figure out your BMI, find your height and weight (or the numbers closest to them) on the table below. Follow each column of numbers to where your height and weight meet on the table. That is your BMI.    Date Last Reviewed: 7/1/2016 2000-2017 [employer]. [address] [place] [address]. All rights reserved. This information is not intended as a substitute for professional medical care. Always follow your healthcare professional's instructions.                "

## 2021-06-17 NOTE — TELEPHONE ENCOUNTER
Telephone Encounter by Genesis Pedro CMA at 7/3/2020  4:00 PM     Author: Genesis Pedro CMA Service: -- Author Type: Certified Medical Assistant    Filed: 7/3/2020  4:31 PM Encounter Date: 7/3/2020 Status: Signed    : Genesis Pedro CMA (Certified Medical Assistant)       Salima Lockhart MD P Bhardwaj, Kartika Care Team Pool               Please obtain records from [facility].  Also from Dr. GARCIA, who retired as a rheumatologist.

## 2021-06-17 NOTE — TELEPHONE ENCOUNTER
Telephone Encounter by Kellie Ortiz CMA at 5/26/2020  9:07 AM     Author: Kellie Ortiz CMA Service: -- Author Type: Certified Medical Assistant    Filed: 5/26/2020  9:10 AM Encounter Date: 5/26/2020 Status: Signed    : Kellie Ortiz CMA (Certified Medical Assistant)       Medication:   HYDROcodone-acetaminophen 5-325 mg per tablet 40 tablet 0        Sig: Take 1 tablet by mouth at bedtime as needed for pain.         Last Date Filled 04/20/2020     pulled: YES      Only PCP Prescribing?: YES    Taken as prescribed from physician notes?: YES  Patient is here for her narcotic refill.  She takes them  judicially.  She has followed with rheumatology.  She has a small rash on her dorsum of the right foot that she has been treating for a fungal infection.  This is rough.  It does not itch or hurt.  She has been using OTC clotrimazole without any benefit.  It has been present for a few months.    CSA in last year: NO-01/072019    Random Utox in last year: YES    Opioids + benzodiazepines? NO

## 2021-06-17 NOTE — TELEPHONE ENCOUNTER
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  Hand surgery is healing and she is continue with physical therapy.  I discussed with the patient that I am comfortable doing current management for her gabapentin and hydrocodone.  If she has other arthralgias then she needs to follow-up with rheumatology.  Patient verbalizes understanding.  Discussed every 3 month follow-up for narcotic refill and patient is agreeable.  PDMP reviewed.)     CSA in last year: YES 9/11/2020    Random Utox in last year: YES 9/11/2020    Opioids + benzodiazepines? NO

## 2021-06-18 NOTE — LETTER
Letter by Salima Lockhart MD at      Author: Salima Lockhart MD Service: -- Author Type: --    Filed:  Encounter Date: 1/4/2019 Status: (Other)         [facility] MEDICINE/OB  01/04/19    Patient: Paulina Scott  YOB: 1960  Medical Record Number: [id number]                                                                  Opioid / Opioid Plus Controlled Substance Agreement    I understand that my care provider has prescribed an opioid (narcotic) controlled substance to help manage my condition(s). I am taking this medicine to help me function or work. I know this is strong medicine, and that it can cause serious side effects. Opioid medicine can be sedating, addicting and may cause a dependency on the drug. They can affect my ability to drive or think, and cause depression. They need to be taken exactly as prescribed. Combining opioids with certain medicines or chemicals (such as cocaine, sedatives and tranquilizers, sleeping pills, meth) can be dangerous or even fatal. Also, if I stop opioids suddenly, I may have severe withdrawal symptoms. Last, I understand that opioids do not work for all types of pain nor for all patients. If not helpful, I may be asked to stop them.    The risks, benefits, and side effects of these medicine(s) were explained to me. I agree that:    1. I will take part in other treatments as advised by my care team. This may be psychiatry or counseling, physical therapy, behavioral therapy, group treatment or a referral to a pain clinic. I will reduce or stop my medicine when my care team tells me to do so.  2. I will take my medicines as prescribed. I will not change the dose or schedule unless my care team tells me to. There will be no refills if I run out early.  I may be contactedwithout warning and asked to complete a urine drug test or pill count at any time.   3. I will keep all my appointments, and understand this is part of the monitoring of opioids. My  care team may require an office visit for EVERY opioid/controlled substance refill. If I miss appointments or dont follow instructions, my care team may stop my medicine.  4. I will not ask other providers to prescribe controlled substances, and I will not accept controlled substances from other people. If I need another prescribed controlled substance for a new reason, I will tell my care team within 1 business day.  5. I will use one pharmacy to fill all of my controlled substance prescriptions, and it is up to me to make sure that I do not run out of my medicines on weekends or holidays. If my care team is willing to refill my opioid prescription without a visit, I must request refills only during office hours, refills may take up to 3 days to process, and it may take up to 5 to 7 days for my medicine to be mailed and ready at my pharmacy. Prescriptions will not be mailed anywhere except my pharmacy.        [id number]  Rev 12/18         Registration to scan to EHR                             Page 1 of 2               Controlled Substance Agreement Opioid        [facility] MEDICINE/OB  01/04/19  Patient: Paulina Scott  YOB: 1960  Medical Record Number: [id number]                                                                  6. I am responsible for my prescriptions. If the medicine/prescription is lost or stolen, it will not be replaced. I also agree not to share controlled substance medicines with anyone.  7. I agree to not use ANY illegal or recreational drugs. This includes marijuana, cocaine, bath salts or other drugs. I agree not to use alcohol unless my care team says I may.          I agree to give urine samples whenever asked. If I dont give a urine sample, the care team may stop my medicine.    8. If I enroll in the [place] Medical Marijuana program, I will tell my care team. I will also sign an agreement to share my medical records with my care team.   9. I will bring in my list  of medicines (or my medicine bottles) each time I come to the clinic.   10. I will tell my care team right away if I become pregnant or have a new medical problem treated outside of my regular clinic.  11. I understand that this medicine can affect my thinking and judgment. It may be unsafe for me to drive, use machinery and do dangerous tasks. I will not do any of these things until I know how the medicine affects me. If my dose changes, I will wait to see how it affects me. I will contact my care team if I have concerns about medicine side effects.    I understand that if I do not follow any of the conditions above, my prescriptions or treatment may be stopped.      I agree that my provider, clinic care team, and pharmacy may work with any city, state or federal law enforcement agency that investigates the misuse, sale, or other diversion of my controlled medicine. I will allow my provider to discuss my care with or share a copy of this agreement with any other treating provider, pharmacy or emergency room where I receive care. I agree to give up (waive) any right of privacy or confidentiality with respect to these consents.     I have read this agreement and have asked questions about anything I did not understand.      ________________________________________________________________________  Patient signature - Date/Time -  Paulina Scott                                      ________________________________________________________________________  Witness signature                                                            ________________________________________________________________________  Provider signature - Salima Lockhart MD      [id number]  Rev 12/18         Registration to scan to EHR                         Page 2 of 2                   Controlled Substance Agreement Opioid           Page 1 of 2  Opioid Pain Medicines (also known as Narcotics)  What You Need to Know    What are opioids?   Opioids  are pain medicines that must be prescribed by a doctor.  They are also known as narcotics.    Examples are:     morphine (MS Contin, Venus)    oxycodone (Oxycontin)    oxycodone and acetaminophen (Percocet)    hydrocodone and acetaminophen (Vicodin, Norco)     fentanyl patch (Duragesic)     hydromorphone (Dilaudid)     methadone     What do opioids do well?   Opioids are best for short-term pain after a surgery or injury. They also work well for cancer pain. Unlike other pain medicines, they do not cause liver or kidney failure or ulcers. They may help some people with long-lasting (chronic) pain.     What do opioids NOT do well?   Opioids never get rid of pain entirely, and they do not work well for most patients with chronic pain. Opioids do not reduce swelling, one of the causes of pain. They also dont work well for nerve pain.                           For informational purposes only.  Not to replace the advice of your care provider.  Copyright [facility]. All right reserved. [employer] 632447-Fuw 02/18.      Page 2 of 2    Risks and side effects   Talk to your doctor before you start or decide to keep taking one of these medicines. Side effects include:    Lowering your breathing rate enough to cause death    Overdose, including death, especially if taking higher than prescribed doses    Long-term opioid use    Worse depression symptoms; less pleasure in things you usually enjoy    Feeling tired or sluggish    Slower thoughts or cloudy thinking    Being more sensitive to pain over time; pain is harder to control    Trouble sleeping or restless sleep    Changes in hormone levels (for example, less testosterone)    Changes in sex drive or ability to have sex    Constipation    Unsafe driving    Itching and sweating    Feeling dizzy    Nausea, vomiting and dry mouth    What else should I know about opioids?  When someone takes opioids for too long or too often, they become dependent. This means  that if you stop or reduce the medicine too quickly, you will have withdrawal symptoms.    Dependence is not the same as addiction. Addiction is when people keep using a substance that harms their body, their mind or their relations with others. If you have a history of drug or alcohol abuse, taking opioids can cause a relapse.    Over time, opioids dont work as well. Most people will need higher and higher doses. The higher the dose, the more serious the side effects. We dont know the long-term effects of opioids.      Prescribed opioids aren't the best way to manage chronic pain    Other ways to manage pain include:      Ibuprofen or acetaminophen.  You should always try this first.      Treat health problems that may be causing pain.      acupuncture or massage, deep breathing, meditation, visual imagery, aromatherapy.      Use heat or ice at the pain site      Physical therapy and exercise      Stop smoking      See a counselor or therapist                                                  People who have used opioids for a long time may have a lower quality of life, worse depression, higher levels of pain and more visits to doctors.    Never share your opioids with others. Be sure to store opioids in a secure place, locked if possible.Young children can easily swallow them and overdose.     You can overdose on opioids.  Signs of overdose include decrease or loss of consciousness, slowed breathing, trouble waking and blue lips.  If someone is worried about overdose, they should call 911.    If you are at risk for overdose, you may get naloxone (Narcan, a medicine that reverses the effects of opioids.  If you overdose, a friend or family member can give you Narcan while waiting for the ambulance.  They need to know the signs of overdose and how to give Narcan.    While you're taking opioids:    Don't use alcohol or street drugs. Taking them together can cause death.    Don't take any of these medicines unless your  doctor says its okay.  Taking these with opioids can cause death.    Benzodiazepines (such as lorazepam         or diazepam)    Muscle relaxers (such as cyclobenzaprine)    sleeping pills    other opioids    Safe disposal of opioids  Find your area drug take-back program, your pharmacy mail-back program, buy a special disposal bag (such as Deterra) from your pharmacy or flush them down the toilet.  Use the guidelines at:  www.fda.gov/drugs/resourcesforyou

## 2021-06-19 NOTE — LETTER
Letter by Salima Lockhart MD at      Author: Salima Lockhart MD Service: -- Author Type: --    Filed:  Encounter Date: 5/20/2019 Status: (Other)         Paulina Scott  [address] [facility] [address]             May 20, 2019         Dear Ms. Scott,    Below are the results from your recent visit:    Resulted Orders   Comprehensive Metabolic Panel   Result Value Ref Range    Sodium 139 136 - 145 mmol/L    Potassium 4.1 3.5 - 5.0 mmol/L    Chloride 105 98 - 107 mmol/L    CO2 23 22 - 31 mmol/L    Anion Gap, Calculation 11 5 - 18 mmol/L    Glucose 94 70 - 125 mg/dL    BUN 13 8 - 22 mg/dL    Creatinine 0.69 0.60 - 1.10 mg/dL    GFR MDRD Af Amer >60 >60 mL/min/1.73m2    GFR MDRD Non Af Amer >60 >60 mL/min/1.73m2    Bilirubin, Total 0.4 0.0 - 1.0 mg/dL    Calcium 9.6 8.5 - 10.5 mg/dL    Protein, Total 6.7 6.0 - 8.0 g/dL    Albumin 4.2 3.5 - 5.0 g/dL    Alkaline Phosphatase 73 45 - 120 U/L    AST 19 0 - 40 U/L    ALT <9 0 - 45 U/L    Narrative    Fasting Glucose reference range is 70-99 mg/dL per  American Diabetes Association (ADA) guidelines.   Lipid [place] FASTING   Result Value Ref Range    Cholesterol 199 <=199 mg/dL    Triglycerides 65 <=149 mg/dL    HDL Cholesterol 82 >=50 mg/dL    LDL Calculated 104 <=129 mg/dL    Patient Fasting > 8hrs? Yes        All the labs are normal/stable.     Please call with questions or contact us using [employer].    Sincerely,        Electronically signed by Salima Lockhart MD

## 2021-06-20 NOTE — LETTER
Letter by Salima Lockhart MD at      Author: Salima Lockhart MD Service: -- Author Type: --    Filed:  Encounter Date: 9/11/2020 Status: (Other)         [facility] MEDICINE/OB  09/11/20    Patient: Paulina Scott  YOB: 1960  Medical Record Number: [id number]  CSN: [id number]                                                                              Non-opioid Controlled Substance Agreement    I understand that my care provider has prescribed a controlled substance to help manage my condition(s). I am taking this medicine to help me function or work. I know this is strong medicine, and that it can cause serious side effects. Controlled substances can be sedating, addicting and may cause a dependency on the drug. They can affect my ability to drive or think, and cause depression. They need to be taken exactly as prescribed. Combining controlled substances with certain medicines or chemicals (such as cocaine, sedatives and tranquilizers, sleeping pills, meth) can be dangerous or even fatal. Also, if I stop controlled substances suddenly, I may have severe withdrawal symptoms.  If not helpful, I may be asked to stop them.    The risks, benefits, and side effects of these medicine(s) were explained to me. I agree that:    1. I will take part in other treatments as advised by my care team. This may be psychiatry or counseling, physical therapy, behavioral therapy, group treatment or a referral to a pain clinic. I will reduce or stop my medicine when my care team tells me to do so.  2. I will take my medicines as prescribed. I will not change the dose or schedule unless my care team tells me to. There will be no refills if I run out early.  I may be contactedwithout warning and asked to complete a urine drug test or pill count at any time.   3. I will keep all my appointments, and understand this is part of the monitoring of controlled substances. My care team may require an office visit for  EVERY controlled substance refill. If I miss appointments or dont follow instructions, my care team may stop my medicine.  4. I will not ask other providers to prescribe controlled substances, and I will not accept controlled substances from other people. If I need another prescribed controlled substance for a new reason, I will tell my care team within 1 business day.  5. I will use one pharmacy to fill all of my controlled substance prescriptions, and it is up to me to make sure that I do not run out of my medicines on weekends or holidays. If my care team is willing to refill my controlled substance prescription without a visit, I must request refills only during office hours, refills may take up to 3 days to process, and it may take up to 5 to 7 days for my medicine to be mailed and ready at my pharmacy. Prescriptions will not be mailed anywhere except my pharmacy.    6. I am responsible for my prescriptions. If the medicine/prescription is lost or stolen, it will not be replaced. I also agree not to share controlled substance medicines with anyone.          [facility] MEDICINE/OB  09/11/20  Patient:  Paulina Scott  YOB: 1960  Medical Record Number: [id number]  CSN: [id number]    7. I agree to not use ANY illegal or recreational drugs. This includes marijuana, cocaine, bath salts or other drugs. I agree not to use alcohol unless my care team says I may. I agree to give urine samples whenever asked. If I dont give a urine sample, the care team may stop my medicine.    8. If I enroll in the [place] Medical Marijuana program, I will tell my care team. I will also sign an agreement to share my medical records with my care team.    9. I will bring in my list of medicines (or my medicine bottles) each time I come to the clinic.   10. I will tell my care team right away if I become pregnant or have a new medical problem treated outside of my regular clinic.  11. I understand that this  medicine can affect my thinking and judgment. It may be unsafe for me to drive, use machinery and do dangerous tasks. I will not do any of these things until I know how the medicine affects me. If my dose changes, I will wait to see how it affects me. I will contact my care team if I have concerns about medicine side effects.    I understand that if I do not follow any of the conditions above, my prescriptions or treatment may be stopped.      I agree that my provider, clinic care team, and pharmacy may work with any city, state or federal law enforcement agency that investigates the misuse, sale, or other diversion of my controlled medicine. I will allow my provider to discuss my care with or share a copy of this agreement with any other treating provider, pharmacy or emergency room where I receive care. I agree to give up (waive) any right of privacy or confidentiality with respect to these consents.   I have read this agreement and have asked questions about anything I did not understand.    ___________________________________________________________________________  Patient signature - Date/Time  -Paulina Scott                                      ___________________________________________________________________________  Witness signature                                                                    ___________________________________________________________________________  Provider signature- Salima Lockhart MD

## 2021-06-21 NOTE — LETTER
"Letter by Salvador Freeman MD at      Author: Salvador Freeman MD Service: -- Author Type: --    Filed:  Encounter Date: 11/18/2020 Status: (Other)         Reagan Dumont MD  [address] [facility] [address]                                  November 20, 2020    Patient: Paulina Scott   MR Number: [id number]   YOB: 1960   Date of Visit: 11/18/2020     Dear Dr. Julia MD:    I have seen Paulina Scott in follow up. Below are the relevant portions of my assessment and plan of care.    If you have questions, please do not hesitate to call me. I look forward to following Paulina along with you.    Sincerely,        Salvador Freeman MD            MD Lydia Dominguez Michael J, MD  11/20/2020  5:21 PM  Sign when Signing Visit  Paulina Scott is a 60 y.o. female who is being evaluated via a billable video visit.      The patient has been notified of following:     \"This video visit will be conducted via a call between you and your physician/provider. We have found that certain health care needs can be provided without the need for an in-person physical exam.  This service lets us provide the care you need with a video conversation.  If a prescription is necessary we can send it directly to your pharmacy.  If lab work is needed we can place an order for that and you can then stop by our lab to have the test done at a later time.    Video visits are billed at different rates depending on your insurance coverage. Please reach out to your insurance provider with any questions.    If during the course of the call the physician/provider feels a video visit is not appropriate, you will not be charged for this service.\"    Patient has given verbal consent to a Video visit? Yes  How would you like to obtain your AVS? AVS Preference: MyChart.  If dropped by the video visit, the video invitation should be sent to: Text to cell phone: [phone]  Will anyone else be joining your video visit? " No    Video Start Time: 9:45 AM    Additional provider notes:   She remains on cefazolin. She is tolerating this well. Has some pain at hand but this is improved. Sutures were removed when she saw Dr. Dumont last week, and she reports that he was pleased with progress. She continues on vancomycin PO as c diff prophylaxis. Denies diarrhea.     Surgery has been scheduled for 12/18/20. She is hoping to have surgery completed prior to end of year and is planning to get back to work mid-January.     Exam:  No distress  Breathing comfortably  PICC in L UE  Right hand with brace in place. 2nd digit ROM remains limited.   No apparent rash    Labs, cultures reviewed.      ASSESSMENT:  1. 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 Underwent I+D with removal of prosthesis 10/22, placement of antibiotic spacer.  Cultures grew methicillin-sensitive staph epidermidis. Prosthetic finger joint infections are not commonly seen mainly due to these joint implantations being rare. We do not have the robust data for treatment of these infections that we do for larger joints, so my treatment plan is extrapolated from the larger joint standard of care. She seems to be doing well. CRP was not elevated pre-op, increased slightly post-op, and has since been normal. Plan at least 2 weeks off antibiotics prior to re-implantation.   2. S/p right 2nd MCP joint arthroplasty 9/18/20  3. Severe osteoarthritis multiple joints. Previous back surgeries, also joint replacement of toe.   4. Sulfa allergy. Tetracycline and erythromycin intolerance.   5. H/o C diff 20 years ago. On prophylactic oral vancomycin while on cefazolin.     PLAN:  Cefazolin 2gm IV q8h, 6 weeks = 12/2. PICC can be removed after last dose.   P.o. vancomycin daily while on cefazolin, then this can be stopped after IV finished.  Weekly labs while on IV antibiotics:  CBC with differential, BMP, C-reactive protein  Plan CRP check 12/16.      Could send biopsy for frozen section at the time of surgery and if minimal inflammation, proceed.     [facility] infusion will be contacted about stop date.    Discussed with Dr. Dumont after the visit. Plan reviewed.     She can follow up with me as needed.     Video-Visit Details    Type of service:  Video Visit    Video End Time (time video stopped): 9:55 AM  Originating Location (pt. Location): Home    Distant Location (provider location):  [facility]     Platform used for Video Visit: Kisha Freeman MD

## 2021-06-22 NOTE — PATIENT INSTRUCTIONS - HE
I am seeing you today for establishing care.    You have chronic pain and has been using opioid medication.    We have strict guidelines for ongoing opioid use.  After discussion with you I am willing to give you 40 pills every month.  You have signed a CSA agreement.      We will try to follow after we receive all your information from [place].    Salima Lockhart MD  1/4/2019

## 2021-06-22 NOTE — PROGRESS NOTES
Assessment:     1. Menopausal and female climacteric states  Ambulatory referral to Gynecology   2. Chronic bilateral low back pain with right-sided sciatica  Drug Abuse 1+, Urine   3. Raynaud's disease without gangrene     4. Chronic constipation  Ambulatory referral to Gastroenterology   5. Pain of right hand     6. Congenital absence of vagina       We will obtain records from previous clinic and [place].  Detailed discussion on chronic narcotic use and current guidelines.  Reduce Vicodin use to 1 tablet at nighttime and occasionally 1 for further pain.     Plan:      The diagnosis was discussed with the patient and evaluation and treatment plans outlined.  See orders for lab and imaging studies.   Patient Instructions   I am seeing you today for establishing care.    You have chronic pain and has been using opioid medication.    We have strict guidelines for ongoing opioid use.  After discussion with you I am willing to give you 40 pills every month.  You have signed a CSA agreement.      We will try to follow after we receive all your information from [place].    Salima Lockhart MD  1/4/2019            Subjective:      Paulina Scott is a  female who presents for evaluation of   Chief Complaint   Patient presents with     Establish Care     relocated from the [facility]     1- Chronic pain: This is from her DJD of the back.  She describes multiple ruptured disc and multiple back surgeries.  She uses hydrocodone 1-2 tablets every day for her pain control.  She is here to establish care and get her pain medication refill.    2- Menopausal: Patient had congenital absence of vagina and uterus.  She has an artificial vagina.  She does have intact ovaries.  She has been using estrogen replacement therapy for her menopausal symptoms from her gynecologist in [place].    3- Insomnia: She describes that she uses gabapentin 100 mg at nighttime to control symptoms of insomnia.    4-  Hand joint Pain: She describes  that she has been getting injections in metacarpophalangeal joint of her index finger.  This is from osteoarthritis.    5- Labs concerning for Lupus: She has already established care with a rheumatologist as she has labs that are positive for lupus although a formal diagnosis has not been made.  She does have a past history of rainouts phenomena.  She informs me that in [place] this was not much of an issue.  After moving to [place] in October, she has taken cares to prevent herself from extreme cold.    6 Hhas had chronic constipation and for the last 4 years has been on Linzess.     The following portions of the patient's history were reviewed and updated as appropriate: allergies, current medications, past family history, past medical history, past social history, past surgical history and problem list.  Allergies   Allergen Reactions     Reglan [Metoclopramide Hcl]      Muscles      Tetracycline      Sulfa (Sulfonamide Antibiotics) Rash       Current Outpatient Medications on File Prior to Visit   Medication Sig Dispense Refill     estradiol (VIVELLE-DOT) 0.1 mg/24 hr APPLY 1 PATCH TWICE WEEKLY  3     gabapentin (NEURONTIN) 100 MG capsule TAKE 1 TO 2 CAPSULES BY MOUTH EVERY EVENING AT BEDTIME AS NEEDED  0     [DISCONTINUED] HYDROcodone-acetaminophen 5-325 mg per tablet TAKE 1 TABLET AS NEEDED EVERY 6 HOURS FOR NON ACUTE PAIN ORALLY 30 DAYS  0     No current facility-administered medications on file prior to visit.        Patient Active Problem List   Diagnosis     Congenital absence of vagina     Chronic bilateral low back pain with right-sided sciatica     Raynaud's disease without gangrene     Chronic constipation     Pain of right hand       Past Medical History:   Diagnosis Date     Chronic bilateral low back pain with right-sided sciatica 1/4/2019     Chronic constipation 1/4/2019     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     Osteoarthritis      Pain of right hand 1/4/2019     Raynaud's disease without  "gangrene 1/4/2019       Past Surgical History:   Procedure Laterality Date     BACK SURGERY       HIP SURGERY         Family History   Problem Relation Age of Onset     Dementia Mother         86     Cancer Mother      Cancer Maternal Grandmother      Cancer Paternal Grandmother        Social History     Socioeconomic History     Marital status:      Spouse name: None     Number of children: None     Years of education: None     Highest education level: None   Social Needs     Financial resource strain: None     Food insecurity - worry: None     Food insecurity - inability: None     Transportation needs - medical: None     Transportation needs - non-medical: None   Occupational History     None   Tobacco Use     Smoking status: Never Smoker     Smokeless tobacco: Never Used   Substance and Sexual Activity     Alcohol use: Yes     Frequency: 2-4 times a month     Drinks per session: 1 or 2     Drug use: No     Sexual activity: Yes     Partners: Male     Birth control/protection: None   Other Topics Concern     None   Social History Narrative     and lives with .  No children.    Works as a .    Family lives in [place].    Moved from [place], [place].    Moved away  from [place] in 1979.        Salima Lockhart MD  1/4/2019           Review of Systems  A 12 point comprehensive review of systems was negative except as noted.       Objective:   Blood pressure 129/67, pulse 72, temperature 97.8  F (36.6  C), temperature source Oral, resp. rate 12, height 5' 4.96\" (1.65 m), weight 126 lb 9.6 oz (57.4 kg).    GENERAL APPEARANCE:  Appearing stated age, smiling, alert, cooperative, and in no acute distress.   HEENT: Pupils equal, regular, react to light and accommodation. Extraocular muscles intact, fundi benign. Ear canals and tympanic membranes are normal. Lips, mouth, and throat are unremarkable.   NECK: Neck supple without adenopathy, thyromegaly or masses.   LYMPH: No anterior cervical or " supraclavicular LN enlargement   PULMONARY: Normal respiratory effort. Chest is clear.   CARDIOVASCULAR: Heart auscultation: rhythm regular, heart sounds normal S1 and S2, bruit carotid artery absent.   SKIN: Warm and well perfused..   ABDOMEN: Abdomen soft, non-tender. BS normal. No masses or organomegaly.   EXTREMITIES: Peripheral pulses are full. Extremities with no edema.  Noted swelling of the metacarpophalangeal joint right index finger.   MENTAL STATUS: Alert, oriented and thought content appropriate   NEUROLOGIC: Station and gait normal, strength and movement normal,

## 2021-06-23 NOTE — PROGRESS NOTES
Reviewed mammogram reports from [place] this was done at [facility], phone number [phone], [facility].    The dictation of mammogram shows-  Impression: Short-term interval follow-up right mammogram and ultrasound demonstrated interval stability of dense fibrocystic pattern.  Recommend follow-up bilateral diagnostic mammogram and ultrasound due in April 2019.  If stable, the patient will be placed in yearly schedule

## 2021-06-24 NOTE — TELEPHONE ENCOUNTER
Reason contacted:  Return call  Information relayed:  Spoke to patient and she states Dr. Diop from his office in [place]. Please review office notes under media tab 09/21/18.  Additional questions:  No  Further follow-up needed:  No

## 2021-06-24 NOTE — TELEPHONE ENCOUNTER
Please call patient and ask with the last refill was from.  We can obtain records and refill following the plan of care.  Salima Lockhart MD  2/18/2019

## 2021-06-24 NOTE — TELEPHONE ENCOUNTER
Controlled Substance Refill Request  Medication Name:   Requested Prescriptions     Pending Prescriptions Disp Refills     HYDROcodone-acetaminophen 5-325 mg per tablet 40 tablet 0     Sig: Take 1 tablet by mouth at bedtime as needed for pain.     Date Last Fill: 1/4/19  Pharmacy: [facility]  Note new pharmacy  Do not sent to [facility] please.        Submit electronically to pharmacy  Controlled Substance Agreement Date Scanned:   Encounter-Level CSA Scan Date:    There are no encounter-level csa scan date.       Last office visit with prescriber/PCP: 1/4/2019 Salima Lockhart MD OR same dept: 1/4/2019 Salima Lockhart MD OR same specialty: 1/4/2019 Salima Lockhart MD  Last physical: Visit date not found Last MTM visit: Visit date not found

## 2021-06-24 NOTE — TELEPHONE ENCOUNTER
Reason contacted:  Medication Refills  Information relayed:  Informed patient refills have been sent to pharmacy. Pt voiced understanding.  Additional questions:  No  Further follow-up needed:  No

## 2021-06-24 NOTE — TELEPHONE ENCOUNTER
Medication Request  Medication name: escitalopram 20 mg daily #90 One tab daily po  Pharmacy Name and Location: [facility]  Reason for request: needs refill  When did you use medication last?:  today  Okay to leave a detailed message: yes

## 2021-06-24 NOTE — PROGRESS NOTES
2/12/19 DARLINE for Pt informing her of Dr Lockhart's message and recommendations.  TOLU MortonA

## 2021-06-25 ENCOUNTER — COMMUNICATION - HEALTHEAST (OUTPATIENT)
Dept: FAMILY MEDICINE | Facility: CLINIC | Age: 61
End: 2021-06-25

## 2021-06-25 DIAGNOSIS — M00.9 PYOGENIC ARTHRITIS OF RIGHT HAND, DUE TO UNSPECIFIED ORGANISM (H): ICD-10-CM

## 2021-06-25 NOTE — TELEPHONE ENCOUNTER
Medication:   HYDROcodone-acetaminophen 5-325 mg per tablet 30 tablet         Last Date Filled Per epic, 04/29/21     pulled: NO      Only PCP Prescribing?: Unknown    Taken as prescribed from physician notes?: YES  Patient takes 1 a day of hydrocodone that she can continue as before.  She was due for a controlled substance agreement and a drug urine screen which was obtained today.     CSA in last year: YES    Random Utox in last year: YES    Opioids + benzodiazepines? NO

## 2021-06-25 NOTE — TELEPHONE ENCOUNTER
Reason for Call:  Medication or medication refill:    Do you use a [facility]?  Name of the pharmacy and phone number for the current request: [facility]/PH[facility]ACY #[id number] - [facility], [place] - [address]    Name of the medication requested: HYDROcodone-acetaminophen 5-325 mg per tablet    Other request: NA    Can we leave a detailed message on this number? Yes    Phone number patient can be reached at:   Cell number on file:    Telephone Information:   Mobile [phone]       Best Time: Any time    Call taken on 5/27/2021 at 10:09 AM by Ronaldo Navas

## 2021-06-25 NOTE — TELEPHONE ENCOUNTER
Refill Approved    Rx renewed per Medication Renewal Policy. Medication was last renewed on 7/30/20.    Rodriguez Smith, [facility] Triage/Med Refill 6/2/2021     Requested Prescriptions   Pending Prescriptions Disp Refills     escitalopram oxalate (LEXAPRO) 20 MG tablet 90 tablet 2     Sig: Take 1 tablet (20 mg total) by mouth daily.       SSRI Refill Protocol  Passed - 6/1/2021  2:14 PM        Passed - PCP or prescribing provider visit in last year     Last office visit with prescriber/PCP: 3/15/2021 Salima Lockhart MD OR same dept: 3/15/2021 Salima Lockhart MD OR same specialty: 3/15/2021 Salima Lockhart MD  Last physical: 12/11/2020 Last MTM visit: Visit date not found   Next visit within 3 mo: Visit date not found  Next physical within 3 mo: Visit date not found  Prescriber OR PCP: Salima Lockhart MD  Last diagnosis associated with med order: 1. Depression  - escitalopram oxalate (LEXAPRO) 20 MG tablet; Take 1 tablet (20 mg total) by mouth daily.  Dispense: 90 tablet; Refill: 2    If protocol passes may refill for 12 months if within 3 months of last provider visit (or a total of 15 months).

## 2021-06-26 ENCOUNTER — HEALTH MAINTENANCE LETTER (OUTPATIENT)
Age: 61
End: 2021-06-26

## 2021-06-26 NOTE — TELEPHONE ENCOUNTER
Reason for Call:  Medication refill    Do you use a [facility]?  Name of the pharmacy and phone number for the current request: [facility]/PHARMACY #[id number] - [facility], [place] - [address]    Name of the medication requested: HYDROcodone-acetaminophen 5-325 mg per tablet    Other request: NA    Can we leave a detailed message on this number? Yes    Phone number patient can be reached at: Cell number on file:    Telephone Information:   Mobile [phone]       Best Time: Any time    Call taken on 6/25/2021 at 8:41 AM by Ronaldo Navas

## 2021-06-28 ENCOUNTER — RECORDS - HEALTHEAST (OUTPATIENT)
Dept: ADMINISTRATIVE | Facility: OTHER | Age: 61
End: 2021-06-28

## 2021-06-28 RX ORDER — HYDROCODONE BITARTRATE AND ACETAMINOPHEN 5; 325 MG/1; MG/1
1 TABLET ORAL EVERY 6 HOURS PRN
Qty: 30 TABLET | Refills: 0 | Status: SHIPPED | OUTPATIENT
Start: 2021-06-28 | End: 2021-08-16

## 2021-07-03 NOTE — ANESTHESIA PREPROCEDURE EVALUATION
Anesthesia Preprocedure Evaluation by Sarah Morse MD at 10/22/2020  6:34 PM     Author: Sarah Morse MD Service: -- Author Type: Physician    Filed: 10/22/2020  7:19 PM Date of Service: 10/22/2020  6:34 PM Status: Signed    : Sarah Morse MD (Physician)       Anesthesia Evaluation      Patient summary reviewed   History of anesthetic complications (PONV)     Airway   Mallampati: II  Neck ROM: full   Pulmonary - normal exam                          Cardiovascular   Exercise tolerance: > or = 4 METS  Rhythm: regular  Rate: normal,         Neuro/Psych    (+) chronic pain    Endo/Other    (+) arthritis,      GI/Hepatic/Renal       Other findings: Lab Results       Component                Value               Date                       WBC                      5.0                 10/22/2020                 HGB                      12.7                10/22/2020                 HCT                      36.5                10/22/2020                 MCV                      92                  10/22/2020                 PLT                      256                 10/22/2020             Thin, fit, INAD  Right proximal first finger joint is red, swollen, with obvious pus      Dental                             Anesthesia Plan  Planned anesthetic: peripheral nerve block and general mask  Septic joint making emergent status necessary  TIVA, GA mask, Axillary PNB for post-operative analgesia    Nerve block procedure and its risks, including, but not limited to, persistent post operative paresthesias or pain, nerve damage, bleeding, infection, seizure, cardiorespiratory arrest, and block failure were discussed fully with patient. The spinal block procedure and its risks, including, but not limited to headache, nerve damage, bleeding, infection, back pain, low blood pressure, cardiorespiratory arrest, and block failure were also discussed fully with patient.   Opportunity for questions given.    Informed consent obtained for all procedures. Desires to proceed.   ASA 2 - emergent   Induction: intravenous   Anesthetic plan and risks discussed with: patient and spouse  Anesthesia plan special considerations: antiemetics,   Post-op plan: routine recovery

## 2021-07-28 DIAGNOSIS — M00.9 PYOGENIC ARTHRITIS OF RIGHT HAND, DUE TO UNSPECIFIED ORGANISM (H): ICD-10-CM

## 2021-07-28 RX ORDER — HYDROCODONE BITARTRATE AND ACETAMINOPHEN 5; 325 MG/1; MG/1
1 TABLET ORAL EVERY 6 HOURS PRN
Qty: 30 TABLET | Refills: 0 | Status: SHIPPED | OUTPATIENT
Start: 2021-07-28 | End: 2021-08-24

## 2021-08-16 ENCOUNTER — OFFICE VISIT (OUTPATIENT)
Dept: FAMILY MEDICINE | Facility: CLINIC | Age: 61
End: 2021-08-16
Payer: COMMERCIAL

## 2021-08-16 VITALS
DIASTOLIC BLOOD PRESSURE: 64 MMHG | BODY MASS INDEX: 21.99 KG/M2 | WEIGHT: 132 LBS | HEIGHT: 65 IN | TEMPERATURE: 97.4 F | SYSTOLIC BLOOD PRESSURE: 116 MMHG | HEART RATE: 64 BPM

## 2021-08-16 DIAGNOSIS — Z79.899 CONTROLLED SUBSTANCE AGREEMENT SIGNED: ICD-10-CM

## 2021-08-16 DIAGNOSIS — Z00.00 ANNUAL PHYSICAL EXAM: Primary | ICD-10-CM

## 2021-08-16 DIAGNOSIS — Z79.890 ON HORMONE REPLACEMENT THERAPY: ICD-10-CM

## 2021-08-16 DIAGNOSIS — R92.8 ABNORMAL MAMMOGRAM: ICD-10-CM

## 2021-08-16 PROBLEM — M54.9 BACKACHE: Status: ACTIVE | Noted: 2021-08-16

## 2021-08-16 PROBLEM — M81.0 OSTEOPOROSIS: Status: ACTIVE | Noted: 2021-08-16

## 2021-08-16 LAB
ALBUMIN SERPL-MCNC: 4.4 G/DL (ref 3.5–5)
ALP SERPL-CCNC: 81 U/L (ref 45–120)
ALT SERPL W P-5'-P-CCNC: 11 U/L (ref 0–45)
AMPHETAMINES UR QL SCN: NORMAL
ANION GAP SERPL CALCULATED.3IONS-SCNC: 12 MMOL/L (ref 5–18)
AST SERPL W P-5'-P-CCNC: 24 U/L (ref 0–40)
BARBITURATES UR QL: NORMAL
BENZODIAZ UR QL: NORMAL
BILIRUB SERPL-MCNC: 0.5 MG/DL (ref 0–1)
BUN SERPL-MCNC: 10 MG/DL (ref 8–22)
CALCIUM SERPL-MCNC: 9.9 MG/DL (ref 8.5–10.5)
CANNABINOIDS UR QL SCN: NORMAL
CHLORIDE BLD-SCNC: 103 MMOL/L (ref 98–107)
CHOLEST SERPL-MCNC: 231 MG/DL
CO2 SERPL-SCNC: 25 MMOL/L (ref 22–31)
COCAINE UR QL: NORMAL
CREAT SERPL-MCNC: 0.75 MG/DL (ref 0.6–1.1)
CREAT UR-MCNC: 11 MG/DL
ERYTHROCYTE [DISTWIDTH] IN BLOOD BY AUTOMATED COUNT: 11.8 % (ref 10–15)
FASTING STATUS PATIENT QL REPORTED: YES
GFR SERPL CREATININE-BSD FRML MDRD: 86 ML/MIN/1.73M2
GLUCOSE BLD-MCNC: 118 MG/DL (ref 70–125)
HCT VFR BLD AUTO: 39.2 % (ref 35–47)
HDLC SERPL-MCNC: 85 MG/DL
HGB BLD-MCNC: 13.5 G/DL (ref 11.7–15.7)
HIV 1+2 AB+HIV1 P24 AG SERPL QL IA: NEGATIVE
LDLC SERPL CALC-MCNC: 132 MG/DL
MCH RBC QN AUTO: 31.8 PG (ref 26.5–33)
MCHC RBC AUTO-ENTMCNC: 34.4 G/DL (ref 31.5–36.5)
MCV RBC AUTO: 93 FL (ref 78–100)
OPIATES UR QL SCN: NORMAL
OXYCODONE UR QL: NORMAL
PCP UR QL SCN: NORMAL
PLATELET # BLD AUTO: 274 10E3/UL (ref 150–450)
POTASSIUM BLD-SCNC: 5.2 MMOL/L (ref 3.5–5)
PROT SERPL-MCNC: 7.2 G/DL (ref 6–8)
RBC # BLD AUTO: 4.24 10E6/UL (ref 3.8–5.2)
SODIUM SERPL-SCNC: 140 MMOL/L (ref 136–145)
TRIGL SERPL-MCNC: 68 MG/DL
WBC # BLD AUTO: 5 10E3/UL (ref 4–11)

## 2021-08-16 PROCEDURE — 80061 LIPID PANEL: CPT | Performed by: FAMILY MEDICINE

## 2021-08-16 PROCEDURE — 87389 HIV-1 AG W/HIV-1&-2 AB AG IA: CPT | Performed by: FAMILY MEDICINE

## 2021-08-16 PROCEDURE — 80053 COMPREHEN METABOLIC PANEL: CPT | Performed by: FAMILY MEDICINE

## 2021-08-16 PROCEDURE — 86803 HEPATITIS C AB TEST: CPT | Performed by: FAMILY MEDICINE

## 2021-08-16 PROCEDURE — 80307 DRUG TEST PRSMV CHEM ANLYZR: CPT | Performed by: FAMILY MEDICINE

## 2021-08-16 PROCEDURE — 36415 COLL VENOUS BLD VENIPUNCTURE: CPT | Performed by: FAMILY MEDICINE

## 2021-08-16 PROCEDURE — 99213 OFFICE O/P EST LOW 20 MIN: CPT | Mod: 25 | Performed by: FAMILY MEDICINE

## 2021-08-16 PROCEDURE — 85027 COMPLETE CBC AUTOMATED: CPT | Performed by: FAMILY MEDICINE

## 2021-08-16 PROCEDURE — 99396 PREV VISIT EST AGE 40-64: CPT | Performed by: FAMILY MEDICINE

## 2021-08-16 RX ORDER — VANCOMYCIN HYDROCHLORIDE 125 MG/1
CAPSULE ORAL
COMMUNITY
Start: 2020-11-25 | End: 2021-08-16

## 2021-08-16 RX ORDER — KETOROLAC TROMETHAMINE 10 MG/1
TABLET, FILM COATED ORAL
COMMUNITY
Start: 2020-12-18 | End: 2021-08-16

## 2021-08-16 RX ORDER — GABAPENTIN 100 MG/1
300 CAPSULE ORAL AT BEDTIME
Qty: 270 CAPSULE | Refills: 3 | Status: SHIPPED | OUTPATIENT
Start: 2021-08-16 | End: 2022-09-06

## 2021-08-16 RX ORDER — ESTRADIOL 10 UG/1
INSERT VAGINAL
COMMUNITY
End: 2021-08-16

## 2021-08-16 ASSESSMENT — ANXIETY QUESTIONNAIRES
6. BECOMING EASILY ANNOYED OR IRRITABLE: NOT AT ALL
5. BEING SO RESTLESS THAT IT IS HARD TO SIT STILL: NOT AT ALL
GAD7 TOTAL SCORE: 0
IF YOU CHECKED OFF ANY PROBLEMS ON THIS QUESTIONNAIRE, HOW DIFFICULT HAVE THESE PROBLEMS MADE IT FOR YOU TO DO YOUR WORK, TAKE CARE OF THINGS AT HOME, OR GET ALONG WITH OTHER PEOPLE: NOT DIFFICULT AT ALL
3. WORRYING TOO MUCH ABOUT DIFFERENT THINGS: NOT AT ALL
4. TROUBLE RELAXING: NOT AT ALL
7. FEELING AFRAID AS IF SOMETHING AWFUL MIGHT HAPPEN: NOT AT ALL
1. FEELING NERVOUS, ANXIOUS, OR ON EDGE: NOT AT ALL
2. NOT BEING ABLE TO STOP OR CONTROL WORRYING: NOT AT ALL

## 2021-08-16 ASSESSMENT — PATIENT HEALTH QUESTIONNAIRE - PHQ9: SUM OF ALL RESPONSES TO PHQ QUESTIONS 1-9: 0

## 2021-08-16 ASSESSMENT — MIFFLIN-ST. JEOR: SCORE: 1159.63

## 2021-08-16 NOTE — LETTER
Opioid / Opioid Plus Controlled Substance Agreement    This is an agreement between you and your provider about the safe and appropriate use of controlled substance/opioids prescribed by your care team. Controlled substances are medicines that can cause physical and mental dependence (abuse).    There are strict laws about having and using these medicines. We here at [facility] are committing to working with you in your efforts to get better. To support you in this work, we ll help you schedule regular office appointments for medicine refills. If we must cancel or change your appointment for any reason, we ll make sure you have enough medicine to last until your next appointment.     As a Provider, I will:    Listen carefully to your concerns and treat you with respect.     Recommend a treatment plan that I believe is in your best interest. This plan may involve therapies other than opioid pain medication.     Talk with you often about the possible benefits, and the risk of harm of any medicine that we prescribe for you.     Provide a plan on how to taper (discontinue or go off) using this medicine if the decision is made to stop its use.    As a Patient, I understand that opioid(s):     Are a controlled substance prescribed by my care team to help me function or work and manage my condition(s).     Are strong medicines and can cause serious side effects such as:    Drowsiness, which can seriously affect my driving ability    A lower breathing rate, enough to cause death    Harm to my thinking ability     Depression     Abuse of and addiction to this medicine    Need to be taken exactly as prescribed. Combining opioids with certain medicines or chemicals (such as illegal drugs, sedatives, sleeping pills, and benzodiazepines) can be dangerous or even fatal. If I stop opioids suddenly, I may have severe withdrawal symptoms.    Do not work for all types of pain nor for all patients. If they re not helpful, I may  be asked to stop them.    {Benzo / Stimulant (Optional):[id number]}    The risks, benefits and side effects of these medicine(s) were explained to me. I agree that:  1. I will take part in other treatments as advised by my care team. This may be psychiatry or counseling, physical therapy, behavioral therapy, group treatment or a referral to a specialist.     2. I will keep all my appointments. I understand that this is part of the monitoring of opioids. My care team may require an office visit for EVERY opioid/controlled substance refill. If I miss appointments or don t follow instructions, my care team may stop my medicine.    3. I will take my medicines as prescribed. I will not change the dose or schedule unless my care team tells me to. There will be no refills if I run out early.     4. I may be asked to come to the clinic and complete a urine drug test or complete a pill count at any time. If I don t give a urine sample or participate in a pill count, the care team may stop my medicine.    5. I will only receive prescriptions from this clinic for chronic pain. If I am treated by another provider for acute pain issues, I will tell them that I am taking opioid pain medication for chronic pain and that I have a treatment agreement with this provider. I will inform my [facility] care team within one business day if I am given a prescription for any pain medication by another healthcare provider. My [facility] care team can contact other providers and pharmacists about my use of any medicines.    6. It is up to me to make sure that I don t run out of my medicines on weekends or holidays. If my care team is willing to refill my opioid prescription without a visit, I must request refills only during office hours. Refills may take up to 3 business days to process. I will use one pharmacy to fill all my opioid and other controlled substance prescriptions. I will notify the clinic about any changes to my  insurance or medication availability.    7. I am responsible for my prescriptions. If the medicine/prescription is lost, stolen or destroyed, it will not be replaced. I also agree not to share controlled substance medicines with anyone.    8. I am aware I should not use any illegal or recreational drugs. I agree not to drink alcohol unless my care team says I can.       9. If I enroll in the [place] [facility] Cannabis program, I will tell my care team prior to my next refill.     10. I will tell my care team right away if I become pregnant, have a new medical problem treated outside of my regular clinic, or have a change in my medications.    11. I understand that this medicine can affect my thinking, judgment and reaction time. Alcohol and drugs affect the brain and body, which can affect the safety of my driving. Being under the influence of alcohol or drugs can affect my decision-making, behaviors, personal safety, and the safety of others. Driving while impaired (DWI) can occur if a person is driving, operating, or in physical control of a car, motorcycle, boat, snowmobile, ATV, motorbike, off-road vehicle, or any other motor vehicle ([place] Statute 169A.20). I understand the risk if I choose to drive or operate any vehicle or machinery.    I understand that if I do not follow any of the conditions above, my prescriptions or treatment may be stopped or changed.          Opioids  What You Need to Know    What are opioids?   Opioids are pain medicines that must be prescribed by a doctor. They are also known as narcotics.     Examples are:   1. morphine (MS Contin, Venus)  2. oxycodone (Oxycontin)  3. oxycodone and acetaminophen (Percocet)  4. hydrocodone and acetaminophen (Vicodin, Norco)   5. fentanyl patch (Duragesic)   6. hydromorphone (Dilaudid)   7. methadone  8. codeine (Tylenol #3)     What do opioids do well?   Opioids are best for severe short-term pain such as after a surgery or injury. They may work well  for cancer pain. They may help some people with long-lasting (chronic) pain.     What do opioids NOT do well?   Opioids never get rid of pain entirely, and they don t work well for most patients with chronic pain. Opioids don t reduce swelling, one of the causes of pain.                                    Other ways to manage chronic pain and improve function include:       Treat the health problem that may be causing pain    Anti-inflammation medicines, which reduce swelling and tenderness, such as ibuprofen (Advil, Motrin) or naproxen (Aleve)    Acetaminophen (Tylenol)    Antidepressants and anti-seizure medicines, especially for nerve pain    Topical treatments such as patches or creams    Injections or nerve blocks    Chiropractic or osteopathic treatment    Acupuncture, massage, deep breathing, meditation, visual imagery, aromatherapy    Use heat or ice at the pain site    Physical therapy     Exercise    Stop smoking    Take part in therapy       Risks and side effects     Talk to your doctor before you start or decide to keep taking opioids. Possible side effects include:      Lowering your breathing rate enough to cause death    Overdose, including death, especially if taking higher than prescribed doses    Worse depression symptoms; less pleasure in things you usually enjoy    Feeling tired or sluggish    Slower thoughts or cloudy thinking    Being more sensitive to pain over time; pain is harder to control    Trouble sleeping or restless sleep    Changes in hormone levels (for example, less testosterone)    Changes in sex drive or ability to have sex    Constipation    Unsafe driving    Itching and sweating    Dizziness    Nausea, throwing up and dry mouth    What else should I know about opioids?    Opioids may lead to dependence, tolerance, or addiction.      Dependence means that if you stop or reduce the medicine too quickly, you will have withdrawal symptoms. These include loose poop (diarrhea),  jitters, flu-like symptoms, nervousness and tremors. Dependence is not the same as addiction.                       Tolerance means needing higher doses over time to get the same effect. This may increase the chance of serious side effects.      Addiction is when people improperly use a substance that harms their body, their mind or their relations with others. Use of opiates can cause a relapse of addiction if you have a history of drug or alcohol abuse.      People who have used opioids for a long time may have a lower quality of life, worse depression, higher levels of pain and more visits to doctors.    You can overdose on opioids. Take these steps to lower your risk of overdose:    1. Recognize the signs:  Signs of overdose include decrease or loss of consciousness (blackout), slowed breathing, trouble waking up and blue lips. If someone is worried about overdose, they should call 911.    2. Talk to your doctor about Narcan (naloxone).   If you are at risk for overdose, you may be given a prescription for Narcan. This medicine very quickly reverses the effects of opioids.   If you overdose, a friend or family member can give you Narcan while waiting for the ambulance. They need to know the signs of overdose and how to give Narcan.     3. Don't use alcohol or street drugs.   Taking them with opioids can cause death.    4. Do not take any of these medicines unless your doctor says it s OK. Taking these with opioids can cause death:    Benzodiazepines, such as lorazepam (Ativan), alprazolam (Xanax) or diazepam (Valium)    Muscle relaxers, such as cyclobenzaprine (Flexeril)    Sleeping pills like zolpidem (Ambien)     Other opioids      How to keep you and other people safe while taking opioids:    1. Never share your opioids with others.  Opioid medicines are regulated by the Drug Enforcement Agency (JADEN). Selling or sharing medications is a criminal act.    2. Be sure to store opioids in a secure place, locked up  if possible. Young children can easily swallow them and overdose.    3. When you are traveling with your medicines, keep them in the original bottles. If you use a pill box, be sure you also carry a copy of your medicine list from your clinic or pharmacy.    4. Safe disposal of opioids    Most pharmacies have places to get rid of medicine, called disposal kiosks. Medicine disposal options are also available in every [facility]. Search your county and  medication disposal  to find more options. You can find more details at:  https://[email or web address].[email or web address]-green/managing-[email or web address]-medications     I agree that my provider, clinic care team, and pharmacy may work with any city, state or federal law enforcement agency that investigates the misuse, sale, or other diversion of my controlled medicine. I will allow my provider to discuss my care with, or share a copy of, this agreement with any other treating provider, pharmacy or emergency room where I receive care.    I have read this agreement and have asked questions about anything I did not understand.    _______________________________________________________  Patient Signature - Paulina Scott _____________________                   Date     _______________________________________________________  Provider Signature - Salima Lockhart MD   _____________________                   Date     _______________________________________________________  Witness Signature (required if provider not present while patient signing)   _____________________                   Date

## 2021-08-16 NOTE — PROGRESS NOTES
ASSESSMENT/PLAN:       ICD-10-CM    1. Annual physical exam  Z00.00 *MA Screening Digital Bilateral     Adult Gastro Ref - Procedure Only     Lipid panel     Comprehensive metabolic panel (BMP + Alb, Alk Phos, ALT, AST, Total. Bili, TP)     CBC with platelets     gabapentin (NEURONTIN) 100 MG capsule     HIV Antigen Antibody Combo     Hepatitis C antibody     Lipid panel     Comprehensive metabolic panel (BMP + Alb, Alk Phos, ALT, AST, Total. Bili, TP)     CBC with platelets     HIV Antigen Antibody Combo     Hepatitis C antibody   2. Controlled substance agreement signed  Z79.899 Urine Drugs of Abuse Screen Panel 1 - Drug Screen (Full)     Urine Drugs of Abuse Screen Panel 1 - Drug Screen (Full)   3. Abnormal mammogram  R92.8 MA Diagnostic Digital Right   4. On hormone replacement therapy  Z79.890    Medical decision making: Paulina  is here today for annual exam.  She uses a limited Vicodin for her pains and has a controlled substance agreement.  This is about 30 pills a month.  Urine drug screen will be obtained today and a CSA is being signed.    Reviewed with patient that she has had an abnormal mammogram in 2019 with recommended follow-up for diagnostic mammogram needed for right breast lump.  Patient does not remember if she was ever called back for a reminder.  This has been ordered for now.    Patient is on hormone therapy from her OB/GYN and she follows regularly.  We discussed DEXA scan and she defers today.  Advised for Shingrix and patient will consider it after checking with her insurance.    Patient has been advised of split billing requirements and indicates understanding: Yes  COUNSELING:  Reviewed preventive health counseling, as reflected in patient instructions       Regular exercise       Vision screening       Hearing screening       Colon cancer screening       Consider Hep C screening for all patients one time for ages 18-79 years       HIV screeninx in teen years, 1x in adult years, and  "at intervals if high risk    Estimated body mass index is 21.97 kg/m  as calculated from the following:    Height as of this encounter: 1.651 m (5' 5\").    Weight as of this encounter: 59.9 kg (132 lb).        She reports that she has never smoked. She has never used smokeless tobacco.      Counseling Resources:  ATP IV Guidelines  Pooled Cohorts Equation Calculator  Breast Cancer Risk Calculator  BRCA-Related Cancer Risk Assessment: FHS-7 Tool  FRAX Risk Assessment  ICSI Preventive Guidelines  Dietary Guidelines for Americans, 2010  [employer]'s MyPlate  ASA Prophylaxis  Lung CA Screening    Salima Lockhart MD  [facility]     SUBJECTIVE:   CC: Paulina Scott is an 61 year old woman who presents for preventive health visit.   Chief Complaint   Patient presents with     Physical     not fasting       Patient has been advised of split billing requirements and indicates understanding: Yes  Healthy Habits:     Getting at least 3 servings of Calcium per day:  NO    Bi-annual eye exam:  Yes    Dental care twice a year:  Yes    Sleep apnea or symptoms of sleep apnea:  None    Diet:  Regular (no restrictions)    Frequency of exercise:  2-3 days/week    Duration of exercise:  15-30 minutes    Taking medications regularly:  Yes    Medication side effects:  None    PHQ-2 Total Score: 0    Additional concerns today:  No      Today's PHQ-2 Score:   PHQ-2 ( 1999 Pfizer) 8/16/2021   Q1: Little interest or pleasure in doing things 0   Q2: Feeling down, depressed or hopeless 0   PHQ-2 Score 0   Q1: Little interest or pleasure in doing things Not at all   Q2: Feeling down, depressed or hopeless Not at all   PHQ-2 Score 0       Abuse: Current or Past (Physical, Sexual or Emotional) - No  Do you feel safe in your environment? Yes    Have you ever done Advance Care Planning? (For example, a Health Directive, POLST, or a discussion with a medical provider or your loved ones about your wishes): Yes, patient " [facility] has an Advance Care Planning document and will bring a copy to the clinic.    Social History     Tobacco Use     Smoking status: Never Smoker     Smokeless tobacco: Never Used   Substance Use Topics     Alcohol use: Yes     If you drink alcohol do you typically have >3 drinks per day or >7 drinks per week? No    Alcohol Use 8/16/2021   Prescreen: >3 drinks/day or >7 drinks/week? No   Prescreen: >3 drinks/day or >7 drinks/week? -     Reviewed orders with patient.  Reviewed health maintenance and updated orders accordingly - Yes  BP Readings from Last 3 Encounters:   08/16/21 116/64   03/15/21 127/78   12/11/20 130/76    Wt Readings from Last 3 Encounters:   08/16/21 59.9 kg (132 lb)   03/15/21 59.4 kg (131 lb)   12/11/20 58.5 kg (129 lb)                    Breast Cancer Screening:  Any new diagnosis of family breast, ovarian, or bowel cancer? No    FHS-7: No flowsheet data found.    ABNORMAL   Pertinent mammograms are reviewed under the imaging tab.    History of abnormal Pap smear:   Last 3 Pap and HPV Results:   PAP / HPV 4/3/2018 6/14/2016   HPV See Scanned Report See Scanned Report     PAP / HPV 4/3/2018 6/14/2016   HPV See Scanned Report See Scanned Report     Reviewed and updated as needed this visit by clinical staff  Tobacco  Allergies  Meds  Problems  Med Hx  Surg Hx  Fam Hx          Reviewed and updated as needed this visit by Provider  Tobacco  Allergies  Meds  Problems  Med Hx  Surg Hx  Fam Hx         Past Medical History:   Diagnosis Date     Chronic bilateral low back pain with right-sided sciatica 1/4/2019     Chronic constipation 1/4/2019     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     Osteoarthritis      Pain of right hand 1/4/2019     Raynaud's disease without gangrene 1/4/2019      Past Surgical History:   Procedure Laterality Date     BACK SURGERY       HIP SURGERY       PICC  10/25/2020          REMOVE HARDWARE LOWER EXTREMITY  10/22/2020    Procedure: WITH REMOVAL OF  "METACARPOPHALANGEAL JOINT REPLACEMENT, PLACEMENT OF ANTIBIOTIC SPACER;  Surgeon: Reagan Dumont MD;  Location: [facility];  Service: Orthopedics       Review of Systems  CONSTITUTIONAL: NEGATIVE for fever, chills, change in weight  INTEGUMENTARY/SKIN: NEGATIVE for worrisome rashes, moles or lesions  EYES: NEGATIVE for vision changes or irritation  ENT: NEGATIVE for ear, mouth and throat problems  RESP: NEGATIVE for significant cough or SOB  CV: NEGATIVE for chest pain, palpitations or peripheral edema  GI: NEGATIVE for nausea, abdominal pain, heartburn, or change in bowel habits  NEURO: NEGATIVE for weakness, dizziness or paresthesias  PSYCHIATRIC: NEGATIVE for changes in mood or affect      OBJECTIVE:   /64   Pulse 64   Temp 97.4  F (36.3  C) (Oral)   Ht 1.651 m (5' 5\")   Wt 59.9 kg (132 lb)   BMI 21.97 kg/m    Physical Exam  GENERAL: Healthy, alert and no distress  EYES: Eyes grossly normal to inspection.  No discharge or erythema, or obvious scleral/conjunctival abnormalities.  RESP: No audible wheeze, cough, or visible cyanosis.  No visible retractions or increased work of breathing.    SKIN: Visible skin clear. No significant rash, abnormal pigmentation or lesions.  NEURO: Cranial nerves grossly intact.  Mentation and speech appropriate for age.  PSYCH: Mentation appears normal, affect normal/bright, judgement and insight intact, normal speech and appearance well-groomed.    Recent Results (from the past 168 hour(s))   Lipid panel    Collection Time: 08/16/21 11:41 AM   Result Value Ref Range    Cholesterol 231 (H) <=199 mg/dL    Triglycerides 68 <=149 mg/dL    Direct Measure HDL 85 >=50 mg/dL    LDL Cholesterol Calculated 132 (H) <=129 mg/dL    Patient Fasting > 8hrs? Yes    Comprehensive metabolic panel (BMP + Alb, Alk Phos, ALT, AST, Total. Bili, TP)    Collection Time: 08/16/21 11:41 AM   Result Value Ref Range    Sodium 140 136 - 145 mmol/L    Potassium 5.2 (H) 3.5 - 5.0 mmol/L    " Chloride 103 98 - 107 mmol/L    Carbon Dioxide (CO2) 25 22 - 31 mmol/L    Anion Gap 12 5 - 18 mmol/L    Urea Nitrogen 10 8 - 22 mg/dL    Creatinine 0.75 0.60 - 1.10 mg/dL    Calcium 9.9 8.5 - 10.5 mg/dL    Glucose 118 70 - 125 mg/dL    Alkaline Phosphatase 81 45 - 120 U/L    AST 24 0 - 40 U/L    ALT 11 0 - 45 U/L    Protein Total 7.2 6.0 - 8.0 g/dL    Albumin 4.4 3.5 - 5.0 g/dL    Bilirubin Total 0.5 0.0 - 1.0 mg/dL    GFR Estimate 86 >60 mL/min/1.73m2   CBC with platelets    Collection Time: 08/16/21 11:41 AM   Result Value Ref Range    WBC Count 5.0 4.0 - 11.0 10e3/uL    RBC Count 4.24 3.80 - 5.20 10e6/uL    Hemoglobin 13.5 11.7 - 15.7 g/dL    Hematocrit 39.2 35.0 - 47.0 %    MCV 93 78 - 100 fL    MCH 31.8 26.5 - 33.0 pg    MCHC 34.4 31.5 - 36.5 g/dL    RDW 11.8 10.0 - 15.0 %    Platelet Count 274 150 - 450 10e3/uL   HIV Antigen Antibody Combo    Collection Time: 08/16/21 11:41 AM   Result Value Ref Range    HIV Antigen Antibody Combo Negative Negative   Hepatitis C antibody    Collection Time: 08/16/21 11:41 AM   Result Value Ref Range    Hepatitis C Antibody Negative Negative   Drugs of Abuse 1 Panel, Urine ([facility])    Collection Time: 08/16/21 11:43 AM   Result Value Ref Range    Amphetamines Urine Screen Negative Screen Negative    Benzodiazepines Urine Screen Negative Screen Negative    Opiates Urine Screen Negative Screen Negative    PCP Urine Screen Negative Screen Negative    Cannabinoids Urine Screen Negative Screen Negative    Barbiturates Urine Screen Negative Screen Negative    Cocaine Urine Screen Negative Screen Negative    Oxycodone Urine Screen Negative Screen Negative    Creatinine Urine mg/dL 11 mg/dL

## 2021-08-17 DIAGNOSIS — E87.5 SERUM POTASSIUM ELEVATED: Primary | ICD-10-CM

## 2021-08-17 LAB — HCV AB SERPL QL IA: NEGATIVE

## 2021-08-17 ASSESSMENT — ANXIETY QUESTIONNAIRES: GAD7 TOTAL SCORE: 0

## 2021-08-24 DIAGNOSIS — M00.9 PYOGENIC ARTHRITIS OF RIGHT HAND, DUE TO UNSPECIFIED ORGANISM (H): ICD-10-CM

## 2021-08-24 RX ORDER — HYDROCODONE BITARTRATE AND ACETAMINOPHEN 5; 325 MG/1; MG/1
1 TABLET ORAL EVERY 6 HOURS PRN
Qty: 30 TABLET | Refills: 0 | Status: SHIPPED | OUTPATIENT
Start: 2021-08-24 | End: 2021-09-23

## 2021-08-24 NOTE — TELEPHONE ENCOUNTER
Medication: Hydrocodone     Last Date Filled 7/28/2021     pulled: NO      Only PCP Prescribing?: YES    Taken as prescribed from physician notes?: YES  .  She uses a limited Vicodin for her pains and has a controlled substance agreement.  This is about 30 pills a month.  Urine drug screen will be obtained today and a CSA is being signed.     CSA in last year: YES    Random Utox in last year: YES    Opioids + benzodiazepines? NO

## 2021-09-22 DIAGNOSIS — M00.9 PYOGENIC ARTHRITIS OF RIGHT HAND, DUE TO UNSPECIFIED ORGANISM (H): ICD-10-CM

## 2021-09-23 RX ORDER — HYDROCODONE BITARTRATE AND ACETAMINOPHEN 5; 325 MG/1; MG/1
1 TABLET ORAL EVERY 6 HOURS PRN
Qty: 30 TABLET | Refills: 0 | Status: SHIPPED | OUTPATIENT
Start: 2021-09-23 | End: 2021-10-21

## 2021-10-05 ENCOUNTER — ANCILLARY PROCEDURE (OUTPATIENT)
Dept: MAMMOGRAPHY | Facility: CLINIC | Age: 61
End: 2021-10-05
Attending: FAMILY MEDICINE
Payer: COMMERCIAL

## 2021-10-05 DIAGNOSIS — Z00.00 ANNUAL PHYSICAL EXAM: ICD-10-CM

## 2021-10-05 PROCEDURE — 77067 SCR MAMMO BI INCL CAD: CPT

## 2021-10-21 DIAGNOSIS — M00.9 PYOGENIC ARTHRITIS OF RIGHT HAND, DUE TO UNSPECIFIED ORGANISM (H): ICD-10-CM

## 2021-10-21 RX ORDER — HYDROCODONE BITARTRATE AND ACETAMINOPHEN 5; 325 MG/1; MG/1
1 TABLET ORAL EVERY 6 HOURS PRN
Qty: 30 TABLET | Refills: 0 | Status: SHIPPED | OUTPATIENT
Start: 2021-10-21 | End: 2021-11-19

## 2021-11-15 ENCOUNTER — IMMUNIZATION (OUTPATIENT)
Dept: NURSING | Facility: CLINIC | Age: 61
End: 2021-11-15
Payer: COMMERCIAL

## 2021-11-15 PROCEDURE — 91306 COVID-19,PF,MODERNA (18+ YRS BOOSTER .25ML): CPT

## 2021-11-15 PROCEDURE — 0064A COVID-19,PF,MODERNA (18+ YRS BOOSTER .25ML): CPT

## 2021-11-19 DIAGNOSIS — M00.9 PYOGENIC ARTHRITIS OF RIGHT HAND, DUE TO UNSPECIFIED ORGANISM (H): ICD-10-CM

## 2021-11-19 RX ORDER — HYDROCODONE BITARTRATE AND ACETAMINOPHEN 5; 325 MG/1; MG/1
1 TABLET ORAL EVERY 6 HOURS PRN
Qty: 30 TABLET | Refills: 0 | Status: SHIPPED | OUTPATIENT
Start: 2021-11-19 | End: 2021-12-16

## 2021-11-19 NOTE — TELEPHONE ENCOUNTER
Pending Prescriptions:                       Disp   Refills    HYDROcodone-acetaminophen (NORCO) 5-325 M*30 tab*0            Sig: Take 1 tablet by mouth every 6 hours as needed

## 2021-12-16 DIAGNOSIS — M00.9 PYOGENIC ARTHRITIS OF RIGHT HAND, DUE TO UNSPECIFIED ORGANISM (H): ICD-10-CM

## 2021-12-16 RX ORDER — HYDROCODONE BITARTRATE AND ACETAMINOPHEN 5; 325 MG/1; MG/1
1 TABLET ORAL EVERY 6 HOURS PRN
Qty: 30 TABLET | Refills: 0 | Status: SHIPPED | OUTPATIENT
Start: 2021-12-16 | End: 2022-01-13

## 2021-12-16 NOTE — TELEPHONE ENCOUNTER
Reason for Call:  Medication or medication refill:    Do you use a [facility]?  Name of the pharmacy and phone number for the current request:  [facility]/PHARMACY #[id number] - [facility], [place] - [address]    Name of the medication requested: HYDROcodone-acetaminophen (NORCO) 5-325 MG tablet    Other request: na    Can we leave a detailed message on this number? YES    Phone number patient can be reached at: Cell number on file:    Telephone Information:   Mobile [phone]       Best Time: na    Call taken on 12/16/2021 at 12:29 PM by Ya Mayo

## 2022-01-12 DIAGNOSIS — M00.9 PYOGENIC ARTHRITIS OF RIGHT HAND, DUE TO UNSPECIFIED ORGANISM (H): ICD-10-CM

## 2022-01-12 NOTE — TELEPHONE ENCOUNTER
Reason for Call:  Medication or medication refill:    Do you use a [facility]?  Name of the pharmacy and phone number for the current request:  [facility]/PHARMACY #[id number] - [facility], [place] - [address] [facility]    Name of the medication requested: HYDROcodone-acetaminophen (NORCO) 5-325 MG tablet    Other request: N/A    Can we leave a detailed message on this number? YES    Phone number patient can be reached at: Cell number on file:    Telephone Information:   Mobile [phone]       Best Time: Any time    Call taken on 1/12/2022 at 1:23 PM by Ronaldo Navas

## 2022-01-13 RX ORDER — HYDROCODONE BITARTRATE AND ACETAMINOPHEN 5; 325 MG/1; MG/1
1 TABLET ORAL EVERY 6 HOURS PRN
Qty: 30 TABLET | Refills: 0 | Status: SHIPPED | OUTPATIENT
Start: 2022-01-13 | End: 2022-02-11

## 2022-02-10 DIAGNOSIS — M00.9 PYOGENIC ARTHRITIS OF RIGHT HAND, DUE TO UNSPECIFIED ORGANISM (H): ICD-10-CM

## 2022-02-10 NOTE — TELEPHONE ENCOUNTER
Reason for Call:  Medication or medication refill:    Do you use a [facility]?  Name of the pharmacy and phone number for the current request:      [facility]/PHARMACY #[id number] - [facility], [place] - [address]  Name of the medication requested:   HYDROcodone-acetaminophen (NORCO) 5-325 MG tablet    Other request: na    Can we leave a detailed message on this number? YES    Phone number patient can be reached at: Home number on file [phone] (home)    Best Time: any    Call taken on 2/10/2022 at 4:05 PM by RHIANNON NINA

## 2022-02-10 NOTE — TELEPHONE ENCOUNTER
Notes from 08/16/21:  Medical decision making: Paulina  is here today for annual exam.  She uses a limited Vicodin for her pains and has a controlled substance agreement.  This is about 30 pills a month.  Urine drug screen will be obtained today and a CSA is being signed

## 2022-02-11 RX ORDER — HYDROCODONE BITARTRATE AND ACETAMINOPHEN 5; 325 MG/1; MG/1
1 TABLET ORAL EVERY 6 HOURS PRN
Qty: 30 TABLET | Refills: 0 | Status: SHIPPED | OUTPATIENT
Start: 2022-02-11 | End: 2022-03-10

## 2022-03-10 DIAGNOSIS — M00.9 PYOGENIC ARTHRITIS OF RIGHT HAND, DUE TO UNSPECIFIED ORGANISM (H): ICD-10-CM

## 2022-03-10 RX ORDER — HYDROCODONE BITARTRATE AND ACETAMINOPHEN 5; 325 MG/1; MG/1
1 TABLET ORAL EVERY 6 HOURS PRN
Qty: 30 TABLET | Refills: 0 | Status: SHIPPED | OUTPATIENT
Start: 2022-03-10 | End: 2022-04-08

## 2022-03-10 NOTE — TELEPHONE ENCOUNTER
Notes from 08/16/21:  Medical decision making: Paulina  is here today for annual exam.  She uses a limited Vicodin for her pains and has a controlled substance agreement.  This is about 30 pills a month.  Urine drug screen will be obtained today and a CSA is being signed.

## 2022-03-10 NOTE — TELEPHONE ENCOUNTER
Reason for Call:  Medication or medication refill:    Do you use a [facility]?  Name of the pharmacy and phone number for the current request:  [facility]/PHARMACY #[id number] - [facility], [place] - [address]    Name of the medication requested: HYDROcodone-acetaminophen (NORCO) 5-325 MG tablet    Other request: na    Can we leave a detailed message on this number? YES    Phone number patient can be reached at: Cell number on file:    Telephone Information:   Mobile [phone]       Best Time: na    Call taken on 3/10/2022 at 8:17 AM by Ya Mayo

## 2022-04-08 DIAGNOSIS — M00.9 PYOGENIC ARTHRITIS OF RIGHT HAND, DUE TO UNSPECIFIED ORGANISM (H): ICD-10-CM

## 2022-04-08 RX ORDER — HYDROCODONE BITARTRATE AND ACETAMINOPHEN 5; 325 MG/1; MG/1
1 TABLET ORAL EVERY 6 HOURS PRN
Qty: 30 TABLET | Refills: 0 | Status: SHIPPED | OUTPATIENT
Start: 2022-04-08 | End: 2022-05-06

## 2022-04-08 NOTE — TELEPHONE ENCOUNTER
Reason for Call:  Medication or medication refill:    Do you use a [facility]?  Name of the pharmacy and phone number for the current request:  [facility]/PHARMACY #[id number] - [place] [facility], [place] - [address]    Name of the medication requested: HYDROcodone-acetaminophen (NORCO) 5-325 MG tablet    Other request: Patient has 2 pills left.    Can we leave a detailed message on this number? YES    Phone number patient can be reached at: Cell number on file:    Telephone Information:   Mobile [phone]       Best Time: any    Call taken on 4/8/2022 at 8:33 AM by Muna Martinez

## 2022-04-11 ENCOUNTER — OFFICE VISIT (OUTPATIENT)
Dept: FAMILY MEDICINE | Facility: CLINIC | Age: 62
End: 2022-04-11
Payer: COMMERCIAL

## 2022-04-11 VITALS
BODY MASS INDEX: 21.02 KG/M2 | WEIGHT: 126.2 LBS | DIASTOLIC BLOOD PRESSURE: 73 MMHG | HEIGHT: 65 IN | SYSTOLIC BLOOD PRESSURE: 113 MMHG | OXYGEN SATURATION: 99 % | HEART RATE: 61 BPM

## 2022-04-11 DIAGNOSIS — M25.50 POLYARTHRALGIA: Primary | ICD-10-CM

## 2022-04-11 DIAGNOSIS — Z12.11 SCREEN FOR COLON CANCER: ICD-10-CM

## 2022-04-11 DIAGNOSIS — Z78.0 POST-MENOPAUSAL: ICD-10-CM

## 2022-04-11 PROBLEM — M00.9: Status: RESOLVED | Noted: 2020-10-22 | Resolved: 2022-04-11

## 2022-04-11 PROBLEM — M00.9 SEPTIC JOINT (H): Status: RESOLVED | Noted: 2020-10-22 | Resolved: 2022-04-11

## 2022-04-11 PROCEDURE — 99213 OFFICE O/P EST LOW 20 MIN: CPT | Performed by: FAMILY MEDICINE

## 2022-04-11 NOTE — PROGRESS NOTES
Assessment & Plan     ICD-10-CM    1. Polyarthralgia  M25.50    2. Screen for colon cancer  Z12.11 Adult Gastro Ref - Procedure Only   3. Post-menopausal  Z78.0 DX Hip/Pelvis/Spine     Medical history making: Patient is here today for follow-up of substance agreement.  She takes 1 Vicodin daily for her polyarthralgias.  She is also taking gabapentin.  PDMP reviewed.  We will pursue CSA and drug urine screening at next preventive visit.  Discussed her BMI and she is exercising to lose weight.  We will get a baseline DEXA scan.      Return in about 5 months (around 9/11/2022) for Routine preventive.    Salima Lockhart MD  [facility]    Subjective    Chief Complaint   Patient presents with     Recheck Medication     Follow up and med check, no questions or concerns        Paulina is a 61 year old who presents for the following health issues     History of Present Illness       Reason for visit:  Medicine appr  Had these symptoms before:  Yes  Has tried/received treatment for these symptoms:  Yes    She eats 2-3 servings of fruits and vegetables daily.She consumes 0 sweetened beverage(s) daily.She exercises with enough effort to increase her heart rate 20 to 29 minutes per day.  She exercises with enough effort to increase her heart rate 5 days per week.   She is taking medications regularly.       Patient Active Problem List   Diagnosis     Congenital absence of vagina     Chronic bilateral low back pain with right-sided sciatica     Raynaud's disease without gangrene     Chronic constipation     Pain of right hand     Narcotic drug use- For chronic back pain- # 30 pills /  month CSA/ DAM- 08/2021     Controlled substance agreement signed     Abnormal mammogram     Polyarthralgia     Anxiety     PONV (postoperative nausea and vomiting)     On hormone replacement therapy     Backache     Osteoporosis         Review of Systems   Constitutional, HEENT, cardiovascular, pulmonary, gi and gu  "systems are negative, except as otherwise noted.      Objective    /73 (BP Location: Left arm, Patient Position: Sitting, Cuff Size: Adult Regular)   Pulse 61   Ht 1.651 m (5' 5\")   Wt 57.2 kg (126 lb 3.2 oz)   SpO2 99%   BMI 21.00 kg/m    Body mass index is 21 kg/m .  Physical Exam   GENERAL: Healthy, alert and no distress  EYES: Eyes grossly normal to inspection.  No discharge or erythema, or obvious scleral/conjunctival abnormalities.  RESP: No audible wheeze, cough, or visible cyanosis.  No visible retractions or increased work of breathing.    SKIN: Visible skin clear. No significant rash, abnormal pigmentation or lesions.  NEURO: Cranial nerves grossly intact.  Mentation and speech appropriate for age.  PSYCH: Mentation appears normal, affect normal/bright, judgement and insight intact, normal speech and appearance well-groomed.            "

## 2022-05-06 ENCOUNTER — TELEPHONE (OUTPATIENT)
Dept: FAMILY MEDICINE | Facility: CLINIC | Age: 62
End: 2022-05-06
Payer: COMMERCIAL

## 2022-05-06 DIAGNOSIS — M00.9 PYOGENIC ARTHRITIS OF RIGHT HAND, DUE TO UNSPECIFIED ORGANISM (H): ICD-10-CM

## 2022-05-06 RX ORDER — HYDROCODONE BITARTRATE AND ACETAMINOPHEN 5; 325 MG/1; MG/1
1 TABLET ORAL EVERY 6 HOURS PRN
Qty: 30 TABLET | Refills: 0 | Status: SHIPPED | OUTPATIENT
Start: 2022-05-06 | End: 2022-06-06

## 2022-05-06 NOTE — TELEPHONE ENCOUNTER
Note from 04/11/22:  Patient is here today for follow-up of substance agreement.  She takes 1 Vicodin daily for her polyarthralgias.  She is also taking gabapentin.  PDMP reviewed.  We will pursue CSA and drug urine screening at next preventive visit.

## 2022-05-06 NOTE — TELEPHONE ENCOUNTER
Reason for Call:  Medication or medication refill:    Do you use a [facility]?  Name of the pharmacy and phone number for the current request:    [facility]/PHARMACY #[id number] - [facility], [place] - [address]    Name of the medication requested:     HYDROcodone-acetaminophen (NORCO) 5-325 MG tablet  Other request: na    Can we leave a detailed message on this number? YES    Phone number patient can be reached at: Home number on file [phone] (home)    Best Time: any    Call taken on 5/6/2022 at 8:45 AM by RHIANNON NINA

## 2022-05-16 DIAGNOSIS — F32.A DEPRESSION: ICD-10-CM

## 2022-05-16 RX ORDER — ESCITALOPRAM OXALATE 20 MG/1
20 TABLET ORAL DAILY
Qty: 90 TABLET | Refills: 3 | Status: SHIPPED | OUTPATIENT
Start: 2022-05-16 | End: 2023-05-09

## 2022-06-06 DIAGNOSIS — M00.9 PYOGENIC ARTHRITIS OF RIGHT HAND, DUE TO UNSPECIFIED ORGANISM (H): ICD-10-CM

## 2022-06-06 RX ORDER — HYDROCODONE BITARTRATE AND ACETAMINOPHEN 5; 325 MG/1; MG/1
1 TABLET ORAL EVERY 6 HOURS PRN
Qty: 30 TABLET | Refills: 0 | Status: SHIPPED | OUTPATIENT
Start: 2022-06-06 | End: 2022-07-05

## 2022-06-06 NOTE — TELEPHONE ENCOUNTER
Reason for Call:  Other prescription    Detailed comments: Refill on Hydrocodone   Last Rx 5/6/22   #30   Last med check 4/11/22   UDS & CSA  8/16/21       Phone Number Patient can be reached at: Cell number on file:    Telephone Information:   Mobile [phone]       Best Time:     Can we leave a detailed message on this number? NO    Call taken on 6/6/2022 at 8:23 AM by Gabby Timmons

## 2022-06-20 ENCOUNTER — IMMUNIZATION (OUTPATIENT)
Dept: NURSING | Facility: CLINIC | Age: 62
End: 2022-06-20
Payer: COMMERCIAL

## 2022-06-20 PROCEDURE — 0064A COVID-19,PF,MODERNA (18+ YRS BOOSTER .25ML): CPT

## 2022-06-20 PROCEDURE — 91306 COVID-19,PF,MODERNA (18+ YRS BOOSTER .25ML): CPT

## 2022-07-05 DIAGNOSIS — M00.9 PYOGENIC ARTHRITIS OF RIGHT HAND, DUE TO UNSPECIFIED ORGANISM (H): ICD-10-CM

## 2022-07-05 RX ORDER — HYDROCODONE BITARTRATE AND ACETAMINOPHEN 5; 325 MG/1; MG/1
1 TABLET ORAL EVERY 6 HOURS PRN
Qty: 30 TABLET | Refills: 0 | Status: SHIPPED | OUTPATIENT
Start: 2022-07-05 | End: 2022-08-02

## 2022-07-05 NOTE — TELEPHONE ENCOUNTER
Reason for call:  Medication   If this is a refill request, has the caller requested the refill from the pharmacy already? No  Will the patient be using a [facility]? No  Name of the pharmacy and phone number for the current request: [facility]/PHARMACY #[id number] - [facility], [place] - [address]    Name of the medication requested: HYDROcodone-acetaminophen (NORCO) 5-325 MG tablet        Phone number to reach patient:  Home number on file [phone] (home)    Best Time:  Anytime    Can we leave a detailed message on this number?  YES    Travel screening: Not Applicable

## 2022-08-02 ENCOUNTER — LAB REQUISITION (OUTPATIENT)
Dept: LAB | Facility: CLINIC | Age: 62
End: 2022-08-02

## 2022-08-02 DIAGNOSIS — Z12.4 ENCOUNTER FOR SCREENING FOR MALIGNANT NEOPLASM OF CERVIX: ICD-10-CM

## 2022-08-02 DIAGNOSIS — M00.9 PYOGENIC ARTHRITIS OF RIGHT HAND, DUE TO UNSPECIFIED ORGANISM (H): ICD-10-CM

## 2022-08-02 PROCEDURE — G0123 SCREEN CERV/VAG THIN LAYER: HCPCS | Performed by: OBSTETRICS & GYNECOLOGY

## 2022-08-02 PROCEDURE — 87624 HPV HI-RISK TYP POOLED RSLT: CPT | Performed by: OBSTETRICS & GYNECOLOGY

## 2022-08-02 RX ORDER — HYDROCODONE BITARTRATE AND ACETAMINOPHEN 5; 325 MG/1; MG/1
1 TABLET ORAL EVERY 6 HOURS PRN
Qty: 30 TABLET | Refills: 0 | Status: SHIPPED | OUTPATIENT
Start: 2022-08-02 | End: 2022-08-29

## 2022-08-02 NOTE — TELEPHONE ENCOUNTER
Pending Prescriptions:                       Disp   Refills    HYDROcodone-acetaminophen (NORCO) 5-325 M*30 tab*0            Sig: Take 1 tablet by mouth every 6 hours as needed           for severe pain

## 2022-08-05 LAB
BKR LAB AP GYN ADEQUACY: NORMAL
BKR LAB AP GYN INTERPRETATION: NORMAL
BKR LAB AP HPV REFLEX: NORMAL
BKR LAB AP LMP: NORMAL
BKR LAB AP PREVIOUS ABNL DX: NORMAL
BKR LAB AP PREVIOUS ABNORMAL: NORMAL
PATH REPORT.COMMENTS IMP SPEC: NORMAL
PATH REPORT.COMMENTS IMP SPEC: NORMAL
PATH REPORT.RELEVANT HX SPEC: NORMAL

## 2022-08-09 LAB
HUMAN PAPILLOMA VIRUS 16 DNA: NEGATIVE
HUMAN PAPILLOMA VIRUS 18 DNA: NEGATIVE
HUMAN PAPILLOMA VIRUS FINAL DIAGNOSIS: NORMAL
HUMAN PAPILLOMA VIRUS OTHER HR: NEGATIVE

## 2022-08-29 ENCOUNTER — TELEPHONE (OUTPATIENT)
Dept: FAMILY MEDICINE | Facility: CLINIC | Age: 62
End: 2022-08-29

## 2022-08-29 DIAGNOSIS — M00.9 PYOGENIC ARTHRITIS OF RIGHT HAND, DUE TO UNSPECIFIED ORGANISM (H): ICD-10-CM

## 2022-08-29 RX ORDER — HYDROCODONE BITARTRATE AND ACETAMINOPHEN 5; 325 MG/1; MG/1
1 TABLET ORAL EVERY 6 HOURS PRN
Qty: 30 TABLET | Refills: 0 | Status: SHIPPED | OUTPATIENT
Start: 2022-08-29 | End: 2022-09-26

## 2022-08-29 NOTE — TELEPHONE ENCOUNTER
Incoming call from pt: requesting refill of HYDROcodone-acetaminophen 5-325MG. Please send prescription to [facility] [phone]

## 2022-09-06 DIAGNOSIS — Z00.00 ANNUAL PHYSICAL EXAM: ICD-10-CM

## 2022-09-06 RX ORDER — GABAPENTIN 100 MG/1
300 CAPSULE ORAL AT BEDTIME
Qty: 270 CAPSULE | Refills: 3 | Status: SHIPPED | OUTPATIENT
Start: 2022-09-06 | End: 2023-09-05

## 2022-09-25 ENCOUNTER — HEALTH MAINTENANCE LETTER (OUTPATIENT)
Age: 62
End: 2022-09-25

## 2022-09-26 DIAGNOSIS — M00.9 PYOGENIC ARTHRITIS OF RIGHT HAND, DUE TO UNSPECIFIED ORGANISM (H): ICD-10-CM

## 2022-09-26 RX ORDER — HYDROCODONE BITARTRATE AND ACETAMINOPHEN 5; 325 MG/1; MG/1
1 TABLET ORAL EVERY 6 HOURS PRN
Qty: 30 TABLET | Refills: 0 | Status: SHIPPED | OUTPATIENT
Start: 2022-09-26 | End: 2022-10-25

## 2022-09-26 NOTE — TELEPHONE ENCOUNTER
Medication Question or Refill        What medication are you calling about (include dose and sig)?: Hydrocodone-acetaminophen 5-325MG tablet    Controlled Substance Agreement on file:   CSA -- Patient Level:    Controlled Substance Agreement - Opioid - Scan on 8/23/2021 10:38 AM  Controlled Substance Agreement - Non - Opioid - Scan on 9/16/2020: NON-OPIOID CONTROLLED SUBSTANCE AGREEMENT  Controlled Substance Agreement - Opioid - Scan on 1/7/2019       Who prescribed the medication?: Dr. Lockhart    Do you need a refill? Yes    When did you use the medication last? today    Patient offered an appointment? No    Do you have any questions or concerns?  No    Preferred Pharmacy:   [facility]/pharmacy #[id number] - [facility], [place] - [address] [address]  Phone: [phone] Fax: [phone]      Could we send this information to you in [facility] or would you prefer to receive a phone call?:   Patient would prefer a phone call   Okay to leave a detailed message?: Yes at Cell number on file:    Telephone Information:   Mobile [phone]

## 2022-10-25 DIAGNOSIS — M00.9 PYOGENIC ARTHRITIS OF RIGHT HAND, DUE TO UNSPECIFIED ORGANISM (H): ICD-10-CM

## 2022-10-25 RX ORDER — HYDROCODONE BITARTRATE AND ACETAMINOPHEN 5; 325 MG/1; MG/1
1 TABLET ORAL EVERY 6 HOURS PRN
Qty: 30 TABLET | Refills: 0 | Status: SHIPPED | OUTPATIENT
Start: 2022-10-25 | End: 2022-11-25

## 2022-10-25 NOTE — TELEPHONE ENCOUNTER
Medication Question or Refill    What medication are you calling about (include dose and sig)?:   HYDROcodone-acetaminophen (NORCO) 5-325 MG tablet  Take 1 tablet by mouth every 6 hours as needed for severe pain    Controlled Substance Agreement on file:   CSA -- Patient Level:     [Media Unavailable] Controlled Substance Agreement - Opioid - Scan on 8/23/2021 10:38 AM   [Media Unavailable] Controlled Substance Agreement - Non - Opioid - Scan on 9/16/2020: NON-OPIOID CONTROLLED SUBSTANCE AGREEMENT   [Media Unavailable] Controlled Substance Agreement - Opioid - Scan on 1/7/2019       Who prescribed the medication?: Dr. Lockhart    Do you need a refill? Yes    When did you use the medication last? Today    Patient offered an appointment? Yes, 12/23/22    Do you have any questions or concerns?  No    Preferred Pharmacy:   [facility]/pharmacy #[id number] - [place] [id number] [address]  Phone: [phone] Fax: [phone]    Could we send this information to you in [facility] or would you prefer to receive a phone call?:   Patient would prefer a phone call     Okay to leave a detailed message?: Yes at Cell number on file:    Telephone Information:   Mobile [phone]

## 2022-11-23 DIAGNOSIS — M00.9 PYOGENIC ARTHRITIS OF RIGHT HAND, DUE TO UNSPECIFIED ORGANISM (H): ICD-10-CM

## 2022-11-23 NOTE — TELEPHONE ENCOUNTER
Patient called requesting a refill on hydrocodone.     No call back needed unless there are questions.     OK to leave detailed message

## 2022-11-25 RX ORDER — HYDROCODONE BITARTRATE AND ACETAMINOPHEN 5; 325 MG/1; MG/1
1 TABLET ORAL EVERY 6 HOURS PRN
Qty: 30 TABLET | Refills: 0 | Status: SHIPPED | OUTPATIENT
Start: 2022-11-25 | End: 2022-12-21

## 2022-12-19 ENCOUNTER — IMMUNIZATION (OUTPATIENT)
Dept: NURSING | Facility: CLINIC | Age: 62
End: 2022-12-19
Payer: COMMERCIAL

## 2022-12-19 PROCEDURE — 0134A COVID-19 VACCINE BIVALENT BOOSTER 18+ (MODERNA): CPT

## 2022-12-19 PROCEDURE — 91313 COVID-19 VACCINE BIVALENT BOOSTER 18+ (MODERNA): CPT

## 2022-12-20 DIAGNOSIS — M00.9 PYOGENIC ARTHRITIS OF RIGHT HAND, DUE TO UNSPECIFIED ORGANISM (H): ICD-10-CM

## 2022-12-20 NOTE — TELEPHONE ENCOUNTER
Patient has appt to see Dr. OCASIO on 12-23-22 for med check.  Concerned with the bad weather coming in that something may happen with the appt.  Would like to get refill started now before the weather and the holiday weekend.  Please review and send refill.      Ok to leave detailed message.

## 2022-12-21 RX ORDER — HYDROCODONE BITARTRATE AND ACETAMINOPHEN 5; 325 MG/1; MG/1
1 TABLET ORAL EVERY 6 HOURS PRN
Qty: 30 TABLET | Refills: 0 | Status: SHIPPED | OUTPATIENT
Start: 2022-12-21 | End: 2023-01-19

## 2022-12-23 ENCOUNTER — OFFICE VISIT (OUTPATIENT)
Dept: FAMILY MEDICINE | Facility: CLINIC | Age: 62
End: 2022-12-23
Payer: COMMERCIAL

## 2022-12-23 VITALS
TEMPERATURE: 97.4 F | RESPIRATION RATE: 16 BRPM | DIASTOLIC BLOOD PRESSURE: 73 MMHG | SYSTOLIC BLOOD PRESSURE: 120 MMHG | OXYGEN SATURATION: 99 % | WEIGHT: 128.2 LBS | HEIGHT: 65 IN | BODY MASS INDEX: 21.36 KG/M2 | HEART RATE: 74 BPM

## 2022-12-23 DIAGNOSIS — Z79.899 ENCOUNTER FOR LONG-TERM (CURRENT) USE OF MEDICATIONS: ICD-10-CM

## 2022-12-23 DIAGNOSIS — M54.41 CHRONIC BILATERAL LOW BACK PAIN WITH RIGHT-SIDED SCIATICA: Primary | ICD-10-CM

## 2022-12-23 DIAGNOSIS — Z12.11 SCREEN FOR COLON CANCER: ICD-10-CM

## 2022-12-23 DIAGNOSIS — G89.29 CHRONIC BILATERAL LOW BACK PAIN WITH RIGHT-SIDED SCIATICA: Primary | ICD-10-CM

## 2022-12-23 LAB
CANNABINOIDS UR QL SCN: NORMAL
CREAT UR-MCNC: 9 MG/DL

## 2022-12-23 PROCEDURE — 99213 OFFICE O/P EST LOW 20 MIN: CPT | Performed by: FAMILY MEDICINE

## 2022-12-23 PROCEDURE — 80307 DRUG TEST PRSMV CHEM ANLYZR: CPT | Performed by: FAMILY MEDICINE

## 2022-12-23 ASSESSMENT — ANXIETY QUESTIONNAIRES
GAD7 TOTAL SCORE: 0
IF YOU CHECKED OFF ANY PROBLEMS ON THIS QUESTIONNAIRE, HOW DIFFICULT HAVE THESE PROBLEMS MADE IT FOR YOU TO DO YOUR WORK, TAKE CARE OF THINGS AT HOME, OR GET ALONG WITH OTHER PEOPLE: NOT DIFFICULT AT ALL
4. TROUBLE RELAXING: NOT AT ALL
GAD7 TOTAL SCORE: 0
1. FEELING NERVOUS, ANXIOUS, OR ON EDGE: NOT AT ALL
3. WORRYING TOO MUCH ABOUT DIFFERENT THINGS: NOT AT ALL
2. NOT BEING ABLE TO STOP OR CONTROL WORRYING: NOT AT ALL
7. FEELING AFRAID AS IF SOMETHING AWFUL MIGHT HAPPEN: NOT AT ALL
6. BECOMING EASILY ANNOYED OR IRRITABLE: NOT AT ALL
7. FEELING AFRAID AS IF SOMETHING AWFUL MIGHT HAPPEN: NOT AT ALL
5. BEING SO RESTLESS THAT IT IS HARD TO SIT STILL: NOT AT ALL
GAD7 TOTAL SCORE: 0
8. IF YOU CHECKED OFF ANY PROBLEMS, HOW DIFFICULT HAVE THESE MADE IT FOR YOU TO DO YOUR WORK, TAKE CARE OF THINGS AT HOME, OR GET ALONG WITH OTHER PEOPLE?: NOT DIFFICULT AT ALL

## 2022-12-23 ASSESSMENT — PATIENT HEALTH QUESTIONNAIRE - PHQ9
SUM OF ALL RESPONSES TO PHQ QUESTIONS 1-9: 0
10. IF YOU CHECKED OFF ANY PROBLEMS, HOW DIFFICULT HAVE THESE PROBLEMS MADE IT FOR YOU TO DO YOUR WORK, TAKE CARE OF THINGS AT HOME, OR GET ALONG WITH OTHER PEOPLE: NOT DIFFICULT AT ALL
SUM OF ALL RESPONSES TO PHQ QUESTIONS 1-9: 0

## 2022-12-23 NOTE — PROGRESS NOTES
Assessment & Plan     ICD-10-CM    1. Chronic bilateral low back pain with right-sided sciatica  M54.41     G89.29       2. Screen for colon cancer  Z12.11 Colonoscopy Screening  Referral      3. Encounter for long-term (current) use of medications  Z79.899 [id number] - Urine Drug Confirmation Panel (Comprehensive)        Medication making: Patient is here today for controlled substance agreement.  She uses hydrocodone once a day at night for her back pain.  No other concerns.  Follows with OB/GYN for hormone therapy for congenital absence vagina.  Was noted to have slightly high elevated potassium and will come back for repeat blood work.  Will schedule physical during summertime.    Return in about 6 months (around 6/23/2023) for Routine preventive.    Salima Lockhart MD  [facility]    Nilay Gallardo is a 62 year old, presenting for the following health issues:  Medication Update      History of Present Illness       Reason for visit:  Regular drug screening    She eats 2-3 servings of fruits and vegetables daily.She consumes 0 sweetened beverage(s) daily.She exercises with enough effort to increase her heart rate 20 to 29 minutes per day.  She exercises with enough effort to increase her heart rate 4 days per week.   She is taking medications regularly.    Today's PHQ-9         PHQ-9 Total Score: 0    PHQ-9 Q9 Thoughts of better off dead/self-harm past 2 weeks :   Not at all    How difficult have these problems made it for you to do your work, take care of things at home, or get along with other people: Not difficult at all  Today's FARZANA-7 Score: 0     Patient Active Problem List   Diagnosis     Congenital absence of vagina     Chronic bilateral low back pain with right-sided sciatica     Raynaud's disease without gangrene     Chronic constipation     Pain of right hand     Narcotic drug use- For chronic back pain- # 30 pills /  month CSA/ DAM- 08/2021     Controlled  "substance agreement signed     Abnormal mammogram     Polyarthralgia     Anxiety     PONV (postoperative nausea and vomiting)     On hormone replacement therapy     Backache     Osteoporosis     Current Outpatient Medications   Medication     biotin 5 mg Tab     calcium carbonate (OS-MARISSA) 500 mg calcium (1,250 mg) chewable tablet     cholecalciferol, vitamin D3, 5,000 unit Tab     docusate sodium (COLACE) 100 MG capsule     escitalopram (LEXAPRO) 20 MG tablet     estradioL (VIVELLE-DOT) 0.05 mg/24 hr     gabapentin (NEURONTIN) 100 MG capsule     glucosamine/chondr wright A sod (OSTEO BI-FLEX ORAL)     HYDROcodone-acetaminophen (NORCO) 5-325 MG tablet     lysine 500 mg Tab     mv-min/iron/folic/calcium/vitK (WOMEN'S 50 PLUS MULTIVIT-IRON ORAL)     naproxen sodium (ALEVE) 220 MG tablet     peg 400-propylene glycol (SYSTANE, PROPYLENE GLYCOL,) 0.4-0.3 % Drop     TURMERIC ORAL     No current facility-administered medications for this visit.       Review of Systems   Constitutional, HEENT, cardiovascular, pulmonary, gi and gu systems are negative, except as otherwise noted.      Objective    /73 (BP Location: Left arm, Patient Position: Sitting, Cuff Size: Adult Regular)   Pulse 74   Temp 97.4  F (36.3  C) (Oral)   Resp 16   Ht 1.651 m (5' 5\")   Wt 58.2 kg (128 lb 3.2 oz)   SpO2 99%   BMI 21.33 kg/m    Body mass index is 21.33 kg/m .  Physical Exam   GENERAL: healthy, alert and no distress              Patient answers are not available for this visit.  "

## 2022-12-23 NOTE — LETTER
Opioid / Opioid Plus Controlled Substance Agreement    This is an agreement between you and your provider about the safe and appropriate use of controlled substance/opioids prescribed by your care team. Controlled substances are medicines that can cause physical and mental dependence (abuse).    There are strict laws about having and using these medicines. We here at [facility] are committing to working with you in your efforts to get better. To support you in this work, we ll help you schedule regular office appointments for medicine refills. If we must cancel or change your appointment for any reason, we ll make sure you have enough medicine to last until your next appointment.     As a Provider, I will:    Listen carefully to your concerns and treat you with respect.     Recommend a treatment plan that I believe is in your best interest. This plan may involve therapies other than opioid pain medication.     Talk with you often about the possible benefits, and the risk of harm of any medicine that we prescribe for you.     Provide a plan on how to taper (discontinue or go off) using this medicine if the decision is made to stop its use.    As a Patient, I understand that opioid(s):     Are a controlled substance prescribed by my care team to help me function or work and manage my condition(s).     Are strong medicines and can cause serious side effects such as:    Drowsiness, which can seriously affect my driving ability    A lower breathing rate, enough to cause death    Harm to my thinking ability     Depression     Abuse of and addiction to this medicine    Need to be taken exactly as prescribed. Combining opioids with certain medicines or chemicals (such as illegal drugs, sedatives, sleeping pills, and benzodiazepines) can be dangerous or even fatal. If I stop opioids suddenly, I may have severe withdrawal symptoms.    Do not work for all types of pain nor for all patients. If they re not helpful, I may  be asked to stop them.        The risks, benefits and side effects of these medicine(s) were explained to me. I agree that:  1. I will take part in other treatments as advised by my care team. This may be psychiatry or counseling, physical therapy, behavioral therapy, group treatment or a referral to a specialist.     2. I will keep all my appointments. I understand that this is part of the monitoring of opioids. My care team may require an office visit for EVERY opioid/controlled substance refill. If I miss appointments or don t follow instructions, my care team may stop my medicine.    3. I will take my medicines as prescribed. I will not change the dose or schedule unless my care team tells me to. There will be no refills if I run out early.     4. I may be asked to come to the clinic and complete a urine drug test or complete a pill count at any time. If I don t give a urine sample or participate in a pill count, the care team may stop my medicine.    5. I will only receive prescriptions from this clinic for chronic pain. If I am treated by another provider for acute pain issues, I will tell them that I am taking opioid pain medication for chronic pain and that I have a treatment agreement with this provider. I will inform my [facility] care team within one business day if I am given a prescription for any pain medication by another healthcare provider. My [facility] care team can contact other providers and pharmacists about my use of any medicines.    6. It is up to me to make sure that I don t run out of my medicines on weekends or holidays. If my care team is willing to refill my opioid prescription without a visit, I must request refills only during office hours. Refills may take up to 3 business days to process. I will use one pharmacy to fill all my opioid and other controlled substance prescriptions. I will notify the clinic about any changes to my insurance or medication  availability.    7. I am responsible for my prescriptions. If the medicine/prescription is lost, stolen or destroyed, it will not be replaced. I also agree not to share controlled substance medicines with anyone.    8. I am aware I should not use any illegal or recreational drugs. I agree not to drink alcohol unless my care team says I can.       9. If I enroll in the [place] [facility] program, I will tell my care team prior to my next refill.     10. I will tell my care team right away if I become pregnant, have a new medical problem treated outside of my regular clinic, or have a change in my medications.    11. I understand that this medicine can affect my thinking, judgment and reaction time. Alcohol and drugs affect the brain and body, which can affect the safety of my driving. Being under the influence of alcohol or drugs can affect my decision-making, behaviors, personal safety, and the safety of others. Driving while impaired (DWI) can occur if a person is driving, operating, or in physical control of a car, motorcycle, boat, snowmobile, ATV, motorbike, off-road vehicle, or any other motor vehicle ([place] Statute 169A.20). I understand the risk if I choose to drive or operate any vehicle or machinery.    I understand that if I do not follow any of the conditions above, my prescriptions or treatment may be stopped or changed.          Opioids  What You Need to Know    What are opioids?   Opioids are pain medicines that must be prescribed by a doctor. They are also known as narcotics.     Examples are:   1. morphine (MS Contin, Venus)  2. oxycodone (Oxycontin)  3. oxycodone and acetaminophen (Percocet)  4. hydrocodone and acetaminophen (Vicodin, Norco)   5. fentanyl patch (Duragesic)   6. hydromorphone (Dilaudid)   7. methadone  8. codeine (Tylenol #3)     What do opioids do well?   Opioids are best for severe short-term pain such as after a surgery or injury. They may work well for cancer pain. They may  help some people with long-lasting (chronic) pain.     What do opioids NOT do well?   Opioids never get rid of pain entirely, and they don t work well for most patients with chronic pain. Opioids don t reduce swelling, one of the causes of pain.                                    Other ways to manage chronic pain and improve function include:       Treat the health problem that may be causing pain    Anti-inflammation medicines, which reduce swelling and tenderness, such as ibuprofen (Advil, Motrin) or naproxen (Aleve)    Acetaminophen (Tylenol)    Antidepressants and anti-seizure medicines, especially for nerve pain    Topical treatments such as patches or creams    Injections or nerve blocks    Chiropractic or osteopathic treatment    Acupuncture, massage, deep breathing, meditation, visual imagery, aromatherapy    Use heat or ice at the pain site    Physical therapy     Exercise    Stop smoking    Take part in therapy       Risks and side effects     Talk to your doctor before you start or decide to keep taking opioids. Possible side effects include:      Lowering your breathing rate enough to cause death    Overdose, including death, especially if taking higher than prescribed doses    Worse depression symptoms; less pleasure in things you usually enjoy    Feeling tired or sluggish    Slower thoughts or cloudy thinking    Being more sensitive to pain over time; pain is harder to control    Trouble sleeping or restless sleep    Changes in hormone levels (for example, less testosterone)    Changes in sex drive or ability to have sex    Constipation    Unsafe driving    Itching and sweating    Dizziness    Nausea, throwing up and dry mouth    What else should I know about opioids?    Opioids may lead to dependence, tolerance, or addiction.      Dependence means that if you stop or reduce the medicine too quickly, you will have withdrawal symptoms. These include loose poop (diarrhea), jitters, flu-like symptoms,  nervousness and tremors. Dependence is not the same as addiction.                       Tolerance means needing higher doses over time to get the same effect. This may increase the chance of serious side effects.      Addiction is when people improperly use a substance that harms their body, their mind or their relations with others. Use of opiates can cause a relapse of addiction if you have a history of drug or alcohol abuse.      People who have used opioids for a long time may have a lower quality of life, worse depression, higher levels of pain and more visits to doctors.    You can overdose on opioids. Take these steps to lower your risk of overdose:    1. Recognize the signs:  Signs of overdose include decrease or loss of consciousness (blackout), slowed breathing, trouble waking up and blue lips. If someone is worried about overdose, they should call 911.    2. Talk to your doctor about Narcan (naloxone).   If you are at risk for overdose, you may be given a prescription for Narcan. This medicine very quickly reverses the effects of opioids.   If you overdose, a friend or family member can give you Narcan while waiting for the ambulance. They need to know the signs of overdose and how to give Narcan.     3. Don't use alcohol or street drugs.   Taking them with opioids can cause death.    4. Do not take any of these medicines unless your doctor says it s OK. Taking these with opioids can cause death:    Benzodiazepines, such as lorazepam (Ativan), alprazolam (Xanax) or diazepam (Valium)    Muscle relaxers, such as cyclobenzaprine (Flexeril)    Sleeping pills like zolpidem (Ambien)     Other opioids      How to keep you and other people safe while taking opioids:    1. Never share your opioids with others.  Opioid medicines are regulated by the Drug Enforcement Agency (JADEN). Selling or sharing medications is a criminal act.    2. Be sure to store opioids in a secure place, locked up if possible. Young children  can easily swallow them and overdose.    3. When you are traveling with your medicines, keep them in the original bottles. If you use a pill box, be sure you also carry a copy of your medicine list from your clinic or pharmacy.    4. Safe disposal of opioids    Most pharmacies have places to get rid of medicine, called disposal kiosks. Medicine disposal options are also available in every [facility]. Search your county and  medication disposal  to find more options. You can find more details at:  [email or web address]     I agree that my provider, clinic care team, and pharmacy may work with any city, state or federal law enforcement agency that investigates the misuse, sale, or other diversion of my controlled medicine. I will allow my provider to discuss my care with, or share a copy of, this agreement with any other treating provider, pharmacy or emergency room where I receive care.    I have read this agreement and have asked questions about anything I did not understand.    _______________________________________________________  Patient Signature - Paulina Scott _____________________                   Date     _______________________________________________________  Provider Signature - Salima Lockhart MD   _____________________                   Date     _______________________________________________________  Witness Signature (required if provider not present while patient signing)   _____________________                   Date

## 2022-12-27 LAB — GABAPENTIN UR QL CFM: PRESENT

## 2023-01-19 DIAGNOSIS — M00.9 PYOGENIC ARTHRITIS OF RIGHT HAND, DUE TO UNSPECIFIED ORGANISM (H): ICD-10-CM

## 2023-01-19 RX ORDER — HYDROCODONE BITARTRATE AND ACETAMINOPHEN 5; 325 MG/1; MG/1
1 TABLET ORAL EVERY 6 HOURS PRN
Qty: 30 TABLET | Refills: 0 | Status: SHIPPED | OUTPATIENT
Start: 2023-01-19 | End: 2023-02-20

## 2023-02-20 DIAGNOSIS — M00.9 PYOGENIC ARTHRITIS OF RIGHT HAND, DUE TO UNSPECIFIED ORGANISM (H): ICD-10-CM

## 2023-02-20 RX ORDER — HYDROCODONE BITARTRATE AND ACETAMINOPHEN 5; 325 MG/1; MG/1
1 TABLET ORAL EVERY 6 HOURS PRN
Qty: 30 TABLET | Refills: 0 | Status: SHIPPED | OUTPATIENT
Start: 2023-02-20 | End: 2023-03-20

## 2023-03-20 DIAGNOSIS — M00.9 PYOGENIC ARTHRITIS OF RIGHT HAND, DUE TO UNSPECIFIED ORGANISM (H): ICD-10-CM

## 2023-03-20 RX ORDER — HYDROCODONE BITARTRATE AND ACETAMINOPHEN 5; 325 MG/1; MG/1
1 TABLET ORAL EVERY 6 HOURS PRN
Qty: 30 TABLET | Refills: 0 | Status: SHIPPED | OUTPATIENT
Start: 2023-03-20 | End: 2023-04-18

## 2023-03-20 NOTE — TELEPHONE ENCOUNTER
Routing refill request to provider for review/approval because:  Drug not on the [facility] [place] / controlled substance    Last Written Prescription Date:  2/20/23  Last Fill Quantity: 30,  # refills: 0   Last office visit provider:  12/23/22     Requested Prescriptions   Pending Prescriptions Disp Refills     HYDROcodone-acetaminophen (NORCO) 5-325 MG tablet 30 tablet 0     Sig: Take 1 tablet by mouth every 6 hours as needed for severe pain (7-10)       There is no refill protocol information for this order          CRYSTAL AJ RN 03/20/23 2:49 PM

## 2023-04-18 DIAGNOSIS — M00.9 PYOGENIC ARTHRITIS OF RIGHT HAND, DUE TO UNSPECIFIED ORGANISM (H): ICD-10-CM

## 2023-04-18 RX ORDER — HYDROCODONE BITARTRATE AND ACETAMINOPHEN 5; 325 MG/1; MG/1
1 TABLET ORAL EVERY 6 HOURS PRN
Qty: 30 TABLET | Refills: 0 | Status: SHIPPED | OUTPATIENT
Start: 2023-04-18 | End: 2023-05-18

## 2023-05-08 DIAGNOSIS — F32.A DEPRESSION: ICD-10-CM

## 2023-05-10 NOTE — TELEPHONE ENCOUNTER
"Last Written Prescription Date:  5/16/2022  Last Fill Quantity: 90,  # refills: 3  Last office visit provider: 12/23/2022 with PCP Dr CHARMAINE Lockhart    Please check with provider: drug-drug interaction warning with Naprosyn.   Requested Prescriptions   Pending Prescriptions Disp Refills     escitalopram (LEXAPRO) 20 MG tablet 90 tablet 3     Sig: Take 1 tablet (20 mg) by mouth daily       SSRIs Protocol Passed - 5/8/2023  2:30 PM        Passed - PHQ-9 score less than 5 in past 6 months     Please review last PHQ-9 score.           Passed - Medication is active on med list        Passed - Patient is age 18 or older        Passed - No active pregnancy on record        Passed - No positive pregnancy test in last 12 months        Passed - Recent (6 mo) or future (30 days) visit within the authorizing provider's specialty     Patient had office visit in the last 6 months or has a visit in the next 30 days with authorizing provider or within the authorizing provider's specialty.  See \"Patient Info\" tab in inbasket, or \"Choose Columns\" in Meds & Orders section of the refill encounter.                 Whitley Ibrahim RN 05/09/23 9:39 PM  "

## 2023-05-11 RX ORDER — ESCITALOPRAM OXALATE 20 MG/1
20 TABLET ORAL DAILY
Qty: 90 TABLET | Refills: 0 | Status: SHIPPED | OUTPATIENT
Start: 2023-05-11 | End: 2023-08-15

## 2023-05-17 DIAGNOSIS — M00.9 PYOGENIC ARTHRITIS OF RIGHT HAND, DUE TO UNSPECIFIED ORGANISM (H): ICD-10-CM

## 2023-05-18 RX ORDER — HYDROCODONE BITARTRATE AND ACETAMINOPHEN 5; 325 MG/1; MG/1
1 TABLET ORAL EVERY 6 HOURS PRN
Qty: 30 TABLET | Refills: 0 | Status: SHIPPED | OUTPATIENT
Start: 2023-05-18 | End: 2023-06-15

## 2023-06-14 DIAGNOSIS — M00.9 PYOGENIC ARTHRITIS OF RIGHT HAND, DUE TO UNSPECIFIED ORGANISM (H): ICD-10-CM

## 2023-06-15 RX ORDER — HYDROCODONE BITARTRATE AND ACETAMINOPHEN 5; 325 MG/1; MG/1
1 TABLET ORAL EVERY 6 HOURS PRN
Qty: 30 TABLET | Refills: 0 | Status: SHIPPED | OUTPATIENT
Start: 2023-06-15 | End: 2023-07-14

## 2023-06-15 NOTE — TELEPHONE ENCOUNTER
Patient requesting this to be addressed asap and would like a call back once sent.  Please advise.

## 2023-07-14 DIAGNOSIS — M00.9 PYOGENIC ARTHRITIS OF RIGHT HAND, DUE TO UNSPECIFIED ORGANISM (H): ICD-10-CM

## 2023-07-14 RX ORDER — HYDROCODONE BITARTRATE AND ACETAMINOPHEN 5; 325 MG/1; MG/1
1 TABLET ORAL EVERY 6 HOURS PRN
Qty: 30 TABLET | Refills: 0 | Status: SHIPPED | OUTPATIENT
Start: 2023-07-14 | End: 2023-08-11

## 2023-07-19 ENCOUNTER — HOSPITAL ENCOUNTER (EMERGENCY)
Facility: HOSPITAL | Age: 63
Discharge: LEFT BEFORE DISCHARGE | End: 2023-07-19
Payer: COMMERCIAL

## 2023-07-19 VITALS
HEART RATE: 67 BPM | SYSTOLIC BLOOD PRESSURE: 151 MMHG | DIASTOLIC BLOOD PRESSURE: 71 MMHG | BODY MASS INDEX: 20.83 KG/M2 | HEIGHT: 65 IN | WEIGHT: 125 LBS | TEMPERATURE: 98.2 F | OXYGEN SATURATION: 99 % | RESPIRATION RATE: 20 BRPM

## 2023-07-20 NOTE — ED TRIAGE NOTES
"Pt reports redness, swelling and  \"feels like something is crawling around in there\" on her R eyelid.  Pt denies any changes to her vision, no drainage from eye noted.  Pt has hx of eye lid surgery March of this year.       Triage Assessment     Row Name 07/19/23 2056       Triage Assessment (Adult)    Airway WDL WDL       Respiratory WDL    Respiratory WDL WDL       Skin Circulation/Temperature WDL    Skin Circulation/Temperature WDL WDL       Cardiac WDL    Cardiac WDL WDL       Peripheral/Neurovascular WDL    Peripheral Neurovascular WDL WDL       Cognitive/Neuro/Behavioral WDL    Cognitive/Neuro/Behavioral WDL WDL              "

## 2023-07-25 ENCOUNTER — NURSE TRIAGE (OUTPATIENT)
Dept: FAMILY MEDICINE | Facility: CLINIC | Age: 63
End: 2023-07-25

## 2023-07-25 ENCOUNTER — OFFICE VISIT (OUTPATIENT)
Dept: FAMILY MEDICINE | Facility: CLINIC | Age: 63
End: 2023-07-25
Payer: COMMERCIAL

## 2023-07-25 VITALS
RESPIRATION RATE: 18 BRPM | DIASTOLIC BLOOD PRESSURE: 75 MMHG | TEMPERATURE: 97.2 F | HEART RATE: 67 BPM | WEIGHT: 126 LBS | SYSTOLIC BLOOD PRESSURE: 132 MMHG | OXYGEN SATURATION: 99 % | HEIGHT: 65 IN | BODY MASS INDEX: 20.99 KG/M2

## 2023-07-25 DIAGNOSIS — H02.9 EYELID LESION: Primary | ICD-10-CM

## 2023-07-25 DIAGNOSIS — B02.30 HERPES ZOSTER WITH OPHTHALMIC COMPLICATION, UNSPECIFIED HERPES ZOSTER EYE DISEASE: ICD-10-CM

## 2023-07-25 PROCEDURE — 99213 OFFICE O/P EST LOW 20 MIN: CPT | Performed by: FAMILY MEDICINE

## 2023-07-25 RX ORDER — VALACYCLOVIR HYDROCHLORIDE 1 G/1
1000 TABLET, FILM COATED ORAL 3 TIMES DAILY
Qty: 21 TABLET | Refills: 0 | Status: SHIPPED | OUTPATIENT
Start: 2023-07-25 | End: 2023-10-03

## 2023-07-25 ASSESSMENT — PATIENT HEALTH QUESTIONNAIRE - PHQ9
SUM OF ALL RESPONSES TO PHQ QUESTIONS 1-9: 0
SUM OF ALL RESPONSES TO PHQ QUESTIONS 1-9: 0
10. IF YOU CHECKED OFF ANY PROBLEMS, HOW DIFFICULT HAVE THESE PROBLEMS MADE IT FOR YOU TO DO YOUR WORK, TAKE CARE OF THINGS AT HOME, OR GET ALONG WITH OTHER PEOPLE: NOT DIFFICULT AT ALL

## 2023-07-25 ASSESSMENT — ENCOUNTER SYMPTOMS: EYE PAIN: 1

## 2023-07-25 NOTE — PROGRESS NOTES
1. Eyelid lesion  2. Herpes zoster with ophthalmic complication, unspecified herpes zoster eye disease  This is a 62 yo female with redness/sl swelling/and a small open area on right upper eyelid.  Has used multiple medications (as listed in HPI).  But still has numbness/tingling from eyelid and up onto scalp.  This makes me concerned about possibility of herpes zoster.  I am going to add high dose Valacyclovir and will refer to Ophthalmology as soon as possible.  (Patient also notes she is planning to leave town later this week and I want to make sure she's seen before she leaves).    - Adult Eye  Referral; Future  - valACYclovir (VALTREX) 1000 mg tablet; Take 1 tablet (1,000 mg) by mouth 3 times daily for 7 days  Dispense: 21 tablet; Refill: 0          Subjective   Paulina is a 63 year old, presenting for the following health issues:  Eye Problem (Wound on right eye lid not getting better)      7/25/2023    12:47 PM   Additional Questions   Roomed by Bianca     Surgery March 27, 2023   Surgery on eyelid  A week ago - itching -     Called ophthalmology -   Got Rajeev-poly-ophth  Then worse  Got in with ophthalmology -   Got Lotemax ointment   Worse  Then , saw online dermatologist  Desonide cream 0.05%, Doxycycline 100 mg BID  Has Picrolimus    Itching is better, but now has open area on right eye    Going to [place] - Thursday -     Feels some itching up onto forehead, into scalp      History of Present Illness       Reason for visit:  Irritation on right eyelid getting worse  Symptom onset:  1-2 weeks ago  Symptom intensity:  Moderate  Symptom progression:  Worsening  Had these symptoms before:  No    She eats 2-3 servings of fruits and vegetables daily.She consumes 0 sweetened beverage(s) daily.She exercises with enough effort to increase her heart rate 20 to 29 minutes per day.  She exercises with enough effort to increase her heart rate 4 days per week.   She is taking medications regularly.    "            Review of Systems   Constitutional:  Negative for chills and fever.   HENT: Negative.     Eyes:  Positive for pain.   Respiratory:  Negative for cough and shortness of breath.    Cardiovascular:  Negative for chest pain and peripheral edema.   Gastrointestinal: Negative.    Endocrine: Negative for polydipsia and polyuria.   Genitourinary: Negative.    Musculoskeletal: Negative.    Skin:         Right upper eyelid dry skin with mild erythema; has a small ulceration centrally -    Allergic/Immunologic: Negative.    Neurological:  Positive for numbness (tingling from right upper eyelid extending bandlike up over right scalp).   Hematological:  Does not bruise/bleed easily.   Psychiatric/Behavioral: Negative.              Objective    /75 (BP Location: Right arm, Patient Position: Sitting, Cuff Size: Adult Regular)   Pulse 67   Temp 97.2  F (36.2  C) (Temporal)   Resp 18   Ht 1.651 m (5' 5\")   Wt 57.2 kg (126 lb)   LMP  (LMP Unknown)   SpO2 99%   BMI 20.97 kg/m    Body mass index is 20.97 kg/m .  Physical Exam  Vitals reviewed.   Constitutional:       General: She is not in acute distress.     Appearance: Normal appearance.   HENT:      Head: Normocephalic.      Right Ear: Tympanic membrane, ear canal and external ear normal.      Left Ear: Tympanic membrane, ear canal and external ear normal.      Nose: Nose normal.      Mouth/Throat:      Mouth: Mucous membranes are moist.      Pharynx: No posterior oropharyngeal erythema.   Eyes:      Extraocular Movements: Extraocular movements intact.      Conjunctiva/sclera: Conjunctivae normal.      Pupils: Pupils are equal, round, and reactive to light.      Comments: Right upper eyelid - mild erythema with small ulceration (now scabbed) ;    Cardiovascular:      Rate and Rhythm: Normal rate and regular rhythm.      Pulses: Normal pulses.      Heart sounds: Normal heart sounds. No murmur heard.  Pulmonary:      Effort: Pulmonary effort is normal.      " Breath sounds: Normal breath sounds.   Abdominal:      Palpations: Abdomen is soft. There is no mass.      Tenderness: There is no abdominal tenderness. There is no guarding or rebound.   Genitourinary:     Comments: External genitalia normal  Musculoskeletal:         General: No deformity. Normal range of motion.      Cervical back: Normal range of motion and neck supple.   Lymphadenopathy:      Cervical: No cervical adenopathy.   Skin:     General: Skin is warm and dry.   Neurological:      General: No focal deficit present.      Mental Status: She is alert.   Psychiatric:         Mood and Affect: Mood normal.         Behavior: Behavior normal.            No results found for any visits on 07/25/23.

## 2023-07-25 NOTE — TELEPHONE ENCOUNTER
Called and spoke to Paulina,    Assisted with scheduling appointment at [facility].    John Calloway RN     [facility]

## 2023-07-25 NOTE — TELEPHONE ENCOUNTER
I agree that patient needs an in person evaluation for concerns for preseptal cellulitis.    Salima Lockhart MD

## 2023-07-25 NOTE — TELEPHONE ENCOUNTER
Nurse Triage SBAR    Is this a 2nd Level Triage? YES, LICENSED PRACTITIONER REVIEW IS REQUIRED    Situation:     Patient calling in to report concerns over ongoing right upper eyelid swelling. She did an online dermatology consult over the weekend and was prescribed doxycyline orally and desonide cream to spread over the eyelid.    She is concerned because she feels the skin is looking worse.    Background:     Swelling, redness started about 1 week ago.    Assessment:     Patient is reporting that the affected area seems to be getting worse visually, though pain and itchiness have remained consistent. She does not have a fever nor does she have changes in her vision.    Protocol Recommended Disposition:   Go To Office Now    Recommendation: Patient is going on vacation Thursday this week and is worried that the prescribed treatment is not working. She has been unable to contact dermatology provider.     Routed to provider    Does the patient meet one of the following criteria for ADS visit consideration? 16+ years old, with an [facility] PCP     TIP  Providers, please consider if this condition is appropriate for management at one of our Acute and Diagnostic Services sites.     If patient is a good candidate, please use dotphrase <dot>triageresponse and select Refer to ADS to document.        Reason for Disposition   SEVERE eyelid swelling on one side that is red and painful (or tender to touch)    Additional Information   Negative: Chemical got in the eye   Negative: Recent injury to the eye   Negative: Entire face is swollen   Negative: Sacs of clear fluid (blisters) on whites of eyes (allergic cysts)   Negative: Contact with pollen, other allergic substance or eyedrops   Negative: Bee sting and within last 24 hours   Negative: Insect bite suspected   Negative: Yellow or green discharge (pus) in the eye   Negative: Redness of white area (sclera) of eye(s)   Negative: SEVERE eyelid swelling (i.e., shut or almost) and  fever   Negative: Eyelid (outer) is very red and fever   Negative: Pregnant 20 or more weeks and sudden weight gain (e.g., more than 3 lbs or 1.4 kg in one week)   Negative: Postpartum (from 0 to 6 weeks after delivery) and sudden weight gain (e.g., more than 3 lbs or 1.4 kg in one week)   Negative: Patient sounds very sick or weak to the triager   Negative: SEVERE eyelid swelling (i.e., shut or almost) and involves both eyes   Negative: SEVERE eyelid swelling and taking an ACE Inhibitor medication (e.g., benazepril/LOTENSIN, captopril/CAPOTEN, enalapril/VASOTEC, lisinopril/ZESTRIL)    Protocols used: Eye - Swelling-A-[place]

## 2023-07-30 ASSESSMENT — ENCOUNTER SYMPTOMS
MUSCULOSKELETAL NEGATIVE: 1
POLYDIPSIA: 0
COUGH: 0
FEVER: 0
BRUISES/BLEEDS EASILY: 0
ALLERGIC/IMMUNOLOGIC NEGATIVE: 1
CHILLS: 0
NUMBNESS: 1
GASTROINTESTINAL NEGATIVE: 1
SHORTNESS OF BREATH: 0
PSYCHIATRIC NEGATIVE: 1

## 2023-08-11 DIAGNOSIS — M00.9 PYOGENIC ARTHRITIS OF RIGHT HAND, DUE TO UNSPECIFIED ORGANISM (H): ICD-10-CM

## 2023-08-11 RX ORDER — HYDROCODONE BITARTRATE AND ACETAMINOPHEN 5; 325 MG/1; MG/1
1 TABLET ORAL EVERY 6 HOURS PRN
Qty: 30 TABLET | Refills: 0 | Status: SHIPPED | OUTPATIENT
Start: 2023-08-11 | End: 2023-09-11

## 2023-08-15 DIAGNOSIS — F32.A DEPRESSION: ICD-10-CM

## 2023-08-15 RX ORDER — ESCITALOPRAM OXALATE 20 MG/1
20 TABLET ORAL DAILY
Qty: 90 TABLET | Refills: 0 | Status: SHIPPED | OUTPATIENT
Start: 2023-08-15 | End: 2023-11-07

## 2023-08-15 NOTE — TELEPHONE ENCOUNTER
"Routing refill request to provider for review/approval because:  Patient needs to be seen because it has been more than 6 months since last office visit.    Last Written Prescription Date:  5/11/2023 with message due for office visit in June  Last Fill Quantity: 90,  # refills: 0   Last office visit provider:  7/25/2023 with Dr VADIM [facility] for eye,   12/23/2022 with PCP Dr CHARMAINE Lockhart    Requested Prescriptions   Pending Prescriptions Disp Refills    escitalopram (LEXAPRO) 20 MG tablet 90 tablet 0     Sig: Take 1 tablet (20 mg) by mouth daily       SSRIs Protocol Failed - 8/15/2023  1:06 PM        Failed - Recent (6 mo) or future (30 days) visit within the authorizing provider's specialty     Patient had office visit in the last 6 months or has a visit in the next 30 days with authorizing provider or within the authorizing provider's specialty.  See \"Patient Info\" tab in inbasket, or \"Choose Columns\" in Meds & Orders section of the refill encounter.            Passed - PHQ-9 score less than 5 in past 6 months     Please review last PHQ-9 score.           Passed - Medication is active on med list        Passed - Patient is age 18 or older        Passed - No active pregnancy on record        Passed - No positive pregnancy test in last 12 months             Whitley Ibrahim RN 08/15/23 3:06 PM  "

## 2023-09-05 ENCOUNTER — PATIENT OUTREACH (OUTPATIENT)
Dept: CARE COORDINATION | Facility: CLINIC | Age: 63
End: 2023-09-05
Payer: COMMERCIAL

## 2023-09-05 DIAGNOSIS — Z00.00 ANNUAL PHYSICAL EXAM: ICD-10-CM

## 2023-09-06 RX ORDER — GABAPENTIN 100 MG/1
300 CAPSULE ORAL AT BEDTIME
Qty: 270 CAPSULE | Refills: 3 | Status: SHIPPED | OUTPATIENT
Start: 2023-09-06 | End: 2024-07-22

## 2023-09-11 DIAGNOSIS — M00.9 PYOGENIC ARTHRITIS OF RIGHT HAND, DUE TO UNSPECIFIED ORGANISM (H): ICD-10-CM

## 2023-09-11 RX ORDER — HYDROCODONE BITARTRATE AND ACETAMINOPHEN 5; 325 MG/1; MG/1
1 TABLET ORAL EVERY 6 HOURS PRN
Qty: 30 TABLET | Refills: 0 | Status: SHIPPED | OUTPATIENT
Start: 2023-09-11 | End: 2023-10-09

## 2023-09-26 ASSESSMENT — ENCOUNTER SYMPTOMS
CHILLS: 0
FEVER: 0
BREAST MASS: 0
PARESTHESIAS: 0
WEAKNESS: 0
CONSTIPATION: 0
SHORTNESS OF BREATH: 0
FREQUENCY: 0
MYALGIAS: 1
COUGH: 0
DIARRHEA: 0
DIZZINESS: 0
HEADACHES: 0
NERVOUS/ANXIOUS: 0
ARTHRALGIAS: 1
DYSURIA: 0
JOINT SWELLING: 1
ABDOMINAL PAIN: 0
EYE PAIN: 0
HEMATURIA: 0
HEMATOCHEZIA: 0
NAUSEA: 0
PALPITATIONS: 0
SORE THROAT: 0
HEARTBURN: 0

## 2023-09-28 NOTE — TELEPHONE ENCOUNTER
Prior Authorization Request  Who s requesting:  Spouse - Hu  Pharmacy Name and Location: [facility] #[id number]  Medication Name: HYDROcodone-acetaminophen 5-325 mg per tablet  Insurance Plan: [employer]erred One  Insurance Member ID Number:  N/A  Informed patient that prior authorizations can take up to 10 business days for response:   Yes  Okay to leave a detailed message: Yes    Need a quantity PA patient spouse believes.    
Telephone Encounter by Shu Hill at 1/10/2019  1:46 PM     Author: Shu Hill Service: -- Author Type: --    Filed: 1/10/2019  1:52 PM Encounter Date: 1/9/2019 Status: Signed    : Shu Hill APPROVED:    Approval start date:01/04/2019  Approval end date:07/04/2019    Left message for patient that medication was approved.              
Telephone Encounter by Shu Hill at 1/9/2019 10:17 AM     Author: Shu Hill Service: -- Author Type: --    Filed: 1/9/2019 10:18 AM Encounter Date: 1/9/2019 Status: Signed    : Shu Hill       [facility] team  [phone]    PA has been initiated.              
Home

## 2023-10-02 ENCOUNTER — TELEPHONE (OUTPATIENT)
Dept: FAMILY MEDICINE | Facility: CLINIC | Age: 63
End: 2023-10-02
Payer: COMMERCIAL

## 2023-10-02 NOTE — TELEPHONE ENCOUNTER
General Call    Contacts         Type Contact Phone/Fax    10/02/2023 08:47 AM CDT Phone (Incoming) Paulina Scott (Self) [phone] (M)          Reason for Call:  Patient called in to check on her status for her upcoming appointment and for what labs she might have pulled for tomorrow and if she needs to fast for these. I told her that she would need to fast because she has not taken regular labs in two years so she will want to pull these tomorrow.     Date of last appointment with provider: 12-    Could we send this information to you in [facility] or would you prefer to receive a phone call?:   Patient would prefer a phone call   Okay to leave a detailed message?: Yes at Cell number on file:    Telephone Information:   Mobile [phone]   Mobile [phone]

## 2023-10-03 ENCOUNTER — ANCILLARY PROCEDURE (OUTPATIENT)
Dept: MAMMOGRAPHY | Facility: HOSPITAL | Age: 63
End: 2023-10-03
Attending: FAMILY MEDICINE
Payer: COMMERCIAL

## 2023-10-03 ENCOUNTER — LAB (OUTPATIENT)
Dept: FAMILY MEDICINE | Facility: CLINIC | Age: 63
End: 2023-10-03

## 2023-10-03 ENCOUNTER — OFFICE VISIT (OUTPATIENT)
Dept: FAMILY MEDICINE | Facility: CLINIC | Age: 63
End: 2023-10-03
Payer: COMMERCIAL

## 2023-10-03 VITALS
HEART RATE: 61 BPM | DIASTOLIC BLOOD PRESSURE: 68 MMHG | RESPIRATION RATE: 18 BRPM | BODY MASS INDEX: 20.96 KG/M2 | SYSTOLIC BLOOD PRESSURE: 120 MMHG | OXYGEN SATURATION: 99 % | WEIGHT: 125.8 LBS | HEIGHT: 65 IN

## 2023-10-03 DIAGNOSIS — G89.29 CHRONIC BILATERAL LOW BACK PAIN WITH RIGHT-SIDED SCIATICA: ICD-10-CM

## 2023-10-03 DIAGNOSIS — Z12.31 VISIT FOR SCREENING MAMMOGRAM: ICD-10-CM

## 2023-10-03 DIAGNOSIS — Z12.11 SCREEN FOR COLON CANCER: ICD-10-CM

## 2023-10-03 DIAGNOSIS — Z79.899 CONTROLLED SUBSTANCE AGREEMENT SIGNED: ICD-10-CM

## 2023-10-03 DIAGNOSIS — Z13.220 SCREENING FOR LIPID DISORDERS: ICD-10-CM

## 2023-10-03 DIAGNOSIS — M54.41 CHRONIC BILATERAL LOW BACK PAIN WITH RIGHT-SIDED SCIATICA: ICD-10-CM

## 2023-10-03 DIAGNOSIS — Z00.00 ROUTINE GENERAL MEDICAL EXAMINATION AT A HEALTH CARE FACILITY: Primary | ICD-10-CM

## 2023-10-03 DIAGNOSIS — Z79.890 ON HORMONE REPLACEMENT THERAPY: ICD-10-CM

## 2023-10-03 LAB
AMPHETAMINES UR QL SCN: NORMAL
BARBITURATES UR QL SCN: NORMAL
BENZODIAZ UR QL SCN: NORMAL
BZE UR QL SCN: NORMAL
CANNABINOIDS UR QL SCN: NORMAL
FENTANYL UR QL: NORMAL
OPIATES UR QL SCN: NORMAL
PCP QUAL URINE (ROCHE): NORMAL

## 2023-10-03 PROCEDURE — 77067 SCR MAMMO BI INCL CAD: CPT

## 2023-10-03 PROCEDURE — 99213 OFFICE O/P EST LOW 20 MIN: CPT | Mod: 25 | Performed by: FAMILY MEDICINE

## 2023-10-03 PROCEDURE — 80061 LIPID PANEL: CPT | Performed by: FAMILY MEDICINE

## 2023-10-03 PROCEDURE — 80053 COMPREHEN METABOLIC PANEL: CPT | Performed by: FAMILY MEDICINE

## 2023-10-03 PROCEDURE — 99396 PREV VISIT EST AGE 40-64: CPT | Performed by: FAMILY MEDICINE

## 2023-10-03 PROCEDURE — 36415 COLL VENOUS BLD VENIPUNCTURE: CPT | Performed by: FAMILY MEDICINE

## 2023-10-03 PROCEDURE — 80307 DRUG TEST PRSMV CHEM ANLYZR: CPT | Performed by: FAMILY MEDICINE

## 2023-10-03 ASSESSMENT — ENCOUNTER SYMPTOMS
HEADACHES: 0
SORE THROAT: 0
HEMATOCHEZIA: 0
WEAKNESS: 0
ARTHRALGIAS: 1
EYE PAIN: 0
CONSTIPATION: 0
PALPITATIONS: 0
JOINT SWELLING: 1
NAUSEA: 0
CHILLS: 0
COUGH: 0
ABDOMINAL PAIN: 0
DIZZINESS: 0
FEVER: 0
DIARRHEA: 0
HEMATURIA: 0
PARESTHESIAS: 0
BREAST MASS: 0
NERVOUS/ANXIOUS: 0
DYSURIA: 0
MYALGIAS: 1
SHORTNESS OF BREATH: 0
FREQUENCY: 0
HEARTBURN: 0

## 2023-10-03 NOTE — LETTER
Opioid / Opioid Plus Controlled Substance Agreement    This is an agreement between you and your provider about the safe and appropriate use of controlled substance/opioids prescribed by your care team. Controlled substances are medicines that can cause physical and mental dependence (abuse).    There are strict laws about having and using these medicines. We here at [facility] are committing to working with you in your efforts to get better. To support you in this work, we ll help you schedule regular office appointments for medicine refills. If we must cancel or change your appointment for any reason, we ll make sure you have enough medicine to last until your next appointment.     As a Provider, I will:  Listen carefully to your concerns and treat you with respect.   Recommend a treatment plan that I believe is in your best interest. This plan may involve therapies other than opioid pain medication.   Talk with you often about the possible benefits, and the risk of harm of any medicine that we prescribe for you.   Provide a plan on how to taper (discontinue or go off) using this medicine if the decision is made to stop its use.    As a Patient, I understand that opioid(s):   Are a controlled substance prescribed by my care team to help me function or work and manage my condition(s).   Are strong medicines and can cause serious side effects such as:  Drowsiness, which can seriously affect my driving ability  A lower breathing rate, enough to cause death  Harm to my thinking ability   Depression   Abuse of and addiction to this medicine  Need to be taken exactly as prescribed. Combining opioids with certain medicines or chemicals (such as illegal drugs, sedatives, sleeping pills, and benzodiazepines) can be dangerous or even fatal. If I stop opioids suddenly, I may have severe withdrawal symptoms.  Do not work for all types of pain nor for all patients. If they re not helpful, I may be asked to stop  them.        The risks, benefits and side effects of these medicine(s) were explained to me. I agree that:  I will take part in other treatments as advised by my care team. This may be psychiatry or counseling, physical therapy, behavioral therapy, group treatment or a referral to a specialist.     I will keep all my appointments. I understand that this is part of the monitoring of opioids. My care team may require an office visit for EVERY opioid/controlled substance refill. If I miss appointments or don t follow instructions, my care team may stop my medicine.    I will take my medicines as prescribed. I will not change the dose or schedule unless my care team tells me to. There will be no refills if I run out early.     I may be asked to come to the clinic and complete a urine drug test or complete a pill count at any time. If I don t give a urine sample or participate in a pill count, the care team may stop my medicine.    I will only receive prescriptions from this clinic for chronic pain. If I am treated by another provider for acute pain issues, I will tell them that I am taking opioid pain medication for chronic pain and that I have a treatment agreement with this provider. I will inform my [facility] care team within one business day if I am given a prescription for any pain medication by another healthcare provider. My [facility] care team can contact other providers and pharmacists about my use of any medicines.    It is up to me to make sure that I don t run out of my medicines on weekends or holidays. If my care team is willing to refill my opioid prescription without a visit, I must request refills only during office hours. Refills may take up to 3 business days to process. I will use one pharmacy to fill all my opioid and other controlled substance prescriptions. I will notify the clinic about any changes to my insurance or medication availability.    I am responsible for my  prescriptions. If the medicine/prescription is lost, stolen or destroyed, it will not be replaced. I also agree not to share controlled substance medicines with anyone.    I am aware I should not use any illegal or recreational drugs. I agree not to drink alcohol unless my care team says I can.       If I enroll in the [place] [facility] Cannabis program, I will tell my care team prior to my next refill.     I will tell my care team right away if I become pregnant, have a new medical problem treated outside of my regular clinic, or have a change in my medications.    I understand that this medicine can affect my thinking, judgment and reaction time. Alcohol and drugs affect the brain and body, which can affect the safety of my driving. Being under the influence of alcohol or drugs can affect my decision-making, behaviors, personal safety, and the safety of others. Driving while impaired (DWI) can occur if a person is driving, operating, or in physical control of a car, motorcycle, boat, snowmobile, ATV, motorbike, off-road vehicle, or any other motor vehicle ([place] Statute 169A.20). I understand the risk if I choose to drive or operate any vehicle or machinery.    I understand that if I do not follow any of the conditions above, my prescriptions or treatment may be stopped or changed.          Opioids  What You Need to Know    What are opioids?   Opioids are pain medicines that must be prescribed by a doctor. They are also known as narcotics.     Examples are:   morphine (MS Contin, Venus)  oxycodone (Oxycontin)  oxycodone and acetaminophen (Percocet)  hydrocodone and acetaminophen (Vicodin, Norco)   fentanyl patch (Duragesic)   hydromorphone (Dilaudid)   methadone  codeine (Tylenol #3)     What do opioids do well?   Opioids are best for severe short-term pain such as after a surgery or injury. They may work well for cancer pain. They may help some people with long-lasting (chronic) pain.     What do opioids NOT do  well?   Opioids never get rid of pain entirely, and they don t work well for most patients with chronic pain. Opioids don t reduce swelling, one of the causes of pain.                                    Other ways to manage chronic pain and improve function include:     Treat the health problem that may be causing pain  Anti-inflammation medicines, which reduce swelling and tenderness, such as ibuprofen (Advil, Motrin) or naproxen (Aleve)  Acetaminophen (Tylenol)  Antidepressants and anti-seizure medicines, especially for nerve pain  Topical treatments such as patches or creams  Injections or nerve blocks  Chiropractic or osteopathic treatment  Acupuncture, massage, deep breathing, meditation, visual imagery, aromatherapy  Use heat or ice at the pain site  Physical therapy   Exercise  Stop smoking  Take part in therapy       Risks and side effects     Talk to your doctor before you start or decide to keep taking opioids. Possible side effects include:    Lowering your breathing rate enough to cause death  Overdose, including death, especially if taking higher than prescribed doses  Worse depression symptoms; less pleasure in things you usually enjoy  Feeling tired or sluggish  Slower thoughts or cloudy thinking  Being more sensitive to pain over time; pain is harder to control  Trouble sleeping or restless sleep  Changes in hormone levels (for example, less testosterone)  Changes in sex drive or ability to have sex  Constipation  Unsafe driving  Itching and sweating  Dizziness  Nausea, throwing up and dry mouth    What else should I know about opioids?    Opioids may lead to dependence, tolerance, or addiction.    Dependence means that if you stop or reduce the medicine too quickly, you will have withdrawal symptoms. These include loose poop (diarrhea), jitters, flu-like symptoms, nervousness and tremors. Dependence is not the same as addiction.                     Tolerance means needing higher doses over time to  get the same effect. This may increase the chance of serious side effects.    Addiction is when people improperly use a substance that harms their body, their mind or their relations with others. Use of opiates can cause a relapse of addiction if you have a history of drug or alcohol abuse.    People who have used opioids for a long time may have a lower quality of life, worse depression, higher levels of pain and more visits to doctors.    You can overdose on opioids. Take these steps to lower your risk of overdose:    Recognize the signs:  Signs of overdose include decrease or loss of consciousness (blackout), slowed breathing, trouble waking up and blue lips. If someone is worried about overdose, they should call 911.    Talk to your doctor about Narcan (naloxone).   If you are at risk for overdose, you may be given a prescription for Narcan. This medicine very quickly reverses the effects of opioids.   If you overdose, a friend or family member can give you Narcan while waiting for the ambulance. They need to know the signs of overdose and how to give Narcan.     Don't use alcohol or street drugs.   Taking them with opioids can cause death.    Do not take any of these medicines unless your doctor says it s OK. Taking these with opioids can cause death:  Benzodiazepines, such as lorazepam (Ativan), alprazolam (Xanax) or diazepam (Valium)  Muscle relaxers, such as cyclobenzaprine (Flexeril)  Sleeping pills like zolpidem (Ambien)   Other opioids      How to keep you and other people safe while taking opioids:    Never share your opioids with others.  Opioid medicines are regulated by the Drug Enforcement Agency (JADEN). Selling or sharing medications is a criminal act.    2. Be sure to store opioids in a secure place, locked up if possible. Young children can easily swallow them and overdose.    3. When you are traveling with your medicines, keep them in the original bottles. If you use a pill box, be sure you also  carry a copy of your medicine list from your clinic or pharmacy.    4. Safe disposal of opioids    Most pharmacies have places to get rid of medicine, called disposal kiosks. Medicine disposal options are also available in every [facility]. Search your county and  medication disposal  to find more options. You can find more details at:  [email or web address]://[email or web address]-green/managing-unwanted-medications     I agree that my provider, clinic care team, and pharmacy may work with any city, state or federal law enforcement agency that investigates the misuse, sale, or other diversion of my controlled medicine. I will allow my provider to discuss my care with, or share a copy of, this agreement with any other treating provider, pharmacy or emergency room where I receive care.    I have read this agreement and have asked questions about anything I did not understand.    _______________________________________________________  Patient Signature - Paulina Scott _____________________                   Date     _______________________________________________________  Provider Signature - Salima Lockhart MD   _____________________                   Date     _______________________________________________________  Witness Signature (required if provider not present while patient signing)   _____________________                   Date

## 2023-10-03 NOTE — PROGRESS NOTES
ASSESSMENT/PLAN:       ICD-10-CM    1. Routine general medical examination at a health care facility  Z00.00 Comprehensive metabolic panel (BMP + Alb, Alk Phos, ALT, AST, Total. Bili, TP)     Comprehensive metabolic panel (BMP + Alb, Alk Phos, ALT, AST, Total. Bili, TP)      2. Screen for colon cancer  Z12.11       3. Screening for lipid disorders  Z13.220 Lipid panel     Lipid panel      4. Controlled substance agreement signed  Z79.899 Urine Drugs of Abuse Screen     Urine Drugs of Abuse Screen      5. On hormone replacement therapy  Z79.890       6. Chronic bilateral low back pain with right-sided sciatica  M54.41     G89.29         Patient is here today for routine exam.  She has chronic bilateral low back pain and in the past has followed with spine specialist in [place].  Currently goes to chiropractor.  Controlled substance agreement done.  She is on 30 pills of hydrocodone per month  Note she is also on HRT through her OB/GYN.    COUNSELING:  Reviewed preventive health counseling, as reflected in patient instructions       Regular exercise       Healthy diet/nutrition       Colorectal Cancer Screening        She reports that she has never smoked. She has never used smokeless tobacco.     SUBJECTIVE:   CC: Paulina is an 63 year old who presents for preventive health visit.       10/3/2023     8:27 AM   Additional Questions   Roomed by Kellei Ortiz CMA       Healthy Habits:     Getting at least 3 servings of Calcium per day:  Yes    Bi-annual eye exam:  Yes    Dental care twice a year:  Yes    Sleep apnea or symptoms of sleep apnea:  None    Diet:  Regular (no restrictions)    Frequency of exercise:  4-5 days/week    Duration of exercise:  30-45 minutes    Taking medications regularly:  Yes    Barriers to taking medications:  None    Medication side effects:  None    Additional concerns today:  Yes      Social History     Tobacco Use    Smoking status: Never    Smokeless tobacco: Never   Substance Use Topics     Alcohol use: Yes             9/26/2023     1:53 PM   Alcohol Use   Prescreen: >3 drinks/day or >7 drinks/week? No          No data to display              Reviewed orders with patient.  Reviewed health maintenance and updated orders accordingly - Yes  BP Readings from Last 3 Encounters:   10/03/23 120/68   07/25/23 132/75   07/19/23 (!) 151/71    Wt Readings from Last 3 Encounters:   10/03/23 57.1 kg (125 lb 12.8 oz)   07/25/23 57.2 kg (126 lb)   07/19/23 56.7 kg (125 lb)                  Patient Active Problem List   Diagnosis    Congenital absence of vagina    Chronic bilateral low back pain with right-sided sciatica    Raynaud's disease without gangrene    Chronic constipation    Pain of right hand    Narcotic drug use- For chronic back pain- # 30 pills /  month CSA/ [facility]- 08/2021    Controlled substance agreement signed    Abnormal mammogram    Polyarthralgia    Anxiety    PONV (postoperative nausea and vomiting)    On hormone replacement therapy    Backache    Osteoporosis     Past Surgical History:   Procedure Laterality Date    BACK SURGERY      HIP SURGERY      PICC  10/25/2020         REMOVE HARDWARE LOWER EXTREMITY  10/22/2020    Procedure: WITH REMOVAL OF METACARPOPHALANGEAL JOINT REPLACEMENT, PLACEMENT OF ANTIBIOTIC SPACER;  Surgeon: Reagan Dumont MD;  Location: [facility];  Service: Orthopedics       Social History     Tobacco Use    Smoking status: Never    Smokeless tobacco: Never   Substance Use Topics    Alcohol use: Yes     Family History   Problem Relation Age of Onset    Dementia Mother         86    Cancer Mother     Breast Cancer Mother 60.00    Cancer Maternal Grandmother     Cancer Paternal Grandmother          Current Outpatient Medications   Medication Sig Dispense Refill    biotin 5 mg Tab [BIOTIN 5 MG TAB] Take 5 mg by mouth daily.      calcium carbonate (OS-MARISSA) 500 mg calcium (1,250 mg) chewable tablet [CALCIUM CARBONATE (OS-MARISSA) 500 MG CALCIUM (1,250 MG) CHEWABLE TABLET]  Chew 2 tablets daily.       cholecalciferol, vitamin D3, 5,000 unit Tab [CHOLECALCIFEROL, VITAMIN D3, 5,000 UNIT TAB] Take 5,000 Units by mouth daily.      docusate sodium (COLACE) 100 MG capsule [DOCUSATE SODIUM (COLACE) 100 MG CAPSULE] Take 300 mg by mouth every evening.      escitalopram (LEXAPRO) 20 MG tablet Take 1 tablet (20 mg) by mouth daily 90 tablet 0    estradioL (VIVELLE-DOT) 0.05 mg/24 hr [ESTRADIOL (VIVELLE-DOT) 0.05 MG/24 HR] Place 1 patch on the skin 2 (two) times a week. Sundays and Thursdays      gabapentin (NEURONTIN) 100 MG capsule Take 3 capsules (300 mg) by mouth At Bedtime 270 capsule 3    glucosamine/chondr wright A sod (OSTEO BI-FLEX ORAL) [GLUCOSAMINE/CHONDR WRIGHT A SOD (OSTEO BI-FLEX ORAL)] Take 1 tablet by mouth daily.      HYDROcodone-acetaminophen (NORCO) 5-325 MG tablet Take 1 tablet by mouth every 6 hours as needed for severe pain 30 tablet 0    lysine 500 mg Tab [LYSINE 500 MG TAB] Take 500 mg by mouth daily.      mv-min/iron/folic/calcium/vitK (WOMEN'S 50 PLUS MULTIVIT-IRON ORAL) [MV-MIN/IRON/FOLIC/CALCIUM/VITK (WOMEN'S 50 PLUS MULTIVIT-IRON ORAL)] Take 1 tablet by mouth daily.      naproxen sodium (ALEVE) 220 MG tablet [NAPROXEN SODIUM (ALEVE) 220 MG TABLET] Take 660 mg by mouth every 12 (twelve) hours as needed for pain.      peg 400-propylene glycol (SYSTANE, PROPYLENE GLYCOL,) 0.4-0.3 % Drop [-PROPYLENE GLYCOL (SYSTANE, PROPYLENE GLYCOL,) 0.4-0.3 % DROP] Administer 1 drop to both eyes 4 (four) times a day as needed.      TURMERIC ORAL [TURMERIC ORAL] Take 1 capsule by mouth daily.         Breast Cancer Screening:    FHS-7:       10/5/2021     7:29 AM 9/26/2023     1:54 PM   Breast CA Risk Assessment (FHS-7)   Did any of your first-degree relatives have breast or ovarian cancer? Yes Yes   Did any of your relatives have bilateral breast cancer? No No   Did any man in your family have breast cancer? No No   Did any woman in your family have breast and ovarian cancer? Yes No   Did  any woman in your family have breast cancer before age 50 y? No No   Do you have 2 or more relatives with breast and/or ovarian cancer? No No   Do you have 2 or more relatives with breast and/or bowel cancer? No No     Mom had breast CA at an older age.  She defers genetic counseling for now.    Mammogram Screening: Recommended mammography every 1-2 years with patient discussion and risk factor consideration  Pertinent mammograms are reviewed under the imaging tab.    History of abnormal Pap smear: NO - age 30-65 PAP every 5 years with negative HPV co-testing recommended      Latest Ref Rng & Units 8/2/2022     1:47 PM 4/3/2018    12:00 AM 6/14/2016    12:00 AM   PAP / HPV   PAP  Unsatisfactory for evaluation      HPV 16 DNA Negative Negative      HPV 18 DNA Negative Negative      HPV_EXT - HISTORICAL   See Scanned Report  See Scanned Report    Other HR HPV Negative Negative        Reviewed and updated as needed this visit by clinical staff   Tobacco  Allergies  Meds  Problems  Med Hx  Surg Hx  Fam Hx          Reviewed and updated as needed this visit by Provider   Tobacco  Allergies  Meds  Problems  Med Hx  Surg Hx  Fam Hx         Past Medical History:   Diagnosis Date    Chronic bilateral low back pain with right-sided sciatica 1/4/2019    Chronic constipation 1/4/2019    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    Osteoarthritis     Pain of right hand 1/4/2019    Raynaud's disease without gangrene 1/4/2019      Past Surgical History:   Procedure Laterality Date    BACK SURGERY      HIP SURGERY      PICC  10/25/2020         REMOVE HARDWARE LOWER EXTREMITY  10/22/2020    Procedure: WITH REMOVAL OF METACARPOPHALANGEAL JOINT REPLACEMENT, PLACEMENT OF ANTIBIOTIC SPACER;  Surgeon: Reagan Dumont MD;  Location: [facility];  Service: Orthopedics       Review of Systems   Constitutional:  Negative for chills and fever.   HENT:  Negative for congestion, ear pain, hearing loss and sore throat.    Eyes:   "Negative for pain and visual disturbance.   Respiratory:  Negative for cough and shortness of breath.    Cardiovascular:  Negative for chest pain, palpitations and peripheral edema.   Gastrointestinal:  Negative for abdominal pain, constipation, diarrhea, heartburn, hematochezia and nausea.   Breasts:  Negative for tenderness, breast mass and discharge.   Genitourinary:  Negative for dysuria, frequency, genital sores, hematuria, pelvic pain, urgency, vaginal bleeding and vaginal discharge.   Musculoskeletal:  Positive for arthralgias, joint swelling and myalgias.   Skin:  Negative for rash.   Neurological:  Negative for dizziness, weakness, headaches and paresthesias.   Psychiatric/Behavioral:  Negative for mood changes. The patient is not nervous/anxious.           OBJECTIVE:   /68   Pulse 61   Resp 18   Ht 1.651 m (5' 5\")   Wt 57.1 kg (125 lb 12.8 oz)   LMP  (LMP Unknown)   SpO2 99%   BMI 20.93 kg/m    Physical Exam  GENERAL: healthy, alert and no distress  EYES: Eyes grossly normal to inspection, PERRL and conjunctivae and sclerae normal  HENT: ear canals and TM's normal, nose and mouth without ulcers or lesions  NECK: no adenopathy, no asymmetry, masses, or scars and thyroid normal to palpation  RESP: lungs clear to auscultation - no rales, rhonchi or wheezes  BREAST: no palpable axillary masses or adenopathy and fibrocystic changes bilateral  CV: regular rate and rhythm, normal S1 S2, no S3 or S4, no murmur, click or rub, no peripheral edema and peripheral pulses strong  ABDOMEN: soft, nontender, no hepatosplenomegaly, no masses and bowel sounds normal  MS: no gross musculoskeletal defects noted, no edema    Diagnostic Test Results:  Labs reviewed in Epic  Results for orders placed or performed in visit on 10/03/23 (from the past 24 hour(s))   Urine Drugs of Abuse Screen    Narrative    The following orders were created for panel order Urine Drugs of Abuse Screen.  Procedure                         "       Abnormality         Status                     ---------                               -----------         ------                     Drug Abuse Screen Qual U...[[id number]]                      In process                   Please view results for these tests on the individual orders.           Salima Lockhart MD  [facility]

## 2023-10-04 LAB
ALBUMIN SERPL BCG-MCNC: 4.5 G/DL (ref 3.5–5.2)
ALP SERPL-CCNC: 76 U/L (ref 35–104)
ALT SERPL W P-5'-P-CCNC: <5 U/L (ref 0–50)
ANION GAP SERPL CALCULATED.3IONS-SCNC: 8 MMOL/L (ref 7–15)
AST SERPL W P-5'-P-CCNC: 32 U/L (ref 0–45)
BILIRUB SERPL-MCNC: 0.2 MG/DL
BUN SERPL-MCNC: 13.6 MG/DL (ref 8–23)
CALCIUM SERPL-MCNC: 9.4 MG/DL (ref 8.8–10.2)
CHLORIDE SERPL-SCNC: 106 MMOL/L (ref 98–107)
CHOLEST SERPL-MCNC: 234 MG/DL
CREAT SERPL-MCNC: 0.7 MG/DL (ref 0.51–0.95)
DEPRECATED HCO3 PLAS-SCNC: 25 MMOL/L (ref 22–29)
EGFRCR SERPLBLD CKD-EPI 2021: >90 ML/MIN/1.73M2
GLUCOSE SERPL-MCNC: 105 MG/DL (ref 70–99)
HDLC SERPL-MCNC: 84 MG/DL
LDLC SERPL CALC-MCNC: 126 MG/DL
NONHDLC SERPL-MCNC: 150 MG/DL
POTASSIUM SERPL-SCNC: 5.3 MMOL/L (ref 3.4–5.3)
PROT SERPL-MCNC: 7.1 G/DL (ref 6.4–8.3)
SODIUM SERPL-SCNC: 139 MMOL/L (ref 135–145)
TRIGL SERPL-MCNC: 122 MG/DL

## 2023-10-09 DIAGNOSIS — M00.9 PYOGENIC ARTHRITIS OF RIGHT HAND, DUE TO UNSPECIFIED ORGANISM (H): ICD-10-CM

## 2023-10-09 RX ORDER — HYDROCODONE BITARTRATE AND ACETAMINOPHEN 5; 325 MG/1; MG/1
1 TABLET ORAL EVERY 6 HOURS PRN
Qty: 30 TABLET | Refills: 0 | Status: SHIPPED | OUTPATIENT
Start: 2023-10-09 | End: 2023-11-07

## 2023-10-24 LAB — NONINV COLON CA DNA+OCC BLD SCRN STL QL: NEGATIVE

## 2023-11-07 DIAGNOSIS — M00.9 PYOGENIC ARTHRITIS OF RIGHT HAND, DUE TO UNSPECIFIED ORGANISM (H): ICD-10-CM

## 2023-11-07 DIAGNOSIS — F32.A DEPRESSION: ICD-10-CM

## 2023-11-07 RX ORDER — ESCITALOPRAM OXALATE 20 MG/1
20 TABLET ORAL DAILY
Qty: 90 TABLET | Refills: 0 | Status: SHIPPED | OUTPATIENT
Start: 2023-11-07 | End: 2024-02-19

## 2023-11-07 RX ORDER — HYDROCODONE BITARTRATE AND ACETAMINOPHEN 5; 325 MG/1; MG/1
1 TABLET ORAL EVERY 6 HOURS PRN
Qty: 30 TABLET | Refills: 0 | Status: SHIPPED | OUTPATIENT
Start: 2023-11-07 | End: 2023-12-06

## 2023-12-06 DIAGNOSIS — M00.9 PYOGENIC ARTHRITIS OF RIGHT HAND, DUE TO UNSPECIFIED ORGANISM (H): ICD-10-CM

## 2023-12-07 RX ORDER — HYDROCODONE BITARTRATE AND ACETAMINOPHEN 5; 325 MG/1; MG/1
1 TABLET ORAL EVERY 6 HOURS PRN
Qty: 30 TABLET | Refills: 0 | Status: SHIPPED | OUTPATIENT
Start: 2023-12-07 | End: 2024-01-04

## 2024-01-04 DIAGNOSIS — M00.9 PYOGENIC ARTHRITIS OF RIGHT HAND, DUE TO UNSPECIFIED ORGANISM (H): ICD-10-CM

## 2024-01-04 RX ORDER — HYDROCODONE BITARTRATE AND ACETAMINOPHEN 5; 325 MG/1; MG/1
1 TABLET ORAL EVERY 6 HOURS PRN
Qty: 30 TABLET | Refills: 0 | Status: SHIPPED | OUTPATIENT
Start: 2024-01-04 | End: 2024-02-02

## 2024-01-04 NOTE — TELEPHONE ENCOUNTER
Medication Request  Medication name:   HYDROcodone-acetaminophen (NORCO) 5-325 MG tablet   Patient Sig: Take 1 tablet by mouth every 6 hours as needed for severe pain     Requested Pharmacy: [id number]  When was patient last seen for this?:  10/03/23  Patient offered appointment:  02/26/24  Okay to leave a detailed message: yes

## 2024-02-02 DIAGNOSIS — M00.9 PYOGENIC ARTHRITIS OF RIGHT HAND, DUE TO UNSPECIFIED ORGANISM (H): ICD-10-CM

## 2024-02-02 RX ORDER — HYDROCODONE BITARTRATE AND ACETAMINOPHEN 5; 325 MG/1; MG/1
1 TABLET ORAL EVERY 6 HOURS PRN
Qty: 30 TABLET | Refills: 0 | Status: SHIPPED | OUTPATIENT
Start: 2024-02-02 | End: 2024-02-26

## 2024-02-19 DIAGNOSIS — F32.A DEPRESSION: ICD-10-CM

## 2024-02-19 RX ORDER — ESCITALOPRAM OXALATE 20 MG/1
20 TABLET ORAL DAILY
Qty: 90 TABLET | Refills: 0 | Status: SHIPPED | OUTPATIENT
Start: 2024-02-19 | End: 2024-05-13

## 2024-02-26 ENCOUNTER — OFFICE VISIT (OUTPATIENT)
Dept: FAMILY MEDICINE | Facility: CLINIC | Age: 64
End: 2024-02-26
Payer: COMMERCIAL

## 2024-02-26 VITALS
DIASTOLIC BLOOD PRESSURE: 68 MMHG | RESPIRATION RATE: 16 BRPM | WEIGHT: 127.2 LBS | SYSTOLIC BLOOD PRESSURE: 140 MMHG | BODY MASS INDEX: 21.19 KG/M2 | HEIGHT: 65 IN | OXYGEN SATURATION: 96 % | HEART RATE: 63 BPM

## 2024-02-26 DIAGNOSIS — H93.11 TINNITUS OF RIGHT EAR: ICD-10-CM

## 2024-02-26 DIAGNOSIS — M54.41 CHRONIC BILATERAL LOW BACK PAIN WITH RIGHT-SIDED SCIATICA: ICD-10-CM

## 2024-02-26 DIAGNOSIS — G89.29 CHRONIC BILATERAL LOW BACK PAIN WITH RIGHT-SIDED SCIATICA: ICD-10-CM

## 2024-02-26 DIAGNOSIS — M25.552 HIP PAIN, LEFT: ICD-10-CM

## 2024-02-26 DIAGNOSIS — Z78.0 POSTMENOPAUSAL STATUS: ICD-10-CM

## 2024-02-26 DIAGNOSIS — F11.90 CHRONIC, CONTINUOUS USE OF OPIOIDS: Primary | ICD-10-CM

## 2024-02-26 PROCEDURE — 99214 OFFICE O/P EST MOD 30 MIN: CPT | Performed by: FAMILY MEDICINE

## 2024-02-26 RX ORDER — HYDROCODONE BITARTRATE AND ACETAMINOPHEN 5; 325 MG/1; MG/1
1 TABLET ORAL EVERY 6 HOURS PRN
Qty: 30 TABLET | Refills: 0 | Status: SHIPPED | OUTPATIENT
Start: 2024-02-26 | End: 2024-03-25

## 2024-02-26 RX ORDER — FLUTICASONE PROPIONATE 50 MCG
2 SPRAY, SUSPENSION (ML) NASAL
COMMUNITY

## 2024-02-26 RX ORDER — DESONIDE 0.5 MG/G
CREAM TOPICAL
COMMUNITY
End: 2024-03-25

## 2024-02-26 RX ORDER — PIMECROLIMUS 10 MG/G
CREAM TOPICAL
COMMUNITY
End: 2024-03-25

## 2024-02-26 NOTE — PROGRESS NOTES
Assessment & Plan     ICD-10-CM    1. Chronic, continuous use of opioids  F11.90 HYDROcodone-acetaminophen (NORCO) 5-325 MG tablet      2. Chronic bilateral low back pain with right-sided sciatica  M54.41     G89.29       3. Postmenopausal status  Z78.0 DX Hip/Pelvis/Spine      4. Tinnitus of right ear  H93.11 HEARING SCREENING     Adult ENT  Referral      5. Hip pain, left  M25.552         Patient is here today for opioid follow-up.  She uses opioids sparingly for her low back pain.  Her CSA and urine drug screen is up-to-date.  She has been experiencing tinnitus of the right ear.  Ear exam is normal.  Hearing exam actually abnormal for the left ear.  Referral to ENT for unilateral tinnitus.  For her hip pain, she has been following with [facility].  The thought is that it may be coming from the spine.  Hip x-ray and MRI did not show any severe arthritic changes according to patient.    MEDICATIONS:  Continue current medications without change  See Patient Instructions    The longitudinal plan of care for the diagnosis(es)/condition(s) as documented were addressed during this visit. Due to the added complexity in care, I will continue to support Paulina in the subsequent management and with ongoing continuity of care.      Subjective   Paulina is a 63 year old, presenting for the following health issues:  Recheck Medication (Med check), Ear Problem (Right ear congestion, some evenings cannot hear out of it, feels like there is static in it. Pt does take an OTC allergy medication everyday, has used sudafed. Very irritating. ), and Musculoskeletal Problem (Left hip pain, pt has been seeing ortho and going to PT, hasn't been helping much. Has another follow up with ortho this week. Just wants Dr. OCASIO aware. )        2/26/2024     9:58 AM   Additional Questions   Roomed by Kellie Ortiz CMA     Ear Problem    Musculoskeletal Problem    History of Present Illness       Reason for visit:  Regular drug  screening    She eats 2-3 servings of fruits and vegetables daily.She consumes 0 sweetened beverage(s) daily.She exercises with enough effort to increase her heart rate 20 to 29 minutes per day.  She exercises with enough effort to increase her heart rate 4 days per week.   She is taking medications regularly.     Patient Active Problem List   Diagnosis    Congenital absence of vagina    Chronic bilateral low back pain with right-sided sciatica    Raynaud's disease without gangrene    Chronic constipation    Pain of right hand    Narcotic drug use- For chronic back pain- # 30 pills /  month CSA/ DAM- 08/2021    Controlled substance agreement signed    Abnormal mammogram    Polyarthralgia    Anxiety    PONV (postoperative nausea and vomiting)    On hormone replacement therapy    Backache    Osteoporosis    Chronic, continuous use of opioids     Current Outpatient Medications   Medication    biotin 5 mg Tab    calcium carbonate (OS-MARISSA) 500 mg calcium (1,250 mg) chewable tablet    cholecalciferol, vitamin D3, 5,000 unit Tab    docusate sodium (COLACE) 100 MG capsule    escitalopram (LEXAPRO) 20 MG tablet    estradioL (VIVELLE-DOT) 0.05 mg/24 hr    gabapentin (NEURONTIN) 100 MG capsule    glucosamine/chondr wright A sod (OSTEO BI-FLEX ORAL)    HYDROcodone-acetaminophen (NORCO) 5-325 MG tablet    lysine 500 mg Tab    mv-min/iron/folic/calcium/vitK (WOMEN'S 50 PLUS MULTIVIT-IRON ORAL)    naproxen sodium (ALEVE) 220 MG tablet    peg 400-propylene glycol (SYSTANE, PROPYLENE GLYCOL,) 0.4-0.3 % Drop    TURMERIC ORAL    desonide (DESOWEN) 0.05 % external cream    fluticasone (FLONASE) 50 MCG/ACT nasal spray    linaclotide (LINZESS) 145 MCG capsule    pimecrolimus (ELIDEL) 1 % external cream     No current facility-administered medications for this visit.           Review of Systems  Constitutional, HEENT, cardiovascular, pulmonary, gi and gu systems are negative, except as otherwise noted.      Objective    BP (!) 140/68 (BP  "Location: Left arm, Patient Position: Sitting, Cuff Size: Adult Regular)   Pulse 63   Resp 16   Ht 1.651 m (5' 5\")   Wt 57.7 kg (127 lb 3.2 oz)   LMP  (LMP Unknown)   SpO2 96%   BMI 21.17 kg/m    Body mass index is 21.17 kg/m .  Physical Exam   GENERAL: alert and no distress  EYES: Eyes grossly normal to inspection, PERRL and conjunctivae and sclerae normal  HENT: ear canals and TM's normal, nose and mouth without ulcers or lesions  NECK: no adenopathy, no asymmetry, masses, or scars  RESP: lungs clear to auscultation - no rales, rhonchi or wheezes  CV: regular rate and rhythm, normal S1 S2, no S3 or S4, no murmur, click or rub, no peripheral edema         7/2/2020     4:00 PM 8/16/2021    11:00 AM 12/23/2022     9:08 AM   FARZANA-7 SCORE   Total Score   0 (minimal anxiety)   Total Score 0 0 0               Signed Electronically by: Salima Lockhart MD    "

## 2024-03-06 ENCOUNTER — TRANSFERRED RECORDS (OUTPATIENT)
Dept: HEALTH INFORMATION MANAGEMENT | Facility: CLINIC | Age: 64
End: 2024-03-06
Payer: COMMERCIAL

## 2024-03-13 ENCOUNTER — TRANSFERRED RECORDS (OUTPATIENT)
Dept: HEALTH INFORMATION MANAGEMENT | Facility: CLINIC | Age: 64
End: 2024-03-13
Payer: COMMERCIAL

## 2024-03-25 ENCOUNTER — OFFICE VISIT (OUTPATIENT)
Dept: FAMILY MEDICINE | Facility: CLINIC | Age: 64
End: 2024-03-25
Payer: COMMERCIAL

## 2024-03-25 ENCOUNTER — TELEPHONE (OUTPATIENT)
Dept: FAMILY MEDICINE | Facility: CLINIC | Age: 64
End: 2024-03-25

## 2024-03-25 VITALS
RESPIRATION RATE: 16 BRPM | OXYGEN SATURATION: 98 % | BODY MASS INDEX: 20.83 KG/M2 | SYSTOLIC BLOOD PRESSURE: 130 MMHG | DIASTOLIC BLOOD PRESSURE: 72 MMHG | HEIGHT: 65 IN | WEIGHT: 125 LBS | HEART RATE: 69 BPM | TEMPERATURE: 98.2 F

## 2024-03-25 DIAGNOSIS — Z01.818 PREOP GENERAL PHYSICAL EXAM: Primary | ICD-10-CM

## 2024-03-25 DIAGNOSIS — F11.90 CHRONIC, CONTINUOUS USE OF OPIOIDS: ICD-10-CM

## 2024-03-25 DIAGNOSIS — Z79.890 ON HORMONE REPLACEMENT THERAPY: ICD-10-CM

## 2024-03-25 PROCEDURE — 99214 OFFICE O/P EST MOD 30 MIN: CPT | Performed by: FAMILY MEDICINE

## 2024-03-25 RX ORDER — ESTRADIOL 0.03 MG/D
1 FILM, EXTENDED RELEASE TRANSDERMAL
COMMUNITY
Start: 2024-01-24

## 2024-03-25 RX ORDER — HYDROCODONE BITARTRATE AND ACETAMINOPHEN 5; 325 MG/1; MG/1
1 TABLET ORAL EVERY 6 HOURS PRN
Qty: 30 TABLET | Refills: 0 | Status: SHIPPED | OUTPATIENT
Start: 2024-03-25 | End: 2024-04-22

## 2024-03-25 RX ORDER — HYDROCODONE BITARTRATE AND ACETAMINOPHEN 5; 325 MG/1; MG/1
1 TABLET ORAL EVERY 6 HOURS PRN
Qty: 30 TABLET | Refills: 0 | Status: SHIPPED | OUTPATIENT
Start: 2024-03-25 | End: 2024-03-25

## 2024-03-25 ASSESSMENT — ANXIETY QUESTIONNAIRES
7. FEELING AFRAID AS IF SOMETHING AWFUL MIGHT HAPPEN: NOT AT ALL
5. BEING SO RESTLESS THAT IT IS HARD TO SIT STILL: NOT AT ALL
GAD7 TOTAL SCORE: 0
7. FEELING AFRAID AS IF SOMETHING AWFUL MIGHT HAPPEN: NOT AT ALL
4. TROUBLE RELAXING: NOT AT ALL
3. WORRYING TOO MUCH ABOUT DIFFERENT THINGS: NOT AT ALL
GAD7 TOTAL SCORE: 0
2. NOT BEING ABLE TO STOP OR CONTROL WORRYING: NOT AT ALL
1. FEELING NERVOUS, ANXIOUS, OR ON EDGE: NOT AT ALL
6. BECOMING EASILY ANNOYED OR IRRITABLE: NOT AT ALL
GAD7 TOTAL SCORE: 0

## 2024-03-25 NOTE — PROGRESS NOTES
Preoperative Evaluation  [facility]  [address] [facility] [address]  Phone: [phone]  Fax: [phone]  Primary Provider: Roxie Lockhart  Pre-op Performing Provider: ROXIE LOCKHART  Mar 25, 2024       Paulina is a 63 year old, presenting for the following:  Pre-Op Exam        3/25/2024     1:51 PM   Additional Questions   Roomed by Gabby ALONSO CMA   Failed to redirect to the Timeline version of the [employer] SmartLink.  Surgical Information  Surgery/Procedure: Left hip arthroscopic revision labral repair  Surgery Location: [facility]  Surgeon:  Dr Santoro  Surgery Date: 4/8/2024  Time of Surgery:   Where patient plans to recover: At home with family  Fax number for surgical facility: [phone], [phone]    Assessment & Plan       ICD-10-CM    1. Preop general physical exam  Z01.818 CBC with platelets and differential      2. Chronic, continuous use of opioids  F11.90 HYDROcodone-acetaminophen (NORCO) 5-325 MG tablet     DISCONTINUED: HYDROcodone-acetaminophen (NORCO) 5-325 MG tablet      3. On hormone replacement therapy  Z79.890         Patient presents today for preop for labral tear of the hip.  If the tear is not repairable, she will get a cadaver tissue transplant.  She uses opioids once a day for her chronic back pain.  These are refilled for her today.  She is on HRT and is on a weaning protocol.  Discussed with patient that she is at higher risk for thrombosis.  She was told that she will be up and about the same day of surgery.  Encouraged her to stay well-hydrated and make sure that she is ambulatory.  She verbalizes understanding.  I discussed that we can do a CBC today as we do not have done recently.  It is not required but can be recommended.  Patient will ask her surgeon if this is needed and come back for a lab only check if she wishes to pursue.  Lab orders are placed.    The proposed surgical procedure is considered  INTERMEDIATE risk.        - No identified additional risk factors other than previously addressed    Antiplatelet or Anticoagulation Medication Instructions   - Patient is on no antiplatelet or anticoagulation medications.    Additional Medication Instructions  Patient is to take all scheduled medications on the day of surgery    Recommendation  APPROVAL GIVEN to proceed with proposed procedure, without further diagnostic evaluation.      Subjective   Chief Complaint   Patient presents with    Pre-Op Exam         HPI related to upcoming procedure: Here for preop exam for hip surgery for labral tear repair.        3/25/2024     1:27 PM   Preop Questions   1. Have you ever had a heart attack or stroke? No   2. Have you ever had surgery on your heart or blood vessels, such as a stent placement, a coronary artery bypass, or surgery on an artery in your head, neck, heart, or legs? No   3. Do you have chest pain with activity? No   4. Do you have a history of  heart failure? No   5. Do you currently have a cold, bronchitis or symptoms of other infection? No   6. Do you have a cough, shortness of breath, or wheezing? No   7. Do you or anyone in your family have previous history of blood clots? No   8. Do you or does anyone in your family have a serious bleeding problem such as prolonged bleeding following surgeries or cuts? No   9. Have you ever had problems with anemia or been told to take iron pills? No   10. Have you had any abnormal blood loss such as black, tarry or bloody stools, or abnormal vaginal bleeding? No   11. Have you ever had a blood transfusion? No   12. Are you willing to have a blood transfusion if it is medically needed before, during, or after your surgery? Yes   13. Have you or any of your relatives ever had problems with anesthesia? No   14. Do you have sleep apnea, excessive snoring or daytime drowsiness? No   15. Do you have any artifical heart valves or other implanted medical devices like a  pacemaker, defibrillator, or continuous glucose monitor? No   16. Do you have artificial joints? YES - Toe and index joint   17. Are you allergic to latex? No       Health Care Directive  Patient does not have a Health Care Directive or Living Will: Patient states has Advance Directive and will bring in a copy to clinic.    Preoperative Review of    reviewed - controlled substances reflected in medication list.      Status of Chronic Conditions:  See problem list for active medical problems.  Problems all longstanding and stable, except as noted/documented.  See ROS for pertinent symptoms related to these conditions.    Patient Active Problem List    Diagnosis Date Noted    Chronic, continuous use of opioids 02/26/2024     Priority: Medium    Backache 08/16/2021     Priority: Medium    Osteoporosis 08/16/2021     Priority: Medium    PONV (postoperative nausea and vomiting) 09/11/2020     Priority: Medium    On hormone replacement therapy 09/11/2020     Priority: Medium    Polyarthralgia 07/02/2020     Priority: Medium    Anxiety 07/02/2020     Priority: Medium    Abnormal mammogram 05/13/2019     Priority: Medium    Narcotic drug use- For chronic back pain- # 30 pills /  month CSA/ DAM- 08/2021 02/18/2019     Priority: Medium    Controlled substance agreement signed 02/18/2019     Priority: Medium    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     Priority: Medium    Chronic bilateral low back pain with right-sided sciatica 01/04/2019     Priority: Medium    Raynaud's disease without gangrene 01/04/2019     Priority: Medium    Chronic constipation 01/04/2019     Priority: Medium    Pain of right hand 01/04/2019     Priority: Medium      Past Medical History:   Diagnosis Date    Chronic bilateral low back pain with right-sided sciatica 1/4/2019    Chronic constipation 1/4/2019    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    Osteoarthritis     Pain of right hand 1/4/2019    Raynaud's disease without gangrene 1/4/2019      Past Surgical History:   Procedure Laterality Date    BACK SURGERY      HIP SURGERY      PICC  10/25/2020         REMOVE HARDWARE LOWER EXTREMITY  10/22/2020    Procedure: WITH REMOVAL OF METACARPOPHALANGEAL JOINT REPLACEMENT, PLACEMENT OF ANTIBIOTIC SPACER;  Surgeon: Reagan Wright MD;  Location: [facility];  Service: Orthopedics     Current Outpatient Medications   Medication Sig Dispense Refill    biotin 5 mg Tab [BIOTIN 5 MG TAB] Take 5 mg by mouth daily.      calcium carbonate (OS-MARISSA) 500 mg calcium (1,250 mg) chewable tablet [CALCIUM CARBONATE (OS-MARISSA) 500 MG CALCIUM (1,250 MG) CHEWABLE TABLET] Chew 2 tablets daily.       cholecalciferol, vitamin D3, 5,000 unit Tab [CHOLECALCIFEROL, VITAMIN D3, 5,000 UNIT TAB] Take 5,000 Units by mouth daily.      docusate sodium (COLACE) 100 MG capsule [DOCUSATE SODIUM (COLACE) 100 MG CAPSULE] Take 300 mg by mouth every evening.      escitalopram (LEXAPRO) 20 MG tablet Take 1 tablet (20 mg) by mouth daily 90 tablet 0    estradiol (VIVELLE-DOT) 0.025 MG/24HR bi-weekly patch Place 1 patch onto the skin twice a week      fluticasone (FLONASE) 50 MCG/ACT nasal spray Spray 2 sprays in nostril      gabapentin (NEURONTIN) 100 MG capsule Take 3 capsules (300 mg) by mouth At Bedtime 270 capsule 3    glucosamine/chondr wright A sod (OSTEO BI-FLEX ORAL) [GLUCOSAMINE/CHONDR WRIGHT A SOD (OSTEO BI-FLEX ORAL)] Take 1 tablet by mouth daily.      HYDROcodone-acetaminophen (NORCO) 5-325 MG tablet Take 1 tablet by mouth every 6 hours as needed for severe pain 30 tablet 0    lysine 500 mg Tab [LYSINE 500 MG TAB] Take 500 mg by mouth daily.      mv-min/iron/folic/calcium/vitK (WOMEN'S 50 PLUS MULTIVIT-IRON ORAL) [MV-MIN/IRON/FOLIC/CALCIUM/VITK (WOMEN'S 50 PLUS MULTIVIT-IRON ORAL)] Take 1 tablet by mouth daily.      naproxen sodium (ALEVE) 220 MG tablet [NAPROXEN SODIUM (ALEVE) 220 MG TABLET] Take 660 mg by mouth every 12 (twelve) hours as needed for pain.      peg 400-propylene glycol  "(SYSTANE, PROPYLENE GLYCOL,) 0.4-0.3 % Drop [-PROPYLENE GLYCOL (SYSTANE, PROPYLENE GLYCOL,) 0.4-0.3 % DROP] Administer 1 drop to both eyes 4 (four) times a day as needed.      TURMERIC ORAL [TURMERIC ORAL] Take 1 capsule by mouth daily.         Allergies   Allergen Reactions    Reglan [Metoclopramide] Unknown     Muscles     Reglan [Metoclopramide] Other (See Comments)     dystonia    Erythromycin Nausea and Vomiting and Rash    Sulfa Antibiotics Rash    Tetracycline Nausea and Vomiting and Rash        Social History     Tobacco Use    Smoking status: Never    Smokeless tobacco: Never   Substance Use Topics    Alcohol use: Yes     Family History   Problem Relation Age of Onset    Dementia Mother         86    Cancer Mother     Breast Cancer Mother 60.00    Cancer Maternal Grandmother     Cancer Paternal Grandmother      History   Drug Use No         Review of Systems    Review of Systems  Constitutional, neuro, ENT, endocrine, pulmonary, cardiac, gastrointestinal, genitourinary, musculoskeletal, integument and psychiatric systems are negative, except as otherwise noted.    Objective    /72 (BP Location: Left arm, Patient Position: Sitting, Cuff Size: Adult Regular)   Pulse 69   Temp 98.2  F (36.8  C) (Oral)   Resp 16   Ht 1.643 m (5' 4.69\")   Wt 56.7 kg (125 lb)   LMP  (LMP Unknown)   SpO2 98%   BMI 21.00 kg/m     Estimated body mass index is 21 kg/m  as calculated from the following:    Height as of this encounter: 1.643 m (5' 4.69\").    Weight as of this encounter: 56.7 kg (125 lb).  Physical Exam  GENERAL: alert and no distress  NECK: no adenopathy, no asymmetry, masses, or scars  RESP: lungs clear to auscultation - no rales, rhonchi or wheezes  CV: regular rate and rhythm, normal S1 S2, no S3 or S4, no murmur, click or rub, no peripheral edema  ABDOMEN: soft, nontender, no hepatosplenomegaly, no masses and bowel sounds normal  MS: no gross musculoskeletal defects noted, no edema    Recent " Labs   Lab Test 10/03/23  0918      POTASSIUM 5.3   CR 0.70        Diagnostics  Labs pending at this time.  Results will be reviewed when available.   No EKG required, no history of coronary heart disease, significant arrhythmia, peripheral arterial disease or other structural heart disease.    Revised Cardiac Risk Index (RCRI)  The patient has the following serious cardiovascular risks for perioperative complications:   - No serious cardiac risks = 0 points     RCRI Interpretation: 0 points: Class I (very low risk - 0.4% complication rate)         Signed Electronically by: Salima Lockhart MD  Copy of this evaluation report is provided to requesting physician.    Answers submitted by the patient for this visit:  FARZANA-7 (Submitted on 3/25/2024)  FARZANA 7 TOTAL SCORE: 0

## 2024-03-25 NOTE — PATIENT INSTRUCTIONS
Preparing for Your Surgery  Getting started  A nurse will call you to review your health history and instructions. They will give you an arrival time based on your scheduled surgery time. Please be ready to share:  Your doctor's clinic name and phone number  Your medical, surgical, and anesthesia history  A list of allergies and sensitivities  A list of medicines, including herbal treatments and over-the-counter drugs  Whether the patient has a legal guardian (ask how to send us the papers in advance)  Please tell us if you're pregnant--or if there's any chance you might be pregnant. Some surgeries may injure a fetus (unborn baby), so they require a pregnancy test. Surgeries that are safe for a fetus don't always need a test, and you can choose whether to have one.   If you have a child who's having surgery, please ask for a copy of Preparing for Your Child's Surgery.    Preparing for surgery  Within 10 to 30 days of surgery: Have a pre-op exam (sometimes called an H&P, or History and Physical). This can be done at a clinic or pre-operative center.  If you're having a , you may not need this exam. Talk to your care team.  At your pre-op exam, talk to your care team about all medicines you take. If you need to stop any medicines before surgery, ask when to start taking them again.  We do this for your safety. Many medicines can make you bleed too much during surgery. Some change how well surgery (anesthesia) drugs work.  Call your insurance company to let them know you're having surgery. (If you don't have insurance, call [phone].)  Call your clinic if there's any change in your health. This includes signs of a cold or flu (sore throat, runny nose, cough, rash, fever). It also includes a scrape or scratch near the surgery site.  If you have questions on the day of surgery, call your hospital or surgery center.  Eating and drinking guidelines  For your safety: Unless your surgeon tells you otherwise,  follow the guidelines below.  Eat and drink as usual until 8 hours before you arrive for surgery. After that, no food or milk.  Drink clear liquids until 2 hours before you arrive. These are liquids you can see through, like water, Gatorade, and Propel Water. They also include plain black coffee and tea (no cream or milk), candy, and breath mints. You can spit out gum when you arrive.  If you drink alcohol: Stop drinking it the night before surgery.  If your care team tells you to take medicine on the morning of surgery, it's okay to take it with a sip of water.  Preventing infection  Shower or bathe the night before and morning of your surgery. Follow the instructions your clinic gave you. (If no instructions, use regular soap.)  Don't shave or clip hair near your surgery site. We'll remove the hair if needed.  Don't smoke or vape the morning of surgery. You may chew nicotine gum up to 2 hours before surgery. A nicotine patch is okay.  Note: Some surgeries require you to completely quit smoking and nicotine. Check with your surgeon.  Your care team will make every effort to keep you safe from infection. We will:  Clean our hands often with soap and water (or an alcohol-based hand rub).  Clean the skin at your surgery site with a special soap that kills germs.  Give you a special gown to keep you warm. (Cold raises the risk of infection.)  Wear special hair covers, masks, gowns and gloves during surgery.  Give antibiotic medicine, if prescribed. Not all surgeries need antibiotics.  What to bring on the day of surgery  Photo ID and insurance card  Copy of your health care directive, if you have one  Glasses and hearing aids (bring cases)  You can't wear contacts during surgery  Inhaler and eye drops, if you use them (tell us about these when you arrive)  CPAP machine or breathing device, if you use them  A few personal items, if spending the night  If you have . . .  A pacemaker, ICD (cardiac defibrillator) or other  implant: Bring the ID card.  An implanted stimulator: Bring the remote control.  A legal guardian: Bring a copy of the certified (court-stamped) guardianship papers.  Please remove any jewelry, including body piercings. Leave jewelry and other valuables at home.  If you're going home the day of surgery  You must have a responsible adult drive you home. They should stay with you overnight as well.  If you don't have someone to stay with you, and you aren't safe to go home alone, we may keep you overnight. Insurance often won't pay for this.  After surgery  If it's hard to control your pain or you need more pain medicine, please call your surgeon's office.  Questions?   If you have any questions for your care team, list them here: _________________________________________________________________________________________________________________________________________________________________________ ____________________________________ ____________________________________ ____________________________________  For informational purposes only. Not to replace the advice of your health care provider. Copyright   2003, 2019 [facility]. All rights reserved. Clinically reviewed by Yue Burleson MD. [employer] [id number] - REV 12/22.    How to Take Your Medication Before Surgery  - Take all of your medications before surgery as usual  - STOP taking all vitamins and herbal supplements 14 days before surgery.

## 2024-03-25 NOTE — TELEPHONE ENCOUNTER
Medication Question or Refill    Contacts         Type Contact Phone/Fax    03/25/2024 08:35 AM CDT Phone (Incoming) Tyler Paulina M (Self) [phone] (M)            What medication are you calling about (include dose and sig)?: hydrocodone    Preferred Pharmacy:       [facility]/pharmacy #[id number] - [facility], [place] - [address]  [address] [address]  Phone: [phone] Fax: [phone]        Controlled Substance Agreement on file:   CSA -- Patient Level:     [Media Unavailable] Controlled Substance Agreement - Opioid - Scan on 10/5/2023  3:18 PM   [Media Unavailable] Controlled Substance Agreement - Opioid - Scan on 1/2/2023 11:28 AM   [Media Unavailable] Controlled Substance Agreement - Opioid - Scan on 8/23/2021 10:38 AM   [Media Unavailable] Controlled Substance Agreement - Non - Opioid - Scan on 9/16/2020: NON-OPIOID CONTROLLED SUBSTANCE AGREEMENT   [Media Unavailable] Controlled Substance Agreement - Opioid - Scan on 1/7/2019       Who prescribed the medication?: pcp    Do you need a refill? Yes    When did you use the medication last? Last night    Patient offered an appointment? No    Do you have any questions or concerns?  No      Could we send this information to you in [facility] or would you prefer to receive a phone call?:   Patient would prefer a phone call   Okay to leave a detailed message?: Yes at Home number on file [phone] (home)

## 2024-04-08 ENCOUNTER — TRANSFERRED RECORDS (OUTPATIENT)
Dept: HEALTH INFORMATION MANAGEMENT | Facility: CLINIC | Age: 64
End: 2024-04-08
Payer: COMMERCIAL

## 2024-04-22 ENCOUNTER — TRANSFERRED RECORDS (OUTPATIENT)
Dept: HEALTH INFORMATION MANAGEMENT | Facility: CLINIC | Age: 64
End: 2024-04-22
Payer: COMMERCIAL

## 2024-04-22 DIAGNOSIS — F11.90 CHRONIC, CONTINUOUS USE OF OPIOIDS: ICD-10-CM

## 2024-04-22 RX ORDER — HYDROCODONE BITARTRATE AND ACETAMINOPHEN 5; 325 MG/1; MG/1
1 TABLET ORAL EVERY 6 HOURS PRN
Qty: 30 TABLET | Refills: 0 | Status: SHIPPED | OUTPATIENT
Start: 2024-04-22 | End: 2024-05-20

## 2024-05-13 DIAGNOSIS — F32.A DEPRESSION: ICD-10-CM

## 2024-05-13 RX ORDER — ESCITALOPRAM OXALATE 20 MG/1
20 TABLET ORAL DAILY
Qty: 90 TABLET | Refills: 2 | Status: SHIPPED | OUTPATIENT
Start: 2024-05-13

## 2024-05-20 ENCOUNTER — TRANSFERRED RECORDS (OUTPATIENT)
Dept: HEALTH INFORMATION MANAGEMENT | Facility: CLINIC | Age: 64
End: 2024-05-20

## 2024-05-20 DIAGNOSIS — F11.90 CHRONIC, CONTINUOUS USE OF OPIOIDS: ICD-10-CM

## 2024-05-20 RX ORDER — HYDROCODONE BITARTRATE AND ACETAMINOPHEN 5; 325 MG/1; MG/1
1 TABLET ORAL EVERY 6 HOURS PRN
Qty: 30 TABLET | Refills: 0 | Status: SHIPPED | OUTPATIENT
Start: 2024-05-20 | End: 2024-06-18

## 2024-05-30 ENCOUNTER — TRANSFERRED RECORDS (OUTPATIENT)
Dept: HEALTH INFORMATION MANAGEMENT | Facility: CLINIC | Age: 64
End: 2024-05-30
Payer: COMMERCIAL

## 2024-06-13 ENCOUNTER — TRANSFERRED RECORDS (OUTPATIENT)
Dept: HEALTH INFORMATION MANAGEMENT | Facility: CLINIC | Age: 64
End: 2024-06-13
Payer: COMMERCIAL

## 2024-06-18 DIAGNOSIS — F11.90 CHRONIC, CONTINUOUS USE OF OPIOIDS: ICD-10-CM

## 2024-06-18 RX ORDER — HYDROCODONE BITARTRATE AND ACETAMINOPHEN 5; 325 MG/1; MG/1
1 TABLET ORAL EVERY 6 HOURS PRN
Qty: 30 TABLET | Refills: 0 | Status: SHIPPED | OUTPATIENT
Start: 2024-06-18 | End: 2024-07-16

## 2024-07-08 ENCOUNTER — TRANSFERRED RECORDS (OUTPATIENT)
Dept: HEALTH INFORMATION MANAGEMENT | Facility: CLINIC | Age: 64
End: 2024-07-08
Payer: COMMERCIAL

## 2024-07-16 DIAGNOSIS — F11.90 CHRONIC, CONTINUOUS USE OF OPIOIDS: ICD-10-CM

## 2024-07-17 RX ORDER — HYDROCODONE BITARTRATE AND ACETAMINOPHEN 5; 325 MG/1; MG/1
1 TABLET ORAL EVERY 6 HOURS PRN
Qty: 30 TABLET | Refills: 0 | Status: SHIPPED | OUTPATIENT
Start: 2024-07-17 | End: 2024-08-15

## 2024-07-22 DIAGNOSIS — Z00.00 ANNUAL PHYSICAL EXAM: ICD-10-CM

## 2024-07-22 RX ORDER — GABAPENTIN 100 MG/1
300 CAPSULE ORAL AT BEDTIME
Qty: 270 CAPSULE | Refills: 3 | Status: SHIPPED | OUTPATIENT
Start: 2024-07-22

## 2024-08-05 ENCOUNTER — TRANSFERRED RECORDS (OUTPATIENT)
Dept: HEALTH INFORMATION MANAGEMENT | Facility: CLINIC | Age: 64
End: 2024-08-05
Payer: COMMERCIAL

## 2024-08-12 ENCOUNTER — TRANSFERRED RECORDS (OUTPATIENT)
Dept: HEALTH INFORMATION MANAGEMENT | Facility: CLINIC | Age: 64
End: 2024-08-12
Payer: COMMERCIAL

## 2024-08-15 DIAGNOSIS — F11.90 CHRONIC, CONTINUOUS USE OF OPIOIDS: ICD-10-CM

## 2024-08-15 RX ORDER — HYDROCODONE BITARTRATE AND ACETAMINOPHEN 5; 325 MG/1; MG/1
1 TABLET ORAL EVERY 6 HOURS PRN
Qty: 30 TABLET | Refills: 0 | Status: SHIPPED | OUTPATIENT
Start: 2024-08-15 | End: 2024-09-12

## 2024-09-03 ENCOUNTER — PATIENT OUTREACH (OUTPATIENT)
Dept: CARE COORDINATION | Facility: CLINIC | Age: 64
End: 2024-09-03
Payer: COMMERCIAL

## 2024-09-12 DIAGNOSIS — F11.90 CHRONIC, CONTINUOUS USE OF OPIOIDS: ICD-10-CM

## 2024-09-12 RX ORDER — HYDROCODONE BITARTRATE AND ACETAMINOPHEN 5; 325 MG/1; MG/1
1 TABLET ORAL EVERY 6 HOURS PRN
Qty: 30 TABLET | Refills: 0 | Status: SHIPPED | OUTPATIENT
Start: 2024-09-12

## 2024-09-12 NOTE — TELEPHONE ENCOUNTER
Last refill: 08/15/24    Last clinic visit: 03/25/24     Next appt: Nothing scheduled with PCP    Controlled substance agreement on file: Yes:  Date 10/05/23.    Documentation in problem list reviewed:  Yes    Rx pended, please advise.

## 2024-09-12 NOTE — TELEPHONE ENCOUNTER
Medication Question or Refill        What medication are you calling about (include dose and sig)?: HYDROcodone-acetaminophen (NORCO) 5-325 MG tablet     Preferred Pharmacy:   [facility] #[id number] - [facility], [place] - [address]  [address] [address]  Phone: [phone] Fax: [phone]      Controlled Substance Agreement on file:   CSA -- Patient Level:     [Media Unavailable] Controlled Substance Agreement - Opioid - Scan on 10/5/2023  3:18 PM   [Media Unavailable] Controlled Substance Agreement - Opioid - Scan on 1/2/2023 11:28 AM   [Media Unavailable] Controlled Substance Agreement - Opioid - Scan on 8/23/2021 10:38 AM   [Media Unavailable] Controlled Substance Agreement - Non - Opioid - Scan on 9/16/2020: NON-OPIOID CONTROLLED SUBSTANCE AGREEMENT   [Media Unavailable] Controlled Substance Agreement - Opioid - Scan on 1/7/2019       Who prescribed the medication?: Dr Lockhart    Do you need a refill? Yes    When did you use the medication last? 9/11/24    Patient offered an appointment? Yes, declined    Do you have any questions or concerns?  Yes: patient is completely out      Could we send this information to you in [facility] or would you prefer to receive a phone call?:   Patient would prefer a phone call   Okay to leave a detailed message?: Yes at Cell number on file:    Telephone Information:   Mobile [phone]   Mobile [phone]

## 2024-09-17 ENCOUNTER — PATIENT OUTREACH (OUTPATIENT)
Dept: CARE COORDINATION | Facility: CLINIC | Age: 64
End: 2024-09-17
Payer: COMMERCIAL

## 2024-09-27 ENCOUNTER — OFFICE VISIT (OUTPATIENT)
Dept: OTOLARYNGOLOGY | Facility: CLINIC | Age: 64
End: 2024-09-27
Attending: FAMILY MEDICINE
Payer: COMMERCIAL

## 2024-09-27 ENCOUNTER — OFFICE VISIT (OUTPATIENT)
Dept: AUDIOLOGY | Facility: CLINIC | Age: 64
End: 2024-09-27
Attending: FAMILY MEDICINE
Payer: COMMERCIAL

## 2024-09-27 DIAGNOSIS — J31.0 CHRONIC RHINITIS: ICD-10-CM

## 2024-09-27 DIAGNOSIS — Z98.890 HISTORY OF ENDOSCOPIC SINUS SURGERY: ICD-10-CM

## 2024-09-27 DIAGNOSIS — B00.1 RECURRENT COLD SORES: Primary | ICD-10-CM

## 2024-09-27 DIAGNOSIS — Z01.10 NORMAL HEARING NOTED ON EXAMINATION: ICD-10-CM

## 2024-09-27 DIAGNOSIS — R09.81 NASAL CONGESTION: ICD-10-CM

## 2024-09-27 DIAGNOSIS — H93.11 TINNITUS OF RIGHT EAR: ICD-10-CM

## 2024-09-27 DIAGNOSIS — H93.13 TINNITUS OF BOTH EARS: Primary | ICD-10-CM

## 2024-09-27 DIAGNOSIS — J34.2 DEVIATED NASAL SEPTUM: ICD-10-CM

## 2024-09-27 PROCEDURE — 92557 COMPREHENSIVE HEARING TEST: CPT | Performed by: AUDIOLOGIST

## 2024-09-27 PROCEDURE — 92550 TYMPANOMETRY & REFLEX THRESH: CPT | Mod: 52 | Performed by: AUDIOLOGIST

## 2024-09-27 PROCEDURE — 99203 OFFICE O/P NEW LOW 30 MIN: CPT | Performed by: OTOLARYNGOLOGY

## 2024-09-27 RX ORDER — AZELASTINE 1 MG/ML
SPRAY, METERED NASAL
Qty: 30 ML | Refills: 11 | Status: SHIPPED | OUTPATIENT
Start: 2024-09-27

## 2024-09-27 RX ORDER — VALACYCLOVIR HYDROCHLORIDE 500 MG/1
500 TABLET, FILM COATED ORAL DAILY
Qty: 120 TABLET | Refills: 0 | Status: SHIPPED | OUTPATIENT
Start: 2024-09-27 | End: 2025-01-25

## 2024-09-27 NOTE — PROGRESS NOTES
CHIEF COMPLAINT: Patient presents with:  Tinnitus: Started back in Feb pt reports having ringing in her ears but mostly left ear. It is mostly resolved.          HISTORY OF PRESENT ILLNESS    Paulina was seen at the behest of Salima Lockhart MD for hearing concern.    AUDIOLOGY NOTE:      HISTORY: Patient reports she had constant high- pitched tinnitus that lasted for a few weeks back in February/ March 2024 when she made this appointment. She reports this has resolved for the most part. She reports decreased hearing in both ears and occasional aural fullness in both ears. She reports intermittent symptoms of vertigo. She reports her brother and sister had a lot of ear infections and her brother had hearing loss from a young age. She denies otalgia, otorrhea, past ear surgery, noise exposure, and prior use of amplification. She is a  but hasn' t dove in a few years. She does have concern about her sinuses and throat today. RESULTS: Otoscopy: very mild wax in both ears. Tymps: Type A bilaterally. Reflexes: Present right ipsi, absent left ipsi; Contra not tested due to equipment limitations. Audio: normal hearing bilaterally. REC: F/ U with ENT         REVIEW OF SYSTEMS    Review of Systems as per HPI and PMHx, otherwise 10 system review system are negative.       ALLERGIES    Reglan [metoclopramide], Reglan [metoclopramide], Erythromycin, Sulfa antibiotics, and Tetracycline    CURRENT MEDICATIONS      Current Outpatient Medications:     azelastine (ASTELIN) 0.1 % nasal spray, 2 sprays each nostril 1-2x daily as needed for nasal congestion (use nightly for first 2 week), Disp: 30 mL, Rfl: 11    valACYclovir (VALTREX) 500 MG tablet, Take 1 tablet (500 mg) by mouth daily., Disp: 120 tablet, Rfl: 0    biotin 5 mg Tab, [BIOTIN 5 MG TAB] Take 5 mg by mouth daily., Disp: , Rfl:     calcium carbonate (OS-MARISSA) 500 mg calcium (1,250 mg) chewable tablet, [CALCIUM CARBONATE (OS-MARISSA) 500 MG CALCIUM (1,250 MG) CHEWABLE  TABLET] Chew 2 tablets daily. , Disp: , Rfl:     cholecalciferol, vitamin D3, 5,000 unit Tab, [CHOLECALCIFEROL, VITAMIN D3, 5,000 UNIT TAB] Take 5,000 Units by mouth daily., Disp: , Rfl:     docusate sodium (COLACE) 100 MG capsule, [DOCUSATE SODIUM (COLACE) 100 MG CAPSULE] Take 300 mg by mouth every evening., Disp: , Rfl:     escitalopram (LEXAPRO) 20 MG tablet, Take 1 tablet (20 mg) by mouth daily, Disp: 90 tablet, Rfl: 2    estradiol (VIVELLE-DOT) 0.025 MG/24HR bi-weekly patch, Place 1 patch onto the skin twice a week, Disp: , Rfl:     fluticasone (FLONASE) 50 MCG/ACT nasal spray, Spray 2 sprays in nostril, Disp: , Rfl:     gabapentin (NEURONTIN) 100 MG capsule, Take 3 capsules (300 mg) by mouth at bedtime, Disp: 270 capsule, Rfl: 3    glucosamine/chondr wright A sod (OSTEO BI-FLEX ORAL), [GLUCOSAMINE/CHONDR WRIGHT A SOD (OSTEO BI-FLEX ORAL)] Take 1 tablet by mouth daily., Disp: , Rfl:     HYDROcodone-acetaminophen (NORCO) 5-325 MG tablet, Take 1 tablet by mouth every 6 hours as needed for severe pain., Disp: 30 tablet, Rfl: 0    lysine 500 mg Tab, [LYSINE 500 MG TAB] Take 500 mg by mouth daily., Disp: , Rfl:     mv-min/iron/folic/calcium/vitK (WOMEN'S 50 PLUS MULTIVIT-IRON ORAL), [MV-MIN/IRON/FOLIC/CALCIUM/VITK (WOMEN'S 50 PLUS MULTIVIT-IRON ORAL)] Take 1 tablet by mouth daily., Disp: , Rfl:     naproxen sodium (ALEVE) 220 MG tablet, [NAPROXEN SODIUM (ALEVE) 220 MG TABLET] Take 660 mg by mouth every 12 (twelve) hours as needed for pain., Disp: , Rfl:     peg 400-propylene glycol (SYSTANE, PROPYLENE GLYCOL,) 0.4-0.3 % Drop, [-PROPYLENE GLYCOL (SYSTANE, PROPYLENE GLYCOL,) 0.4-0.3 % DROP] Administer 1 drop to both eyes 4 (four) times a day as needed., Disp: , Rfl:     TURMERIC ORAL, [TURMERIC ORAL] Take 1 capsule by mouth daily., Disp: , Rfl:      PAST MEDICAL HISTORY    PAST MEDICAL HISTORY:   Past Medical History:   Diagnosis Date    Chronic bilateral low back pain with right-sided sciatica 1/4/2019    Chronic  constipation 1/4/2019    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    Osteoarthritis     Pain of right hand 1/4/2019    Raynaud's disease without gangrene 1/4/2019       PAST SURGICAL HISTORY    PAST SURGICAL HISTORY:   Past Surgical History:   Procedure Laterality Date    BACK SURGERY      HIP SURGERY      PICC  10/25/2020         REMOVE HARDWARE LOWER EXTREMITY  10/22/2020    Procedure: WITH REMOVAL OF METACARPOPHALANGEAL JOINT REPLACEMENT, PLACEMENT OF ANTIBIOTIC SPACER;  Surgeon: Reagan Dumont MD;  [facility];  Service: Orthopedics       FAMILY  HISTORY    FAMILY HISTORY:   Family History   Problem Relation Age of Onset    Dementia Mother         86    Cancer Mother     Breast Cancer Mother 60.00    Cancer Maternal Grandmother     Cancer Paternal Grandmother        SOCIAL HISTORY    SOCIAL HISTORY:   Social History     Tobacco Use    Smoking status: Never    Smokeless tobacco: Never   Substance Use Topics    Alcohol use: Yes        PHYSICAL EXAM    HEAD: Normal appearance and symmetry:  No cutaneous lesions.      NECK:  supple     EARS:    Right:   TM intact nl   LEFT:   TM intact nl     EYES:  EOMI    CN VII/XII:  intact     NOSE:     Dorsum:   straight  Septum:  deviated off crest to left  Mucosa:  moist   ITH:  3+ pale, boggy     ORAL CAVITY/OROPHARYNX:     Lips:  Normal.  Tongue: normal, midline  Mucosa:   2-3 mm ulcerative lesion left soft palate     NECK:  Trachea:  midline.              Thyroid:  normal              Adenopathy:  none        NEURO:   Alert and Oriented     GAIT AND STATION:  normal     RESPIRATORY:   Symmetry and Respiratory effort     PSYCH:  Normal mood and affect     SKIN:   warm and dry         IMPRESSION:    Encounter Diagnoses   Name Primary?    Recurrent cold sores Yes    Chronic rhinitis     Deviated nasal septum     Nasal congestion     Normal hearing noted on examination           RECOMMENDATIONS:    Orders Placed This Encounter   Medications    valACYclovir  (VALTREX) 500 MG tablet     Sig: Take 1 tablet (500 mg) by mouth daily.     Dispense:  120 tablet     Refill:  0    azelastine (ASTELIN) 0.1 % nasal spray     Si sprays each nostril 1-2x daily as needed for nasal congestion (use nightly for first 2 week)     Dispense:  30 mL     Refill:  11     2 sprays each nostril 1-2x daily as needed for nasal congestion (use nightly for first 2 week)      Return visit 4 months for recheck.   Astelin nasal spray as directed  Valtrex for recurrent oral ulcers

## 2024-09-27 NOTE — LETTER
9/27/2024      Paulina Scott  [address] [address]      Dear Colleague,    Thank you for referring your patient, Paulina Scott, to the [facility]. Please see a copy of my visit note below.    CHIEF COMPLAINT: Patient presents with:  Tinnitus: Started back in Feb pt reports having ringing in her ears but mostly left ear. It is mostly resolved.          HISTORY OF PRESENT ILLNESS    Paulina was seen at the behest of Salima Lockhart MD for hearing concern.    AUDIOLOGY NOTE:      HISTORY: Patient reports she had constant high- pitched tinnitus that lasted for a few weeks back in February/ March 2024 when she made this appointment. She reports this has resolved for the most part. She reports decreased hearing in both ears and occasional aural fullness in both ears. She reports intermittent symptoms of vertigo. She reports her brother and sister had a lot of ear infections and her brother had hearing loss from a young age. She denies otalgia, otorrhea, past ear surgery, noise exposure, and prior use of amplification. She is a  but hasn' t dove in a few years. She does have concern about her sinuses and throat today. RESULTS: Otoscopy: very mild wax in both ears. Tymps: Type A bilaterally. Reflexes: Present right ipsi, absent left ipsi; Contra not tested due to equipment limitations. Audio: normal hearing bilaterally. REC: F/ U with ENT         REVIEW OF SYSTEMS    Review of Systems as per HPI and PMHx, otherwise 10 system review system are negative.       ALLERGIES    Reglan [metoclopramide], Reglan [metoclopramide], Erythromycin, Sulfa antibiotics, and Tetracycline    CURRENT MEDICATIONS      Current Outpatient Medications:      azelastine (ASTELIN) 0.1 % nasal spray, 2 sprays each nostril 1-2x daily as needed for nasal congestion (use nightly for first 2 week), Disp: 30 mL, Rfl: 11     valACYclovir (VALTREX) 500 MG tablet, Take 1 tablet (500 mg) by mouth daily.,  Disp: 120 tablet, Rfl: 0     biotin 5 mg Tab, [BIOTIN 5 MG TAB] Take 5 mg by mouth daily., Disp: , Rfl:      calcium carbonate (OS-MARISSA) 500 mg calcium (1,250 mg) chewable tablet, [CALCIUM CARBONATE (OS-MARISSA) 500 MG CALCIUM (1,250 MG) CHEWABLE TABLET] Chew 2 tablets daily. , Disp: , Rfl:      cholecalciferol, vitamin D3, 5,000 unit Tab, [CHOLECALCIFEROL, VITAMIN D3, 5,000 UNIT TAB] Take 5,000 Units by mouth daily., Disp: , Rfl:      docusate sodium (COLACE) 100 MG capsule, [DOCUSATE SODIUM (COLACE) 100 MG CAPSULE] Take 300 mg by mouth every evening., Disp: , Rfl:      escitalopram (LEXAPRO) 20 MG tablet, Take 1 tablet (20 mg) by mouth daily, Disp: 90 tablet, Rfl: 2     estradiol (VIVELLE-DOT) 0.025 MG/24HR bi-weekly patch, Place 1 patch onto the skin twice a week, Disp: , Rfl:      fluticasone (FLONASE) 50 MCG/ACT nasal spray, Spray 2 sprays in nostril, Disp: , Rfl:      gabapentin (NEURONTIN) 100 MG capsule, Take 3 capsules (300 mg) by mouth at bedtime, Disp: 270 capsule, Rfl: 3     glucosamine/chondr wright A sod (OSTEO BI-FLEX ORAL), [GLUCOSAMINE/CHONDR WRIGHT A SOD (OSTEO BI-FLEX ORAL)] Take 1 tablet by mouth daily., Disp: , Rfl:      HYDROcodone-acetaminophen (NORCO) 5-325 MG tablet, Take 1 tablet by mouth every 6 hours as needed for severe pain., Disp: 30 tablet, Rfl: 0     lysine 500 mg Tab, [LYSINE 500 MG TAB] Take 500 mg by mouth daily., Disp: , Rfl:      mv-min/iron/folic/calcium/vitK (WOMEN'S 50 PLUS MULTIVIT-IRON ORAL), [MV-MIN/IRON/FOLIC/CALCIUM/VITK (WOMEN'S 50 PLUS MULTIVIT-IRON ORAL)] Take 1 tablet by mouth daily., Disp: , Rfl:      naproxen sodium (ALEVE) 220 MG tablet, [NAPROXEN SODIUM (ALEVE) 220 MG TABLET] Take 660 mg by mouth every 12 (twelve) hours as needed for pain., Disp: , Rfl:      peg 400-propylene glycol (SYSTANE, PROPYLENE GLYCOL,) 0.4-0.3 % Drop, [-PROPYLENE GLYCOL (SYSTANE, PROPYLENE GLYCOL,) 0.4-0.3 % DROP] Administer 1 drop to both eyes 4 (four) times a day as needed., Disp: , Rfl:       TURMERIC ORAL, [TURMERIC ORAL] Take 1 capsule by mouth daily., Disp: , Rfl:      PAST MEDICAL HISTORY    PAST MEDICAL HISTORY:   Past Medical History:   Diagnosis Date     Chronic bilateral low back pain with right-sided sciatica 1/4/2019     Chronic constipation 1/4/2019     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     Osteoarthritis      Pain of right hand 1/4/2019     Raynaud's disease without gangrene 1/4/2019       PAST SURGICAL HISTORY    PAST SURGICAL HISTORY:   Past Surgical History:   Procedure Laterality Date     BACK SURGERY       HIP SURGERY       PICC  10/25/2020          REMOVE HARDWARE LOWER EXTREMITY  10/22/2020    Procedure: WITH REMOVAL OF METACARPOPHALANGEAL JOINT REPLACEMENT, PLACEMENT OF ANTIBIOTIC SPACER;  Surgeon: Reagan Dumont MD;  Location: [facility];  Service: Orthopedics       FAMILY  HISTORY    FAMILY HISTORY:   Family History   Problem Relation Age of Onset     Dementia Mother         86     Cancer Mother      Breast Cancer Mother 60.00     Cancer Maternal Grandmother      Cancer Paternal Grandmother        SOCIAL HISTORY    SOCIAL HISTORY:   Social History     Tobacco Use     Smoking status: Never     Smokeless tobacco: Never   Substance Use Topics     Alcohol use: Yes        PHYSICAL EXAM    HEAD: Normal appearance and symmetry:  No cutaneous lesions.      NECK:  supple     EARS:    Right:   TM intact nl   LEFT:   TM intact nl     EYES:  EOMI    CN VII/XII:  intact     NOSE:     Dorsum:   straight  Septum:  deviated off crest to left  Mucosa:  moist   ITH:  3+ pale, boggy     ORAL CAVITY/OROPHARYNX:     Lips:  Normal.  Tongue: normal, midline  Mucosa:   2-3 mm ulcerative lesion left soft palate     NECK:  Trachea:  midline.              Thyroid:  normal              Adenopathy:  none        NEURO:   Alert and Oriented     GAIT AND STATION:  normal     RESPIRATORY:   Symmetry and Respiratory effort     PSYCH:  Normal mood and affect     SKIN:   warm and dry          IMPRESSION:    Encounter Diagnoses   Name Primary?     Recurrent cold sores Yes     Chronic rhinitis      Deviated nasal septum      Nasal congestion      Normal hearing noted on examination           RECOMMENDATIONS:    Orders Placed This Encounter   Medications     valACYclovir (VALTREX) 500 MG tablet     Sig: Take 1 tablet (500 mg) by mouth daily.     Dispense:  120 tablet     Refill:  0     azelastine (ASTELIN) 0.1 % nasal spray     Si sprays each nostril 1-2x daily as needed for nasal congestion (use nightly for first 2 week)     Dispense:  30 mL     Refill:  11     2 sprays each nostril 1-2x daily as needed for nasal congestion (use nightly for first 2 week)      Return visit 4 months for recheck.   Astelin nasal spray as directed  Valtrex for recurrent oral ulcers        Again, thank you for allowing me to participate in the care of your patient.        Sincerely,        Celestine Jauregui MD

## 2024-09-27 NOTE — PROGRESS NOTES
AUDIOLOGY REPORT     SUMMARY: Audiology visit completed. See audiogram for results.       RECOMMENDATIONS: Follow-up with ENT.    Neto Pisano, CCC-A  [place] Licensed Audiologist #[id number]

## 2024-10-03 ENCOUNTER — TRANSFERRED RECORDS (OUTPATIENT)
Dept: HEALTH INFORMATION MANAGEMENT | Facility: CLINIC | Age: 64
End: 2024-10-03

## 2024-10-07 ENCOUNTER — TRANSFERRED RECORDS (OUTPATIENT)
Dept: HEALTH INFORMATION MANAGEMENT | Facility: CLINIC | Age: 64
End: 2024-10-07
Payer: COMMERCIAL

## 2024-10-07 ENCOUNTER — ANCILLARY PROCEDURE (OUTPATIENT)
Dept: MAMMOGRAPHY | Facility: CLINIC | Age: 64
End: 2024-10-07
Attending: OBSTETRICS & GYNECOLOGY
Payer: COMMERCIAL

## 2024-10-07 DIAGNOSIS — Z12.31 VISIT FOR SCREENING MAMMOGRAM: ICD-10-CM

## 2024-10-07 PROCEDURE — 77063 BREAST TOMOSYNTHESIS BI: CPT

## 2024-10-10 ENCOUNTER — TRANSFERRED RECORDS (OUTPATIENT)
Dept: HEALTH INFORMATION MANAGEMENT | Facility: CLINIC | Age: 64
End: 2024-10-10
Payer: COMMERCIAL

## 2024-10-10 DIAGNOSIS — F11.90 CHRONIC, CONTINUOUS USE OF OPIOIDS: ICD-10-CM

## 2024-10-10 RX ORDER — HYDROCODONE BITARTRATE AND ACETAMINOPHEN 5; 325 MG/1; MG/1
1 TABLET ORAL EVERY 6 HOURS PRN
Qty: 30 TABLET | Refills: 0 | Status: SHIPPED | OUTPATIENT
Start: 2024-10-10 | End: 2024-11-07

## 2024-10-15 ENCOUNTER — TRANSFERRED RECORDS (OUTPATIENT)
Dept: HEALTH INFORMATION MANAGEMENT | Facility: CLINIC | Age: 64
End: 2024-10-15

## 2024-10-21 ENCOUNTER — TRANSFERRED RECORDS (OUTPATIENT)
Dept: HEALTH INFORMATION MANAGEMENT | Facility: CLINIC | Age: 64
End: 2024-10-21
Payer: COMMERCIAL

## 2024-11-07 ENCOUNTER — TELEPHONE (OUTPATIENT)
Dept: FAMILY MEDICINE | Facility: CLINIC | Age: 64
End: 2024-11-07
Payer: COMMERCIAL

## 2024-11-07 DIAGNOSIS — F11.90 CHRONIC, CONTINUOUS USE OF OPIOIDS: ICD-10-CM

## 2024-11-07 RX ORDER — HYDROCODONE BITARTRATE AND ACETAMINOPHEN 5; 325 MG/1; MG/1
1 TABLET ORAL EVERY 6 HOURS PRN
Qty: 30 TABLET | Refills: 0 | Status: SHIPPED | OUTPATIENT
Start: 2024-11-07

## 2024-11-07 NOTE — TELEPHONE ENCOUNTER
Medication Question or Refill    Contacts       Contact Date/Time Type Contact Phone/Fax    11/07/2024 08:38 AM CST Phone (Incoming) DannitraciePaulina JOSE (Self) [phone] (M)            What medication are you calling about (include dose and sig)?: Hydrocodone     Preferred Pharmacy:   [id number] - [place], [place] - [address]  [address] [address]  Phone: [phone] Fax: [phone]    [id number] - [place], [place] - [address]  [address] [address]  Phone: [phone] Fax: [phone]    [facility] - [place] [address] [facility] [address] [facility] [address]  Phone: [phone] Fax: [phone]    [facility] [address] [address]  Phone: [phone] Fax: [phone]      Controlled Substance Agreement on file:   CSA -- Patient Level:     [Media Unavailable] Controlled Substance Agreement - Opioid - Scan on 10/5/2023  3:18 PM   [Media Unavailable] Controlled Substance Agreement - Opioid - Scan on 1/2/2023 11:28 AM   [Media Unavailable] Controlled Substance Agreement - Opioid - Scan on 8/23/2021 10:38 AM   [Media Unavailable] Controlled Substance Agreement - Non - Opioid - Scan on 9/16/2020: NON-OPIOID CONTROLLED SUBSTANCE AGREEMENT   [Media Unavailable] Controlled Substance Agreement - Opioid - Scan on 1/7/2019       Who prescribed the medication?: Dr. Lockhart    Do you need a refill? Yes    When did you use the medication last? yesterday    Patient offered an appointment? No    Do you have any questions or concerns?  No      Could we send this information to you in [facility] or would you prefer to receive a phone call?:   Patient would prefer a phone call   Okay to leave a detailed message?: Yes at Cell number on file:    Telephone Information:   Mobile [phone]   Mobile [phone]

## 2024-11-07 NOTE — TELEPHONE ENCOUNTER
Medication is being refilled for the pharmacy.  However, she is now overdue for annual physical and controlled substance agreement.  Please help schedule follow-up    Salima Lockhart MD

## 2024-12-19 SDOH — HEALTH STABILITY: PHYSICAL HEALTH: ON AVERAGE, HOW MANY MINUTES DO YOU ENGAGE IN EXERCISE AT THIS LEVEL?: 40 MIN

## 2024-12-19 SDOH — HEALTH STABILITY: PHYSICAL HEALTH: ON AVERAGE, HOW MANY DAYS PER WEEK DO YOU ENGAGE IN MODERATE TO STRENUOUS EXERCISE (LIKE A BRISK WALK)?: 4 DAYS

## 2024-12-19 ASSESSMENT — SOCIAL DETERMINANTS OF HEALTH (SDOH): HOW OFTEN DO YOU GET TOGETHER WITH FRIENDS OR RELATIVES?: TWICE A WEEK

## 2024-12-23 ENCOUNTER — OFFICE VISIT (OUTPATIENT)
Dept: FAMILY MEDICINE | Facility: CLINIC | Age: 64
End: 2024-12-23
Payer: COMMERCIAL

## 2024-12-23 ENCOUNTER — TRANSFERRED RECORDS (OUTPATIENT)
Dept: MULTI SPECIALTY CLINIC | Facility: CLINIC | Age: 64
End: 2024-12-23

## 2024-12-23 VITALS
WEIGHT: 128.6 LBS | DIASTOLIC BLOOD PRESSURE: 74 MMHG | HEIGHT: 65 IN | SYSTOLIC BLOOD PRESSURE: 138 MMHG | OXYGEN SATURATION: 100 % | HEART RATE: 71 BPM | RESPIRATION RATE: 16 BRPM | BODY MASS INDEX: 21.43 KG/M2

## 2024-12-23 DIAGNOSIS — M54.41 CHRONIC BILATERAL LOW BACK PAIN WITH RIGHT-SIDED SCIATICA: ICD-10-CM

## 2024-12-23 DIAGNOSIS — Z00.00 ROUTINE GENERAL MEDICAL EXAMINATION AT A HEALTH CARE FACILITY: Primary | ICD-10-CM

## 2024-12-23 DIAGNOSIS — R73.9 BLOOD GLUCOSE ELEVATED: ICD-10-CM

## 2024-12-23 DIAGNOSIS — Z78.0 POSTMENOPAUSAL STATUS: ICD-10-CM

## 2024-12-23 DIAGNOSIS — Z79.899 CONTROLLED SUBSTANCE AGREEMENT SIGNED: ICD-10-CM

## 2024-12-23 DIAGNOSIS — G89.29 CHRONIC BILATERAL LOW BACK PAIN WITH RIGHT-SIDED SCIATICA: ICD-10-CM

## 2024-12-23 PROBLEM — Z79.890 ON HORMONE REPLACEMENT THERAPY: Status: RESOLVED | Noted: 2020-09-11 | Resolved: 2024-12-23

## 2024-12-23 LAB
ALBUMIN SERPL BCG-MCNC: 4.8 G/DL (ref 3.5–5.2)
ALP SERPL-CCNC: 108 U/L (ref 40–150)
ALT SERPL W P-5'-P-CCNC: 15 U/L (ref 0–50)
ANION GAP SERPL CALCULATED.3IONS-SCNC: 13 MMOL/L (ref 7–15)
AST SERPL W P-5'-P-CCNC: 25 U/L (ref 0–45)
BASOPHILS # BLD AUTO: 0 10E3/UL (ref 0–0.2)
BASOPHILS NFR BLD AUTO: 0 %
BILIRUB SERPL-MCNC: 0.2 MG/DL
BUN SERPL-MCNC: 13.3 MG/DL (ref 8–23)
CALCIUM SERPL-MCNC: 9.9 MG/DL (ref 8.8–10.4)
CHLORIDE SERPL-SCNC: 100 MMOL/L (ref 98–107)
CHOLEST SERPL-MCNC: 261 MG/DL
CREAT SERPL-MCNC: 0.69 MG/DL (ref 0.51–0.95)
CREAT UR-MCNC: 11 MG/DL
EGFRCR SERPLBLD CKD-EPI 2021: >90 ML/MIN/1.73M2
EOSINOPHIL # BLD AUTO: 0.1 10E3/UL (ref 0–0.7)
EOSINOPHIL NFR BLD AUTO: 1 %
ERYTHROCYTE [DISTWIDTH] IN BLOOD BY AUTOMATED COUNT: 12 % (ref 10–15)
FASTING STATUS PATIENT QL REPORTED: ABNORMAL
FASTING STATUS PATIENT QL REPORTED: ABNORMAL
GLUCOSE SERPL-MCNC: 148 MG/DL (ref 70–99)
HCO3 SERPL-SCNC: 27 MMOL/L (ref 22–29)
HCT VFR BLD AUTO: 38.5 % (ref 35–47)
HDLC SERPL-MCNC: 95 MG/DL
HGB BLD-MCNC: 13.3 G/DL (ref 11.7–15.7)
IMM GRANULOCYTES # BLD: 0 10E3/UL
IMM GRANULOCYTES NFR BLD: 0 %
LDLC SERPL CALC-MCNC: 119 MG/DL
LYMPHOCYTES # BLD AUTO: 1.9 10E3/UL (ref 0.8–5.3)
LYMPHOCYTES NFR BLD AUTO: 27 %
MCH RBC QN AUTO: 31.8 PG (ref 26.5–33)
MCHC RBC AUTO-ENTMCNC: 34.5 G/DL (ref 31.5–36.5)
MCV RBC AUTO: 92 FL (ref 78–100)
MONOCYTES # BLD AUTO: 0.5 10E3/UL (ref 0–1.3)
MONOCYTES NFR BLD AUTO: 7 %
NEUTROPHILS # BLD AUTO: 4.3 10E3/UL (ref 1.6–8.3)
NEUTROPHILS NFR BLD AUTO: 64 %
NONHDLC SERPL-MCNC: 166 MG/DL
PLATELET # BLD AUTO: 306 10E3/UL (ref 150–450)
POTASSIUM SERPL-SCNC: 4.3 MMOL/L (ref 3.4–5.3)
PROT SERPL-MCNC: 7.4 G/DL (ref 6.4–8.3)
RBC # BLD AUTO: 4.18 10E6/UL (ref 3.8–5.2)
RETINOPATHY: NORMAL
SODIUM SERPL-SCNC: 140 MMOL/L (ref 135–145)
TRIGL SERPL-MCNC: 234 MG/DL
WBC # BLD AUTO: 6.8 10E3/UL (ref 4–11)

## 2024-12-23 PROCEDURE — 80053 COMPREHEN METABOLIC PANEL: CPT | Performed by: FAMILY MEDICINE

## 2024-12-23 PROCEDURE — 90677 PCV20 VACCINE IM: CPT | Performed by: FAMILY MEDICINE

## 2024-12-23 PROCEDURE — 85025 COMPLETE CBC W/AUTO DIFF WBC: CPT | Performed by: FAMILY MEDICINE

## 2024-12-23 PROCEDURE — 90471 IMMUNIZATION ADMIN: CPT | Performed by: FAMILY MEDICINE

## 2024-12-23 PROCEDURE — 80061 LIPID PANEL: CPT | Performed by: FAMILY MEDICINE

## 2024-12-23 PROCEDURE — 36415 COLL VENOUS BLD VENIPUNCTURE: CPT | Performed by: FAMILY MEDICINE

## 2024-12-23 RX ORDER — CLOBETASOL PROPIONATE 0.5 MG/G
OINTMENT TOPICAL
COMMUNITY

## 2024-12-23 NOTE — PROGRESS NOTES
Preventive Care Visit  [facility]  Salima Lockhart MD, Family Medicine  Dec 23, 2024      Assessment & Plan     ICD-10-CM    1. Routine general medical examination at a health care facility  Z00.00 Lipid panel reflex to direct LDL Non-fasting     CBC with platelets and differential     Comprehensive metabolic panel (BMP + Alb, Alk Phos, ALT, AST, Total. Bili, TP)      2. Chronic bilateral low back pain with right-sided sciatica  M54.41 [id number] - Urine Drug Confirmation Panel (Comprehensive)    G89.29       3. Controlled substance agreement signed  Z79.899 [id number] - Urine Drug Confirmation Panel (Comprehensive)      4. Postmenopausal status  Z78.0 DX Bone Density              Counseling  Appropriate preventive services were addressed with this patient via screening, questionnaire, or discussion as appropriate for fall prevention, nutrition, physical activity, Tobacco-use cessation, social engagement, weight loss and cognition.  Checklist reviewing preventive services available has been given to the patient.  Reviewed patient's diet, addressing concerns and/or questions.   Patient reported safety concerns were addressed today.The patient was provided with written information regarding signs of hearing loss.   I have reviewed Opioid Use Disorder and Substance Use Disorder risk factors and made any needed referrals.       MEDICATIONS:  Continue current medications without change  See Patient Instructions    Nilay Gallardo is a 64 year old, presenting for the following:  Physical (Pt is NOT on medicare yet. Pt is NOT fasting today, no concerns today. )        12/23/2024     3:30 PM   Additional Questions   Roomed by Kellie Ortiz CMA          HPI      Health Care Directive  Patient does not have a Health Care Directive: Discussed advance care planning with patient; however, patient declined at this time.      12/19/2024   General Health   How would you rate your overall physical  health? Good   Feel stress (tense, anxious, or unable to sleep) Not at all         12/19/2024   Nutrition   Diet: Regular (no restrictions)         12/19/2024   Exercise   Days per week of moderate/strenous exercise 4 days   Average minutes spent exercising at this level 40 min         12/19/2024   Social Factors   Frequency of gathering with friends or relatives Twice a week   Worry food won't last until get money to buy more No   Food not last or not have enough money for food? No   Do you have housing? (Housing is defined as stable permanent housing and does not include staying ouside in a car, in a tent, in an abandoned building, in an overnight shelter, or couch-surfing.) Yes   Are you worried about losing your housing? No   Lack of transportation? No   Unable to get utilities (heat,electricity)? No         12/23/2024   Fall Risk   Gait Speed Test (Document in seconds) 2.85   Gait Speed Test Interpretation Less than or equal to 5.00 seconds - PASS          12/19/2024   Activities of Daily Living- Home Safety   Needs help with the following daily activites None of the above   Safety concerns in the home No grab bars in the bathroom         12/19/2024   Dental   Dentist two times every year? Yes         12/19/2024   Hearing Screening   Hearing concerns? (!) I NEED TO ASK PEOPLE TO SPEAK UP OR REPEAT THEMSELVES.    (!) IT'S HARD TO FOLLOW A CONVERSATION IN A NOISY RESTAURANT OR CROWDED ROOM.    (!) TROUBLE FOLLOWING DIALOGUE IN THE THEATHER.    (!) TROUBLE UNDERSTANDING SOFT OR WHISPERED SPEECH.       Multiple values from one day are sorted in reverse-chronological order         12/19/2024   Driving Risk Screening   Patient/family members have concerns about driving No         12/19/2024   General Alertness/Fatigue Screening   Have you been more tired than usual lately? No         12/19/2024   Urinary Incontinence Screening   Bothered by leaking urine in past 6 months No         12/19/2024   TB Screening   Were  you born outside of the [place]? No         Today's PHQ-2 Score:       12/22/2024     4:40 PM   PHQ-2 ( 1999 [employer])   Q1: Little interest or pleasure in doing things 0   Q2: Feeling down, depressed or hopeless 0   PHQ-2 Score 0    Q1: Little interest or pleasure in doing things Not at all   Q2: Feeling down, depressed or hopeless Not at all   PHQ-2 Score 0       Patient-reported           12/19/2024   Substance Use   Alcohol more than 3/day or more than 7/wk No   Do you have a current opioid prescription? (!) YES   How severe/bad is pain from 1 to 10? 5/10   Do you use any other substances recreationally? No          No data to display              Low Risk (0-3)  Moderate Risk (4-7)  High Risk (>8)  Social History     Tobacco Use    Smoking status: Never    Smokeless tobacco: Never   Vaping Use    Vaping status: Never Used   Substance Use Topics    Alcohol use: Yes    Drug use: No           10/7/2024   LAST FHS-7 RESULTS   1st degree relative breast or ovarian cancer Yes   Any relative bilateral breast cancer No   Any male have breast cancer No   Any ONE woman have BOTH breast AND ovarian cancer No   Any woman with breast cancer before 50yrs No   2 or more relatives with breast AND/OR ovarian cancer No   2 or more relatives with breast AND/OR bowel cancer No        Mammogram Screening - Mammogram every 1-2 years updated in Health Maintenance based on mutual decision making      History of abnormal Pap smear: No - age 65 or older with adequate negative prior screening test results (3 consecutive negative cytology results, 2 consecutive negative cotesting results, or 2 consecutive negative HrHPV test results within 10 years, with the most recent test occurring within the recommended screening interval for the test used)        Latest Ref Rng & Units 8/2/2022     1:47 PM 4/3/2018    12:00 AM 6/14/2016    12:00 AM   PAP / HPV   PAP  Unsatisfactory for evaluation      HPV 16 DNA Negative Negative      HPV 18 DNA Negative  Negative      HPV_EXT - HISTORICAL   See Scanned Report  See Scanned Report    Other HR HPV Negative Negative        ASCVD Risk   The 10-year ASCVD risk score (Galdino CRUZ, et al., 2019) is: 5.8%    Values used to calculate the score:      Age: 64 years      Sex: Female      Is Non- : No      Diabetic: No      Tobacco smoker: No      Systolic Blood Pressure: 138 mmHg      Is BP treated: No      HDL Cholesterol: 84 mg/dL      Total Cholesterol: 234 mg/dL            Reviewed and updated as needed this visit by Provider                    Past Medical History:   Diagnosis Date    Chronic bilateral low back pain with right-sided sciatica 1/4/2019    Chronic constipation 1/4/2019    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    Osteoarthritis     Pain of right hand 1/4/2019    Raynaud's disease without gangrene 1/4/2019     Past Surgical History:   Procedure Laterality Date    BACK SURGERY      HIP SURGERY      PICC  10/25/2020         REMOVE HARDWARE LOWER EXTREMITY  10/22/2020    Procedure: WITH REMOVAL OF METACARPOPHALANGEAL JOINT REPLACEMENT, PLACEMENT OF ANTIBIOTIC SPACER;  Surgeon: Reagan Dumont MD;  Location: [facility] [place];  Service: Orthopedics     BP Readings from Last 3 Encounters:   12/23/24 138/74   03/25/24 130/72   02/26/24 (!) 140/68    Wt Readings from Last 3 Encounters:   12/23/24 58.3 kg (128 lb 9.6 oz)   03/25/24 56.7 kg (125 lb)   02/26/24 57.7 kg (127 lb 3.2 oz)                  Patient Active Problem List   Diagnosis    Congenital absence of vagina    Chronic bilateral low back pain with right-sided sciatica    Raynaud's disease without gangrene    Chronic constipation    Pain of right hand    Narcotic drug use- For chronic back pain- # 30 pills /  month CSA/ [facility]- 08/2021    Controlled substance agreement signed    Abnormal mammogram    Polyarthralgia    Anxiety    PONV (postoperative nausea and vomiting)    On hormone replacement therapy    Backache    Osteoporosis     Chronic, continuous use of opioids     Past Surgical History:   Procedure Laterality Date    BACK SURGERY      HIP SURGERY      PICC  10/25/2020         REMOVE HARDWARE LOWER EXTREMITY  10/22/2020    Procedure: WITH REMOVAL OF METACARPOPHALANGEAL JOINT REPLACEMENT, PLACEMENT OF ANTIBIOTIC SPACER;  Surgeon: Reagan Wright MD;  Location: [facility] [place];  Service: Orthopedics       Social History     Tobacco Use    Smoking status: Never    Smokeless tobacco: Never   Substance Use Topics    Alcohol use: Yes     Family History   Problem Relation Age of Onset    Dementia Mother         86    Cancer Mother     Breast Cancer Mother 60.00    Cancer Maternal Grandmother     Cancer Paternal Grandmother          Current Outpatient Medications   Medication Sig Dispense Refill    azelastine (ASTELIN) 0.1 % nasal spray 2 sprays each nostril 1-2x daily as needed for nasal congestion (use nightly for first 2 week) 30 mL 11    biotin 5 mg Tab [BIOTIN 5 MG TAB] Take 5 mg by mouth daily.      calcium carbonate (OS-MARISSA) 500 mg calcium (1,250 mg) chewable tablet [CALCIUM CARBONATE (OS-MARISSA) 500 MG CALCIUM (1,250 MG) CHEWABLE TABLET] Chew 2 tablets daily.       cholecalciferol, vitamin D3, 5,000 unit Tab [CHOLECALCIFEROL, VITAMIN D3, 5,000 UNIT TAB] Take 5,000 Units by mouth daily.      clobetasol (TEMOVATE) 0.05 % external ointment APPLY A THIN LAYER TO THE AFFECTED AREA(S) daily for 2wks, then prn therafter      docusate sodium (COLACE) 100 MG capsule [DOCUSATE SODIUM (COLACE) 100 MG CAPSULE] Take 300 mg by mouth every evening.      escitalopram (LEXAPRO) 20 MG tablet Take 1 tablet (20 mg) by mouth daily 90 tablet 2    gabapentin (NEURONTIN) 100 MG capsule Take 3 capsules (300 mg) by mouth at bedtime 270 capsule 3    glucosamine/chondr wright A sod (OSTEO BI-FLEX ORAL) [GLUCOSAMINE/CHONDR WRIGHT A SOD (OSTEO BI-FLEX ORAL)] Take 1 tablet by mouth daily.      HYDROcodone-acetaminophen (NORCO) 5-325 MG tablet Take 1 tablet by mouth every 6  hours as needed for severe pain. 30 tablet 0    lysine 500 mg Tab [LYSINE 500 MG TAB] Take 500 mg by mouth daily.      mv-min/iron/folic/calcium/vitK (WOMEN'S 50 PLUS MULTIVIT-IRON ORAL) [MV-MIN/IRON/FOLIC/CALCIUM/VITK (WOMEN'S 50 PLUS MULTIVIT-IRON ORAL)] Take 1 tablet by mouth daily.      naproxen sodium (ALEVE) 220 MG tablet [NAPROXEN SODIUM (ALEVE) 220 MG TABLET] Take 660 mg by mouth every 12 (twelve) hours as needed for pain.      peg 400-propylene glycol (SYSTANE, PROPYLENE GLYCOL,) 0.4-0.3 % Drop [-PROPYLENE GLYCOL (SYSTANE, PROPYLENE GLYCOL,) 0.4-0.3 % DROP] Administer 1 drop to both eyes 4 (four) times a day as needed.      TURMERIC ORAL [TURMERIC ORAL] Take 1 capsule by mouth daily.      valACYclovir (VALTREX) 500 MG tablet Take 1 tablet (500 mg) by mouth daily. 120 tablet 0     Current providers sharing in care for this patient include:  Patient Care Team:  Salima Lockhart MD as PCP - General (Family Medicine)  Salima Lockhart MD as Assigned PCP  Salima Lockhart MD (Family Medicine)  Salima Lockhart MD as Assigned Pain Medication Provider  Charlette Pereira AuD as Audiologist (Audiology)  Celestine Jauregui MD as MD (Otolaryngology)  Celestine Jauregui MD as Assigned Surgical Provider    The following health maintenance items are reviewed in Epic and correct as of today:  Health Maintenance   Topic Date Due    DEXA  Never done    Pneumococcal Vaccine: 50+ Years (1 of 1 - PCV) Never done    ANNUAL REVIEW OF HM ORDERS  07/25/2024    URINE DRUG SCREEN  10/03/2024    CONTROLLED SUBSTANCE AGREEMENT FOR CHRONIC PAIN MANAGEMENT  10/05/2024    PHQ-9  02/26/2025    FARZANA ASSESSMENT  03/25/2025    MEDICARE ANNUAL WELLNESS VISIT  08/19/2025    FALL RISK ASSESSMENT  12/23/2025    GLUCOSE  10/03/2026    MAMMO SCREENING  10/07/2026    LIPID  10/03/2028    ADVANCE CARE PLANNING  03/25/2029    DTAP/TDAP/TD IMMUNIZATION (2 - Td or Tdap) 11/07/2029    COLORECTAL CANCER SCREENING  10/18/2033    RSV VACCINE (1 -  "1-dose 75+ series) 06/23/2034    HEPATITIS C SCREENING  Completed    HIV SCREENING  Completed    PHQ-2 (once per calendar year)  Completed    INFLUENZA VACCINE  Completed    ZOSTER IMMUNIZATION  Completed    COVID-19 Vaccine  Completed    HPV IMMUNIZATION  Aged Out    MENINGITIS IMMUNIZATION  Aged Out    RSV MONOCLONAL ANTIBODY  Aged Out    PAP  Discontinued         Review of Systems  Constitutional, neuro, ENT, endocrine, pulmonary, cardiac, gastrointestinal, genitourinary, musculoskeletal, integument and psychiatric systems are negative, except as otherwise noted.     Objective    Exam  /74 (BP Location: Left arm, Patient Position: Sitting, Cuff Size: Adult Regular)   Pulse 71   Resp 16   Ht 1.651 m (5' 5\")   Wt 58.3 kg (128 lb 9.6 oz)   LMP  (LMP Unknown)   SpO2 100%   BMI 21.40 kg/m     Estimated body mass index is 21.4 kg/m  as calculated from the following:    Height as of this encounter: 1.651 m (5' 5\").    Weight as of this encounter: 58.3 kg (128 lb 9.6 oz).    Physical Exam  GENERAL: alert and no distress  EYES: Eyes grossly normal to inspection, PERRL and conjunctivae and sclerae normal  HENT: ear canals and TM's normal, nose and mouth without ulcers or lesions  NECK: no adenopathy, no asymmetry, masses, or scars  RESP: lungs clear to auscultation - no rales, rhonchi or wheezes  CV: regular rate and rhythm, normal S1 S2, no S3 or S4, no murmur, click or rub, no peripheral edema  ABDOMEN: soft, nontender, no hepatosplenomegaly, no masses and bowel sounds normal  MS: no gross musculoskeletal defects noted, no edema        12/23/2024   Mini Cog   Clock Draw Score 2 Normal   3 Item Recall 2 objects recalled   Mini Cog Total Score 4             Signed Electronically by: Salima Lockhart MD    "

## 2024-12-23 NOTE — LETTER

## 2024-12-23 NOTE — PATIENT INSTRUCTIONS
Patient Education   Preventive Care Advice   This is general advice given by our system to help you stay healthy. However, your care team may have specific advice just for you. Please talk to your care team about your preventive care needs.  Nutrition  Eat 5 or more servings of fruits and vegetables each day.  Try wheat bread, brown rice and whole grain pasta (instead of white bread, rice, and pasta).  Get enough calcium and vitamin D. Check the label on foods and aim for 100% of the RDA (recommended daily allowance).  Lifestyle  Exercise at least 150 minutes each week  (30 minutes a day, 5 days a week).  Do muscle strengthening activities 2 days a week. These help control your weight and prevent disease.  No smoking.  Wear sunscreen to prevent skin cancer.  Have a dental exam and cleaning every 6 months.  Yearly exams  See your health care team every year to talk about:  Any changes in your health.  Any medicines your care team has prescribed.  Preventive care, family planning, and ways to prevent chronic diseases.  Shots (vaccines)   HPV shots (up to age 26), if you've never had them before.  Hepatitis B shots (up to age 59), if you've never had them before.  COVID-19 shot: Get this shot when it's due.  Flu shot: Get a flu shot every year.  Tetanus shot: Get a tetanus shot every 10 years.  Pneumococcal, hepatitis A, and RSV shots: Ask your care team if you need these based on your risk.  Shingles shot (for age 50 and up)  General health tests  Diabetes screening:  Starting at age 35, Get screened for diabetes at least every 3 years.  If you are younger than age 35, ask your care team if you should be screened for diabetes.  Cholesterol test: At age 39, start having a cholesterol test every 5 years, or more often if advised.  Bone density scan (DEXA): At age 50, ask your care team if you should have this scan for osteoporosis (brittle bones).  Hepatitis C: Get tested at least once in your life.  STIs (sexually  transmitted infections)  Before age 24: Ask your care team if you should be screened for STIs.  After age 24: Get screened for STIs if you're at risk. You are at risk for STIs (including HIV) if:  You are sexually active with more than one person.  You don't use condoms every time.  You or a partner was diagnosed with a sexually transmitted infection.  If you are at risk for HIV, ask about PrEP medicine to prevent HIV.  Get tested for HIV at least once in your life, whether you are at risk for HIV or not.  Cancer screening tests  Cervical cancer screening: If you have a cervix, begin getting regular cervical cancer screening tests starting at age 21.  Breast cancer scan (mammogram): If you've ever had breasts, begin having regular mammograms starting at age 40. This is a scan to check for breast cancer.  Colon cancer screening: It is important to start screening for colon cancer at age 45.  Have a colonoscopy test every 10 years (or more often if you're at risk) Or, ask your provider about stool tests like a FIT test every year or Cologuard test every 3 years.  To learn more about your testing options, visit:   .  For help making a decision, visit:   https://bit.ly/uj21724.  Prostate cancer screening test: If you have a prostate, ask your care team if a prostate cancer screening test (PSA) at age 55 is right for you.  Lung cancer screening: If you are a current or former smoker ages 50 to 80, ask your care team if ongoing lung cancer screenings are right for you.  For informational purposes only. Not to replace the advice of your health care provider. Copyright   2023 [facility]. All rights reserved. Clinically reviewed by the [facility] Transitions Program. SupportPay 150916 - REV 01/24.  Preventing Falls: Care Instructions  Injuries and health problems such as trouble walking or poor eyesight can increase your risk of falling. So can some medicines. But there are things you can do to help  "prevent falls. You can exercise to get stronger. You can also arrange your home to make it safer.    Talk to your doctor about the medicines you take. Ask if any of them increase the risk of falls and whether they can be changed or stopped.   Try to exercise regularly. It can help improve your strength and balance. This can help lower your risk of falling.         Practice fall safety and prevention.   Wear low-heeled shoes that fit well and give your feet good support. Talk to your doctor if you have foot problems that make this hard.  Carry a cellphone or wear a medical alert device that you can use to call for help.  Use stepladders instead of chairs to reach high objects. Don't climb if you're at risk for falls. Ask for help, if needed.  Wear the correct eyeglasses, if you need them.        Make your home safer.   Remove rugs, cords, clutter, and furniture from walkways.  Keep your house well lit. Use night-lights in hallways and bathrooms.  Install and use sturdy handrails on stairways.  Wear nonskid footwear, even inside. Don't walk barefoot or in socks without shoes.        Be safe outside.   Use handrails, curb cuts, and ramps whenever possible.  Keep your hands free by using a shoulder bag or backpack.  Try to walk in well-lit areas. Watch out for uneven ground, changes in pavement, and debris.  Be careful in the winter. Walk on the grass or gravel when sidewalks are slippery. Use de-icer on steps and walkways. Add non-slip devices to shoes.    Put grab bars and nonskid mats in your shower or tub and near the toilet. Try to use a shower chair or bath bench when bathing.   Get into a tub or shower by putting in your weaker leg first. Get out with your strong side first. Have a phone or medical alert device in the bathroom with you.   Where can you learn more?  Go to https://www.[employer].net/patiented  Enter G117 in the search box to learn more about \"Preventing Falls: Care Instructions.\"  Current as of: " July 31, 2024  Content Version: 14.3    2024 [employer].   Care instructions adapted under license by your healthcare professional. If you have questions about a medical condition or this instruction, always ask your healthcare professional. [employer] disclaims any warranty or liability for your use of this information.

## 2024-12-26 ENCOUNTER — PATIENT OUTREACH (OUTPATIENT)
Dept: CARE COORDINATION | Facility: CLINIC | Age: 64
End: 2024-12-26
Payer: COMMERCIAL

## 2024-12-26 DIAGNOSIS — R73.9 BLOOD GLUCOSE ELEVATED: Primary | ICD-10-CM

## 2024-12-26 LAB
EST. AVERAGE GLUCOSE BLD GHB EST-MCNC: 151 MG/DL
GABAPENTIN UR QL CFM: PRESENT
HBA1C MFR BLD: 6.9 % (ref 0–5.6)

## 2025-01-02 DIAGNOSIS — F11.90 CHRONIC, CONTINUOUS USE OF OPIOIDS: ICD-10-CM

## 2025-01-02 RX ORDER — HYDROCODONE BITARTRATE AND ACETAMINOPHEN 5; 325 MG/1; MG/1
1 TABLET ORAL EVERY 6 HOURS PRN
Qty: 30 TABLET | Refills: 0 | Status: SHIPPED | OUTPATIENT
Start: 2025-01-02

## 2025-01-17 ENCOUNTER — HOSPITAL ENCOUNTER (OUTPATIENT)
Dept: BONE DENSITY | Facility: HOSPITAL | Age: 65
Discharge: HOME | End: 2025-01-17
Attending: FAMILY MEDICINE | Admitting: FAMILY MEDICINE
Payer: COMMERCIAL

## 2025-01-17 DIAGNOSIS — Z78.0 POSTMENOPAUSAL STATUS: ICD-10-CM

## 2025-01-17 PROCEDURE — 77080 DXA BONE DENSITY AXIAL: CPT

## 2025-01-27 ENCOUNTER — OFFICE VISIT (OUTPATIENT)
Dept: OTOLARYNGOLOGY | Facility: CLINIC | Age: 65
End: 2025-01-27
Payer: COMMERCIAL

## 2025-01-27 DIAGNOSIS — H93.13 TINNITUS, BILATERAL: ICD-10-CM

## 2025-01-27 DIAGNOSIS — K13.79 RECURRENT ORAL ULCERS: ICD-10-CM

## 2025-01-27 DIAGNOSIS — H81.10 BENIGN PAROXYSMAL POSITIONAL VERTIGO, UNSPECIFIED LATERALITY: ICD-10-CM

## 2025-01-27 DIAGNOSIS — T78.1XXA POLLEN-FOOD ALLERGY, INITIAL ENCOUNTER: ICD-10-CM

## 2025-01-27 DIAGNOSIS — J30.0 CHRONIC VASOMOTOR RHINITIS: ICD-10-CM

## 2025-01-27 DIAGNOSIS — Z01.10 NORMAL HEARING NOTED ON EXAMINATION: Primary | ICD-10-CM

## 2025-01-27 DIAGNOSIS — Z98.890 S/P FESS (FUNCTIONAL ENDOSCOPIC SINUS SURGERY): ICD-10-CM

## 2025-01-27 PROCEDURE — 99214 OFFICE O/P EST MOD 30 MIN: CPT | Performed by: OTOLARYNGOLOGY

## 2025-01-27 RX ORDER — IPRATROPIUM BROMIDE 21 UG/1
2 SPRAY, METERED NASAL 3 TIMES DAILY PRN
Qty: 30 ML | Refills: 3 | Status: SHIPPED | OUTPATIENT
Start: 2025-01-27 | End: 2025-02-26

## 2025-01-27 RX ORDER — VALACYCLOVIR HYDROCHLORIDE 500 MG/1
500 TABLET, FILM COATED ORAL DAILY
Qty: 90 TABLET | Refills: 0 | Status: SHIPPED | OUTPATIENT
Start: 2025-01-27 | End: 2025-04-27

## 2025-01-27 NOTE — NURSING NOTE
Sino-Nasal Outcome Test (SNOT - 22)    1. Need to Blow Nose: (Patient-Rptd) (P) Very mild  2. Nasal Blockage: (Patient-Rptd) (P) Very mild  3. Sneezing: (Patient-Rptd) (P) Very mild  4. Runny Nose: (Patient-Rptd) (P) Moderate  5. Cough: (Patient-Rptd) (P) None  6. Post-nasal discharge: (Patient-Rptd) (P) Mild or slight  7. Thick nasal discharge: (Patient-Rptd) (P) None  8. Ear fullness: (Patient-Rptd) (P) Very mild  9. Dizziness: (Patient-Rptd) (P) Very mild  10. Ear Pain: (Patient-Rptd) (P) None  11. Facial pain/pressure: (Patient-Rptd) (P) None  12. Decreased Sense of Smell/Taste: (Patient-Rptd) (P) None  13. Difficulty falling asleep: (Patient-Rptd) (P) None  14. Wake up at night: (Patient-Rptd) (P) None  15. Lack of a good night's sleep: (Patient-Rptd) (P) None  16. Wake up tired: (Patient-Rptd) (P) Very mild  17. Fatigue: (Patient-Rptd) (P) Very mild  18. Reduced Productivity: (Patient-Rptd) (P) None  19. Reduced Concentration: (Patient-Rptd) (P) Very mild  20. Frustrated/restless/irritable: (Patient-Rptd) (P) Very mild  21. Sad: (Patient-Rptd) (P) None  22. Embarrassed: (Patient-Rptd) (P) None    Total Score: (Patient-Rptd) (P) 14    COPYRIGHT 1996. [facility] [place]

## 2025-01-27 NOTE — PATIENT INSTRUCTIONS
You were seen in the ENT Clinic today by Dr. Jauregui. If you have any questions or concerns after your appointment, please contact us (see below).    2.   Please return to clinic as needed    3.   Atrovent nasal spray and Valtrex were sent to your pharmacy    4.   You were referred to the Allergist, they will be contacting you in the next 2 business days to schedule an appointment. If you don't hear from them, please call [phone]     5. You were referred to Occupational Therapy (OT), they will be contacting you in the next 2 business days to schedule an appointment. We recommend scheduling with Kaelyn Andrade. If you don't hear from them, please call [phone]       How to Contact Us:  Send a MRO message to your provider. Our team will respond to you via MRO. Occasionally, we will need to call you to get further information.  For urgent matters (Monday-Friday), call the ENT Clinic: [phone] and speak with a call center team member - they will route your call appropriately.   If you'd like to speak directly with a nurse, please find our contact information below. We do our best to check voicemail frequently throughout the day, and will work to call you back within 1-2 days. For urgent matters, please use the general clinic phone numbers listed above.      Sonia Plaza RN  [facility]  ENT  [facility]  [address] [place] [id number]  [email or web address]  Office: [phone]  Fax: [phone]

## 2025-01-27 NOTE — PROGRESS NOTES
ENT FOLLOW UP VISIT NOTE    Patient presents with:  Ent Problem: 4 mo follow up bilateral tinnitus (normal audio 9/27/24), nasal congestion, and oral ulcers. Tinnitus every now and then but not worse. Nasal congestion sx are the same as last visit, nasal spray has not made a difference. Oral ulcers have gotten better but did change toothpaste along with taking antiviral. Did have 3 sores in mouth/throat last week. 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 Last sinus surgery 2014 in [place]. Wants info of past sinus issues on record. SNOT = 14.       No improvement with astelin spray.   Concern for walnut and strawberry allergy.  Had three cancer sore last week.   Now using Sensodyne toothpaste.   She reports postional vertigo when doing lower back exercises when laying on the floor.       Sino-Nasal Outcome Test (SNOT - 22)    1. Need to Blow Nose: (Patient-Rptd) (P) Very mild  2. Nasal Blockage: (Patient-Rptd) (P) Very mild  3. Sneezing: (Patient-Rptd) (P) Very mild  4. Runny Nose: (Patient-Rptd) (P) Moderate  5. Cough: (Patient-Rptd) (P) None  6. Post-nasal discharge: (Patient-Rptd) (P) Mild or slight  7. Thick nasal discharge: (Patient-Rptd) (P) None  8. Ear fullness: (Patient-Rptd) (P) Very mild  9. Dizziness: (Patient-Rptd) (P) Very mild  10. Ear Pain: (Patient-Rptd) (P) None  11. Facial pain/pressure: (Patient-Rptd) (P) None  12. Decreased Sense of Smell/Taste: (Patient-Rptd) (P) None  13. Difficulty falling asleep: (Patient-Rptd) (P) None  14. Wake up at night: (Patient-Rptd) (P) None  15. Lack of a good night's sleep: (Patient-Rptd) (P) None  16. Wake up tired: (Patient-Rptd) (P) Very mild  17. Fatigue: (Patient-Rptd) (P) Very mild  18. Reduced Productivity: (Patient-Rptd) (P) None  19. Reduced Concentration: (Patient-Rptd) (P) Very mild  20. Frustrated/restless/irritable: (Patient-Rptd) (P) Very mild  21. Sad: (Patient-Rptd) (P) None  22. Embarrassed: (Patient-Rptd) (P) None    Total  Score: (Patient-Rptd) (P) 14    COPYRIGHT . [facility] IN [place], [place]   HISTORY OF PRESENT ILLNESS    Paulina was seen in follow up for recheck after trial of Valtrex for recurrent oral ulcers. and astelin spray for nasal congestion.      My previous note:    rders Placed This Encounter   Medications    valACYclovir (VALTREX) 500 MG tablet       Sig: Take 1 tablet (500 mg) by mouth daily.       Dispense:  120 tablet       Refill:  0    azelastine (ASTELIN) 0.1 % nasal spray       Si sprays each nostril 1-2x daily as needed for nasal congestion (use nightly for first 2 week)       Dispense:  30 mL       Refill:  11       2 sprays each nostril 1-2x daily as needed for nasal congestion (use nightly for first 2 week)      Return visit 4 months for recheck.   Astelin nasal spray as directed  Valtrex for recurrent oral ulcers        ALLERGIES    Reglan [metoclopramide], Reglan [metoclopramide], Erythromycin, Sulfa antibiotics, and Tetracycline    CURRENT MEDICATIONS      Current Outpatient Medications:     azelastine (ASTELIN) 0.1 % nasal spray, 2 sprays each nostril 1-2x daily as needed for nasal congestion (use nightly for first 2 week), Disp: 30 mL, Rfl: 11    biotin 5 mg Tab, [BIOTIN 5 MG TAB] Take 5 mg by mouth daily., Disp: , Rfl:     calcium carbonate (OS-MARISSA) 500 mg calcium (1,250 mg) chewable tablet, [CALCIUM CARBONATE (OS-MARISSA) 500 MG CALCIUM (1,250 MG) CHEWABLE TABLET] Chew 2 tablets daily. , Disp: , Rfl:     cholecalciferol, vitamin D3, 5,000 unit Tab, [CHOLECALCIFEROL, VITAMIN D3, 5,000 UNIT TAB] Take 5,000 Units by mouth daily., Disp: , Rfl:     clobetasol (TEMOVATE) 0.05 % external ointment, APPLY A THIN LAYER TO THE AFFECTED AREA(S) daily for 2wks, then prn therafter, Disp: , Rfl:     docusate sodium (COLACE) 100 MG capsule, [DOCUSATE SODIUM (COLACE) 100 MG CAPSULE] Take 300 mg by mouth every evening., Disp: , Rfl:     escitalopram (LEXAPRO) 20 MG tablet, Take 1 tablet (20 mg) by  mouth daily, Disp: 90 tablet, Rfl: 2    gabapentin (NEURONTIN) 100 MG capsule, Take 3 capsules (300 mg) by mouth at bedtime, Disp: 270 capsule, Rfl: 3    glucosamine/chondr wright A sod (OSTEO BI-FLEX ORAL), [GLUCOSAMINE/CHONDR WRIGHT A SOD (OSTEO BI-FLEX ORAL)] Take 1 tablet by mouth daily., Disp: , Rfl:     HYDROcodone-acetaminophen (NORCO) 5-325 MG tablet, Take 1 tablet by mouth every 6 hours as needed for severe pain., Disp: 30 tablet, Rfl: 0    ipratropium (ATROVENT) 0.03 % nasal spray, Spray 2 sprays into both nostrils 3 times daily as needed for rhinitis (runny nose)., Disp: 30 mL, Rfl: 3    lysine 500 mg Tab, [LYSINE 500 MG TAB] Take 500 mg by mouth daily., Disp: , Rfl:     mv-min/iron/folic/calcium/vitK (WOMEN'S 50 PLUS MULTIVIT-IRON ORAL), [MV-MIN/IRON/FOLIC/CALCIUM/VITK (WOMEN'S 50 PLUS MULTIVIT-IRON ORAL)] Take 1 tablet by mouth daily., Disp: , Rfl:     naproxen sodium (ALEVE) 220 MG tablet, [NAPROXEN SODIUM (ALEVE) 220 MG TABLET] Take 660 mg by mouth every 12 (twelve) hours as needed for pain., Disp: , Rfl:     peg 400-propylene glycol (SYSTANE, PROPYLENE GLYCOL,) 0.4-0.3 % Drop, [-PROPYLENE GLYCOL (SYSTANE, PROPYLENE GLYCOL,) 0.4-0.3 % DROP] Administer 1 drop to both eyes 4 (four) times a day as needed., Disp: , Rfl:     TURMERIC ORAL, [TURMERIC ORAL] Take 1 capsule by mouth daily., Disp: , Rfl:     valACYclovir (VALTREX) 500 MG tablet, Take 1 tablet (500 mg) by mouth daily., Disp: 90 tablet, Rfl: 0    valACYclovir (VALTREX) 500 MG tablet, Take 1 tablet (500 mg) by mouth daily., Disp: 120 tablet, Rfl: 0     PAST MEDICAL HISTORY    PAST MEDICAL HISTORY:   Past Medical History:   Diagnosis Date    Chronic bilateral low back pain with right-sided sciatica 1/4/2019    Chronic constipation 1/4/2019    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    Osteoarthritis     Pain of right hand 1/4/2019    Raynaud's disease without gangrene 1/4/2019       PAST SURGICAL HISTORY    PAST SURGICAL HISTORY:   Past Surgical  History:   Procedure Laterality Date    BACK SURGERY      HIP SURGERY      PICC  10/25/2020         REMOVE HARDWARE LOWER EXTREMITY  10/22/2020    Procedure: WITH REMOVAL OF METACARPOPHALANGEAL JOINT REPLACEMENT, PLACEMENT OF ANTIBIOTIC SPACER;  Surgeon: Reagan Dumont MD;  Location: [facility];  Service: Orthopedics       FAMILY  HISTORY    FAMILY HISTORY:   Family History   Problem Relation Age of Onset    Dementia Mother         86    Cancer Mother     Breast Cancer Mother 60.00    Cancer Maternal Grandmother     Cancer Paternal Grandmother        SOCIAL HISTORY    SOCIAL HISTORY:   Social History     Tobacco Use    Smoking status: Never    Smokeless tobacco: Never   Substance Use Topics    Alcohol use: Yes        PHYSICAL EXAM    HEAD: Normal appearance and symmetry:  No cutaneous lesions.      NECK:  supple     EARS:  Auricles normal without lesions    EYES:  EOMI    CN VII/XII:  intact     NOSE:  patent; septum slight deviated left;  mucosa and inferior turbinates palel, boggy        ORAL CAVITY/OROPHARYNX:     Lips:  Normal.  Tongue: normal, midline  Mucosa:   no lesions     NECK:  Trachea:  midline.        NEURO:   Alert and Oriented        RESPIRATORY:   Symmetry and Respiratory effort     PSYCH:  Normal mood and affect     SKIN:   warm and dry         IMPRESSION:    Encounter Diagnoses   Name Primary?    Normal hearing noted on examination Yes    Tinnitus, bilateral     S/P FESS (functional endoscopic sinus surgery)     Chronic vasomotor rhinitis     Pollen-food allergy, initial encounter     Recurrent oral ulcers     Benign paroxysmal positional vertigo, unspecified laterality           RECOMMENDATIONS:      Orders Placed This Encounter   Procedures    Adult Allergy/Asthma  Referral       Orders Placed This Encounter   Medications    ipratropium (ATROVENT) 0.03 % nasal spray     Sig: Spray 2 sprays into both nostrils 3 times daily as needed for rhinitis (runny nose).     Dispense:  30 mL      Refill:  3    valACYclovir (VALTREX) 500 MG tablet     Sig: Take 1 tablet (500 mg) by mouth daily.     Dispense:  90 tablet     Refill:  0      Return visit as needed.  Consider vestibular rehab in future.

## 2025-01-27 NOTE — LETTER
1/27/2025      Paulina Scott  [address] [facility] [address]      Dear Colleague,    Thank you for referring your patient, Paulina Scott, to the [facility]. Please see a copy of my visit note below.        ENT FOLLOW UP VISIT NOTE    Patient presents with:  Ent Problem: 4 mo follow up bilateral tinnitus (normal audio 9/27/24), nasal congestion, and oral ulcers. Tinnitus every now and then but not worse. Nasal congestion sx are the same as last visit, nasal spray has not made a difference. Oral ulcers have gotten better but did change toothpaste along with taking antiviral. Did have 3 sores in mouth/throat last week. 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 Last sinus surgery 2014 in [place]. Wants info of past sinus issues on record. SNOT = 14.       No improvement with astelin spray.   Concern for walnut and strawberry allergy.  Had three cancer sore last week.   Now using Sensodyne toothpaste.   She reports postional vertigo when doing lower back exercises when laying on the floor.       Sino-Nasal Outcome Test (SNOT - 22)    1. Need to Blow Nose: (Patient-Rptd) (P) Very mild  2. Nasal Blockage: (Patient-Rptd) (P) Very mild  3. Sneezing: (Patient-Rptd) (P) Very mild  4. Runny Nose: (Patient-Rptd) (P) Moderate  5. Cough: (Patient-Rptd) (P) None  6. Post-nasal discharge: (Patient-Rptd) (P) Mild or slight  7. Thick nasal discharge: (Patient-Rptd) (P) None  8. Ear fullness: (Patient-Rptd) (P) Very mild  9. Dizziness: (Patient-Rptd) (P) Very mild  10. Ear Pain: (Patient-Rptd) (P) None  11. Facial pain/pressure: (Patient-Rptd) (P) None  12. Decreased Sense of Smell/Taste: (Patient-Rptd) (P) None  13. Difficulty falling asleep: (Patient-Rptd) (P) None  14. Wake up at night: (Patient-Rptd) (P) None  15. Lack of a good night's sleep: (Patient-Rptd) (P) None  16. Wake up tired: (Patient-Rptd) (P) Very mild  17. Fatigue: (Patient-Rptd) (P) Very mild  18. Reduced  Productivity: (Patient-Rptd) (P) None  19. Reduced Concentration: (Patient-Rptd) (P) Very mild  20. Frustrated/restless/irritable: (Patient-Rptd) (P) Very mild  21. Sad: (Patient-Rptd) (P) None  22. Embarrassed: (Patient-Rptd) (P) None    Total Score: (Patient-Rptd) (P) 14    COPYRIGHT . [facility] IN [place], [place]   HISTORY OF PRESENT ILLNESS    Paulina was seen in follow up for recheck after trial of Valtrex for recurrent oral ulcers. and astelin spray for nasal congestion.      My previous note:    rders Placed This Encounter   Medications     valACYclovir (VALTREX) 500 MG tablet       Sig: Take 1 tablet (500 mg) by mouth daily.       Dispense:  120 tablet       Refill:  0     azelastine (ASTELIN) 0.1 % nasal spray       Si sprays each nostril 1-2x daily as needed for nasal congestion (use nightly for first 2 week)       Dispense:  30 mL       Refill:  11       2 sprays each nostril 1-2x daily as needed for nasal congestion (use nightly for first 2 week)      Return visit 4 months for recheck.   Astelin nasal spray as directed  Valtrex for recurrent oral ulcers        ALLERGIES    Reglan [metoclopramide], Reglan [metoclopramide], Erythromycin, Sulfa antibiotics, and Tetracycline    CURRENT MEDICATIONS      Current Outpatient Medications:      azelastine (ASTELIN) 0.1 % nasal spray, 2 sprays each nostril 1-2x daily as needed for nasal congestion (use nightly for first 2 week), Disp: 30 mL, Rfl: 11     biotin 5 mg Tab, [BIOTIN 5 MG TAB] Take 5 mg by mouth daily., Disp: , Rfl:      calcium carbonate (OS-MARISSA) 500 mg calcium (1,250 mg) chewable tablet, [CALCIUM CARBONATE (OS-MARISSA) 500 MG CALCIUM (1,250 MG) CHEWABLE TABLET] Chew 2 tablets daily. , Disp: , Rfl:      cholecalciferol, vitamin D3, 5,000 unit Tab, [CHOLECALCIFEROL, VITAMIN D3, 5,000 UNIT TAB] Take 5,000 Units by mouth daily., Disp: , Rfl:      clobetasol (TEMOVATE) 0.05 % external ointment, APPLY A THIN LAYER TO THE AFFECTED AREA(S)  daily for 2wks, then prn therafter, Disp: , Rfl:      docusate sodium (COLACE) 100 MG capsule, [DOCUSATE SODIUM (COLACE) 100 MG CAPSULE] Take 300 mg by mouth every evening., Disp: , Rfl:      escitalopram (LEXAPRO) 20 MG tablet, Take 1 tablet (20 mg) by mouth daily, Disp: 90 tablet, Rfl: 2     gabapentin (NEURONTIN) 100 MG capsule, Take 3 capsules (300 mg) by mouth at bedtime, Disp: 270 capsule, Rfl: 3     glucosamine/chondr wright A sod (OSTEO BI-FLEX ORAL), [GLUCOSAMINE/CHONDR WRIGHT A SOD (OSTEO BI-FLEX ORAL)] Take 1 tablet by mouth daily., Disp: , Rfl:      HYDROcodone-acetaminophen (NORCO) 5-325 MG tablet, Take 1 tablet by mouth every 6 hours as needed for severe pain., Disp: 30 tablet, Rfl: 0     ipratropium (ATROVENT) 0.03 % nasal spray, Spray 2 sprays into both nostrils 3 times daily as needed for rhinitis (runny nose)., Disp: 30 mL, Rfl: 3     lysine 500 mg Tab, [LYSINE 500 MG TAB] Take 500 mg by mouth daily., Disp: , Rfl:      mv-min/iron/folic/calcium/vitK (WOMEN'S 50 PLUS MULTIVIT-IRON ORAL), [MV-MIN/IRON/FOLIC/CALCIUM/VITK (WOMEN'S 50 PLUS MULTIVIT-IRON ORAL)] Take 1 tablet by mouth daily., Disp: , Rfl:      naproxen sodium (ALEVE) 220 MG tablet, [NAPROXEN SODIUM (ALEVE) 220 MG TABLET] Take 660 mg by mouth every 12 (twelve) hours as needed for pain., Disp: , Rfl:      peg 400-propylene glycol (SYSTANE, PROPYLENE GLYCOL,) 0.4-0.3 % Drop, [-PROPYLENE GLYCOL (SYSTANE, PROPYLENE GLYCOL,) 0.4-0.3 % DROP] Administer 1 drop to both eyes 4 (four) times a day as needed., Disp: , Rfl:      TURMERIC ORAL, [TURMERIC ORAL] Take 1 capsule by mouth daily., Disp: , Rfl:      valACYclovir (VALTREX) 500 MG tablet, Take 1 tablet (500 mg) by mouth daily., Disp: 90 tablet, Rfl: 0     valACYclovir (VALTREX) 500 MG tablet, Take 1 tablet (500 mg) by mouth daily., Disp: 120 tablet, Rfl: 0     PAST MEDICAL HISTORY    PAST MEDICAL HISTORY:   Past Medical History:   Diagnosis Date     Chronic bilateral low back pain with  right-sided sciatica 1/4/2019     Chronic constipation 1/4/2019     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     Osteoarthritis      Pain of right hand 1/4/2019     Raynaud's disease without gangrene 1/4/2019       PAST SURGICAL HISTORY    PAST SURGICAL HISTORY:   Past Surgical History:   Procedure Laterality Date     BACK SURGERY       HIP SURGERY       PICC  10/25/2020          REMOVE HARDWARE LOWER EXTREMITY  10/22/2020    Procedure: WITH REMOVAL OF METACARPOPHALANGEAL JOINT REPLACEMENT, PLACEMENT OF ANTIBIOTIC SPACER;  Surgeon: Reagan Dumont MD;  Location: [facility];  Service: Orthopedics       FAMILY  HISTORY    FAMILY HISTORY:   Family History   Problem Relation Age of Onset     Dementia Mother         86     Cancer Mother      Breast Cancer Mother 60.00     Cancer Maternal Grandmother      Cancer Paternal Grandmother        SOCIAL HISTORY    SOCIAL HISTORY:   Social History     Tobacco Use     Smoking status: Never     Smokeless tobacco: Never   Substance Use Topics     Alcohol use: Yes        PHYSICAL EXAM    HEAD: Normal appearance and symmetry:  No cutaneous lesions.      NECK:  supple     EARS:  Auricles normal without lesions    EYES:  EOMI    CN VII/XII:  intact     NOSE:  patent; septum slight deviated left;  mucosa and inferior turbinates palel, boggy        ORAL CAVITY/OROPHARYNX:     Lips:  Normal.  Tongue: normal, midline  Mucosa:   no lesions     NECK:  Trachea:  midline.        NEURO:   Alert and Oriented        RESPIRATORY:   Symmetry and Respiratory effort     PSYCH:  Normal mood and affect     SKIN:   warm and dry         IMPRESSION:    Encounter Diagnoses   Name Primary?     Normal hearing noted on examination Yes     Tinnitus, bilateral      S/P FESS (functional endoscopic sinus surgery)      Chronic vasomotor rhinitis      Pollen-food allergy, initial encounter      Recurrent oral ulcers      Benign paroxysmal positional vertigo, unspecified laterality            RECOMMENDATIONS:      Orders Placed This Encounter   Procedures     Adult Allergy/Asthma  Referral       Orders Placed This Encounter   Medications     ipratropium (ATROVENT) 0.03 % nasal spray     Sig: Spray 2 sprays into both nostrils 3 times daily as needed for rhinitis (runny nose).     Dispense:  30 mL     Refill:  3     valACYclovir (VALTREX) 500 MG tablet     Sig: Take 1 tablet (500 mg) by mouth daily.     Dispense:  90 tablet     Refill:  0      Return visit as needed.  Consider vestibular rehab in future.       Again, thank you for allowing me to participate in the care of your patient.        Sincerely,        Celestine Jauregui MD    Electronically signed

## 2025-01-29 ENCOUNTER — THERAPY VISIT (OUTPATIENT)
Dept: OCCUPATIONAL THERAPY | Facility: REHABILITATION | Age: 65
End: 2025-01-29
Attending: OTOLARYNGOLOGY
Payer: COMMERCIAL

## 2025-01-29 DIAGNOSIS — R42 DIZZINESS: Primary | ICD-10-CM

## 2025-01-29 DIAGNOSIS — H81.10 BENIGN PAROXYSMAL POSITIONAL VERTIGO, UNSPECIFIED LATERALITY: ICD-10-CM

## 2025-01-29 DIAGNOSIS — R26.81 UNSTEADINESS: ICD-10-CM

## 2025-01-29 NOTE — PROGRESS NOTES
"OCCUPATIONAL THERAPY EVALUATION  Type of Visit: Evaluation       Fall Risk Screen:  Fall screen completed by: OT  Have you fallen 2 or more times in the past year?: Yes  Have you fallen and had an injury in the past year?: No  Is patient a fall risk?: No  Fall screen comments: Patient has always been \"clumsy\" and states she is not a fall risk. She is here to address dizziness today    Subjective        Presenting condition or subjective complaint: Patient reports that she had initial episode of vertigo and dizziness 2-3 years ago that lasted 3 weeks. She had milder episodes after that and prior to another major 2 week episode in February 2024.  Continues to have mild episodes and then onset of consistent high pitch tinnitus in right ear that sent her to ENT. ENT workup resulted in referral to vestibular rehab. Patient denies ear pain, current tinnitus. Recent audiology exam in September showed normal hearing bilaterally.  History of sinus infections (2014) with surgery to clean them out. 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 Angiogram finally revealed the narrow vertebral arteries.    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    Osteoarthritis     Pain of right hand 1/4/2019    Raynaud's disease without gangrene 1/4/2019     Dates & types of surgery:   See EMR    Prior diagnostic imaging/testing results:      See EMR  Prior therapy history for the same diagnosis, illness or injury: No        Living Environment  Social support: With a significant other or spouse   Type of home: House   Stairs to enter the home: Yes 2 Is there a railing: No     Ramp: No   Stairs inside the home: Yes 8 Is there a railing: Yes     Help at home: None  Equipment owned: Straight Cane "     Employment: No    Hobbies/Interests:      Patient goals for therapy:      Pain assessment: Pain denied     Objective     SENSATION: UE Sensation WNL, LE Sensation WNL    BALANCE: WFL    FUNCTIONAL MOBILITY  Assistive Device(s): None  Ambulation: Independent    BASIC and INSTRUMENTAL ACTIVITIES OF DAILY LIVING (IADL): Patient is independent with ADL and IADL's however she has to be careful of activities and movements due to symptoms      VESTIBULAR EVALUATION  ADDITIONAL HISTORY:  Description of symptoms: Off balance; I feel like I am spinning; Attacks of dizziness  Dizzy attacks:   Start: 2 years ago   Last attack:     Frequency of occurrences: Daily sometimes   Length of attack: Seconds  Difficulty hearing:    Noise in ears? Yes Low buzzing  Alleviates symptoms: Nothing  Worsens symptoms: Sudden movements  Activities that bring on symptoms: Turning while walking; Walking in the dark       DHI: Total Score: (Patient-Rptd) 22      Oculomotor Screen    Ocular ROM Normal   Smooth Pursuit Normal   Saccades Normal     Infrared Goggle Exam Vestibular Suppressant in Last 24 Hours? No  Exam Completed With: Room light   Spontaneous Nystagmus NT   Gaze Evoked Nystagmus Negative   Head Shake Horizontal Nystagmus Negative    Left Right   [place]-Hallpike-modified to sidelying with head at 45 degrees Negative Negative   [facility] Supine Roll Test Negative Negative       Assessment & Plan   CLINICAL IMPRESSIONS  Medical Diagnosis: Benign paroxysmal positional vertigo, unspecified laterality    Treatment Diagnosis: dizziness, unsteadiness    Impression/Assessment: Symptoms with unclear etiology. Plan to instruct on vestibular adaptation and substitution exercises. While that may be helpful in symptom management, it is unlikely to stop the episodes from occurring. Patient may benefit from a VNG to determine if there is vestibular involvement. If there is, then recommend consult with neurology due to history of significant bilateral VBI  and possible relation to ongoing symptoms.    Clinical Decision Making (Complexity):  Assessment of Occupational Performance: 1-3 Performance Deficits  Occupational Performance Limitations: functional mobility  Clinical Decision Making (Complexity): Low complexity    PLAN OF CARE  Treatment Interventions:  Interventions: Self-Care/Home Management, Neuromuscular Re-education    Long Term Goals   OT Goal 1  Goal Description: Patient will perform HEP consistently to decrease symptoms and improve ADL and IADL function  Target Date: 04/23/25      Frequency of Treatment: once a week  Duration of Treatment: 12 weeks     Recommended Referrals to Other Professionals: not at this time. Consider VNG if episodes continue to occur  Education Assessment: Learner/Method: Patient     Risks and benefits of evaluation/treatment have been explained.   Patient agrees with Plan of Care.     Evaluation Time:    OT Eval, Low Complexity Minutes (00633): 40       Signing Clinician: Violeta Andrade OT

## 2025-01-30 DIAGNOSIS — F11.90 CHRONIC, CONTINUOUS USE OF OPIOIDS: ICD-10-CM

## 2025-01-30 RX ORDER — HYDROCODONE BITARTRATE AND ACETAMINOPHEN 5; 325 MG/1; MG/1
1 TABLET ORAL EVERY 6 HOURS PRN
Qty: 30 TABLET | Refills: 0 | Status: SHIPPED | OUTPATIENT
Start: 2025-01-30

## 2025-02-03 ENCOUNTER — OFFICE VISIT (OUTPATIENT)
Dept: ALLERGY | Facility: CLINIC | Age: 65
End: 2025-02-03
Attending: OTOLARYNGOLOGY
Payer: COMMERCIAL

## 2025-02-03 VITALS
HEART RATE: 68 BPM | BODY MASS INDEX: 20.77 KG/M2 | RESPIRATION RATE: 20 BRPM | WEIGHT: 124.8 LBS | OXYGEN SATURATION: 100 %

## 2025-02-03 DIAGNOSIS — J31.0 CHRONIC RHINITIS: ICD-10-CM

## 2025-02-03 PROCEDURE — 99203 OFFICE O/P NEW LOW 30 MIN: CPT | Mod: 25 | Performed by: ALLERGY & IMMUNOLOGY

## 2025-02-03 PROCEDURE — 95004 PERQ TESTS W/ALRGNC XTRCS: CPT | Performed by: ALLERGY & IMMUNOLOGY

## 2025-02-03 NOTE — PROGRESS NOTES
Subjective   Paulina is a 64 year old, presenting for the following health issues:  Allergy Consult (Concern for environmental allergies, referred by Dr. Jauregui)    HPI   Chief complaint: Concern for environmental allergies    History of present illness: This is a pleasant 64-year-old woman accompanied by her  I was asked to see for evaluation of allergies by Dr. Jauregui.  Patient states she was tested for allergies in the early 80s.  At that time she was found to have sinusitis.  It was mistaken for meningitis and she was in the ICU and very sick.  She states she was told she was allergic to dairy at the time despite no hives, swelling or shortness of breath after eating dairy.  She eliminated and then slowly added back into her diet and her symptoms eventually improved.  In 2014 she had recurrent sinusitis presenting initially as a terrible headache or migraine.  Eventually was discovered that she had fungal sinusitis and she had surgery.  No allergy testing was performed at that time.  She has not had a sinus infection like that but she does note watery eyes sneezing drainage and a runny nose.  She was tried on Astelin nasal spray by Dr. Jauregui which failed to improve symptoms.  She also use Zyrtec which she found no difference from the Astelin.  Now on Atrovent nasal spray and notes that seems to help with the runny nose.  No asthma symptoms.  No repeat allergy testing.    Past medical history: Back pain status post surgeries, recurrent aphthous ulcers in the throat    Social history: She is , has cats at home, non-smoker    Family history: She has a sister with sinus infections            Objective    Pulse 68   Resp 20   Wt 56.6 kg (124 lb 12.8 oz)   LMP  (LMP Unknown)   SpO2 100%   BMI 20.77 kg/m    Body mass index is 20.77 kg/m .  Physical Exam   Gen: Pleasant female not in acute distress  HEENT: Eyes no erythema of the bulbar or palpebral conjunctiva, no edema.Nose: No congestion, mucosa  normal. Mouth: Throat clear, no lip or tongue edema.     Psych: Alert and oriented times 3      At today's visit the patient/parent and I engaged in an informed consent discussion about allergy testing.  We discussed skin testing, blood testing,  and the alternative of not undergoing any testing. The patient/ parent has a preference for skin testing. We then discussed the risks and benefits of skin testing.  The patient/ parent understands skin testing risks can include, but are not limited to, urticaria, angioedema, shortness of breath, and severe anaphylaxis.  The benefits include, but are not limited, to evaluation for allergens causing symptoms.  After answering the patients/parents questions they have agreed to proceed with skin testing.    30 percutaneous test were undertaken to the environmental skin test panel.  Positive histamine control with a negative allergy skin test.  Please see scanned photograph.    Impression report and plan:  1.  Rhinitis    Allergy testing is negative.  Food allergy testing is not necessary as symptoms are not IgE mediated.  She will continue with her Atrovent nasal spray and follow with Dr. Jauregui in the future.        Signed Electronically by: Sarah Dang MD

## 2025-02-03 NOTE — LETTER
2/3/2025      Paulina Scott  [address] [place] [address]      Dear Colleague,    Thank you for referring your patient, Paulina Scott, to the [facility]. Please see a copy of my visit note below.          Subjective  Paulina is a 64 year old, presenting for the following health issues:  Allergy Consult (Concern for environmental allergies, referred by Dr. Jauregui)    HPI   Chief complaint: Concern for environmental allergies    History of present illness: This is a pleasant 64-year-old woman accompanied by her  I was asked to see for evaluation of allergies by Dr. Jauregui.  Patient states she was tested for allergies in the early 80s.  At that time she was found to have sinusitis.  It was mistaken for meningitis and she was in the ICU and very sick.  She states she was told she was allergic to dairy at the time despite no hives, swelling or shortness of breath after eating dairy.  She eliminated and then slowly added back into her diet and her symptoms eventually improved.  In 2014 she had recurrent sinusitis presenting initially as a terrible headache or migraine.  Eventually was discovered that she had fungal sinusitis and she had surgery.  No allergy testing was performed at that time.  She has not had a sinus infection like that but she does note watery eyes sneezing drainage and a runny nose.  She was tried on Astelin nasal spray by Dr. Jauregui which failed to improve symptoms.  She also use Zyrtec which she found no difference from the Astelin.  Now on Atrovent nasal spray and notes that seems to help with the runny nose.  No asthma symptoms.  No repeat allergy testing.    Past medical history: Back pain status post surgeries, recurrent aphthous ulcers in the throat    Social history: She is , has cats at home, non-smoker    Family history: She has a sister with sinus infections            Objective   Pulse 68   Resp 20   Wt 56.6 kg (124 lb 12.8 oz)   LMP  (LMP  Unknown)   SpO2 100%   BMI 20.77 kg/m    Body mass index is 20.77 kg/m .  Physical Exam   Gen: Pleasant female not in acute distress  HEENT: Eyes no erythema of the bulbar or palpebral conjunctiva, no edema.Nose: No congestion, mucosa normal. Mouth: Throat clear, no lip or tongue edema.     Psych: Alert and oriented times 3      At today's visit the patient/parent and I engaged in an informed consent discussion about allergy testing.  We discussed skin testing, blood testing,  and the alternative of not undergoing any testing. The patient/ parent has a preference for skin testing. We then discussed the risks and benefits of skin testing.  The patient/ parent understands skin testing risks can include, but are not limited to, urticaria, angioedema, shortness of breath, and severe anaphylaxis.  The benefits include, but are not limited, to evaluation for allergens causing symptoms.  After answering the patients/parents questions they have agreed to proceed with skin testing.    30 percutaneous test were undertaken to the environmental skin test panel.  Positive histamine control with a negative allergy skin test.  Please see scanned photograph.    Impression report and plan:  1.  Rhinitis    Allergy testing is negative.  Food allergy testing is not necessary as symptoms are not IgE mediated.  She will continue with her Atrovent nasal spray and follow with Dr. Jauregui in the future.        Signed Electronically by: Sarah Dang MD      Again, thank you for allowing me to participate in the care of your patient.        Sincerely,        Sarah Dang MD    Electronically signed

## 2025-02-05 ENCOUNTER — MYC MEDICAL ADVICE (OUTPATIENT)
Dept: EDUCATION SERVICES | Facility: CLINIC | Age: 65
End: 2025-02-05

## 2025-02-05 ENCOUNTER — ALLIED HEALTH/NURSE VISIT (OUTPATIENT)
Dept: EDUCATION SERVICES | Facility: CLINIC | Age: 65
End: 2025-02-05
Attending: FAMILY MEDICINE
Payer: COMMERCIAL

## 2025-02-05 VITALS — BODY MASS INDEX: 20.14 KG/M2 | WEIGHT: 121 LBS

## 2025-02-05 DIAGNOSIS — E11.9 DIABETES MELLITUS, NEW ONSET (H): ICD-10-CM

## 2025-02-05 RX ORDER — LANCETS 30 GAUGE
1 EACH MISCELLANEOUS DAILY
Qty: 200 EACH | Refills: 6 | Status: SHIPPED | OUTPATIENT
Start: 2025-02-05

## 2025-02-05 NOTE — PATIENT INSTRUCTIONS
1. Eat 3 balanced meals each day - Monitor carb intake and limit to 45-60 grams per meal  This would be equal to 3-4 choices ~  1 choice = 15 grams    Do not wait longer than 4-5 hours to eat something  Snacks limit to no more than 30 grams of carbohydrates or 2 choices  Make sure you include protein source with each meal and at bedtime - this has been shown to help with blood glucose elevations    2.  Check blood sugars once each day - please try and alternate the times you check between am fasting( right after you wake before eating) and 2 hours AFTER dinner - this allows us to get a better idea of what is happening throughout your day , not at just one time     Fasting and before meal target is 80 - 130   2 hours after a meal target is < 180  remember to bring meter and log book to all appointments    3. Incorporate 30 minutes activity into each day - does not need to be all at one time & walking counts    ReliOn meter - strips - and lancets       Thank you for coming in to see me today !      I value your experience and would be very thankful for your time in providing feedback in our clinic survey.    You may receive an e-mail, text message or even something in the mail from [employer].  This is a survey to let us know how we are doing - the survey will be related to your diabetes education and me.

## 2025-02-05 NOTE — LETTER
2/5/2025         RE: Paulina Soctt  [address] [facility] [facility] [place] [address]        Dear Colleague,    Thank you for referring your patient, Paulina Scott, to the [facility]. Please see a copy of my visit note below.    Diabetes Self-Management Education & Support    Presents for: Initial Assessment for new diagnosis    Type of Service: In Person Visit      Assessment  Paulina is a very pleasant 64-year-old who comes to clinic today to receive initial education for new diagnosis of DMT2.  She arrived today accompanied by her  Hu.  Thus far no diabetes medications have been prescribed for Paulina and she will be working on managing her glycemic control through diet and activity level.  I did however review with her metformin -indicating it would most likely be our first adjunct therapy if she is unable to successfully keep her A1c meeting ADA target.  Glucose testing supplies were ordered for her today and education was provided using a demo meter from clinic.  Prior to her visit today I did review her most recent office visit notes as well as lab results.  Paulina was very receptive to all the information/education that was provided to her today and we will follow-up back in clinic next month.  I did instruct her to call with any questions or concerns that come up before that time.    Patient's most recent   Lab Results   Component Value Date    A1C 6.9 12/23/2024     is meeting goal of <7.0    Diabetes knowledge and skills assessment:       Based on learning assessment above, most appropriate setting for further diabetes education would be: Individual setting.    PLAN  1. Eat 3 balanced meals each day - Monitor carb intake and limit to 45-60 grams per meal  This would be equal to 3-4 choices ~  1 choice = 15 grams    Do not wait longer than 4-5 hours to eat something  Snacks limit to no more than 30 grams of carbohydrates or 2 choices  Make sure you include protein source  with each meal and at bedtime - this has been shown to help with blood glucose elevations    2.  Check blood sugars once each day - please try and alternate the times you check between am fasting( right after you wake before eating) and 2 hours AFTER dinner - this allows us to get a better idea of what is happening throughout your day , not at just one time     Fasting and before meal target is 80 - 130   2 hours after a meal target is < 180  remember to bring meter and log book to all appointments    3. Incorporate 30 minutes activity into each day - does not need to be all at one time & walking counts    4.  Advised her to go to [facility] to buy [employer] testing supplies if her formulary does not cover testing supplies as this would be the most cost effective    Topics to cover at upcoming visits: Healthy Eating, Being Active, Monitoring, Problem Solving, Reducing Risks, and Healthy Coping    Follow-up: 3/19/25    See Care Plan for co-developed, patient-state behavior change goals.  AVS provided for patient today.    Education Materials Provided:  -  [employer] Healthy Living with Diabetes Book  - My Plate Planner   - Blood Glucose Log Sheet   - Goals for Your Diabetes Care   - Types of Diabetes Medicines  - ADCES Diabetes Distress handout      SUBJECTIVE/OBJECTIVE:  Presents for: Initial Assessment for new diagnosis  Accompanied by: Self  Diabetes education in the past 24 mo: No  Focus of Visit: Monitoring, Reducing Risks, Taking Medication, Healthy Coping, Healthy Eating, Problem Solving, Being Active, Diabetes Pathophysiology, Assistance w/ making life changes, Self-care behavioral goal setting  Diabetes type: Type 2  Please elaborate:: (Patient-Rptd) A1c of 6.9  Date of diagnosis: 2024  Disease course: (Patient-Rptd) Other (see Comments)  Please elaborate:: (Patient-Rptd) Undiagnosed diabetes  How confident are you filling out medical forms by yourself:: Extremely  Transportation concerns: No  Other  "concerns:: None  Cultural Influences/Ethnic Background:  Not  or       Diabetes Symptoms & Complications:  Diabetes Related Symptoms: None  Weight trend: Stable  Symptom course: Stable  Disease course: (Patient-Rptd) Other (see Comments)  Please elaborate:: (Patient-Rptd) Undiagnosed diabetes  Complications assessed today?: Yes  Autonomic neuropathy: No  CVA: No  Heart disease: No  Nephropathy: No  Peripheral neuropathy: No  Peripheral Vascular Disease: No  Retinopathy: No    Patient Problem List and Family Medical History reviewed for relevant medical history, current medical status, and diabetes risk factors.    Vitals:  Wt 54.9 kg (121 lb)   LMP  (LMP Unknown)   BMI 20.14 kg/m    Estimated body mass index is 20.14 kg/m  as calculated from the following:    Height as of 12/23/24: 1.651 m (5' 5\").    Weight as of this encounter: 54.9 kg (121 lb).   Last 3 BP:   BP Readings from Last 3 Encounters:   12/23/24 138/74   03/25/24 130/72   02/26/24 (!) 140/68       History   Smoking Status     Never   Smokeless Tobacco     Never       Labs:  Lab Results   Component Value Date    A1C 6.9 12/23/2024     Lab Results   Component Value Date     12/23/2024     08/16/2021     Lab Results   Component Value Date     12/23/2024     Direct Measure HDL   Date Value Ref Range Status   12/23/2024 95 >=50 mg/dL Final   ]  GFR Estimate   Date Value Ref Range Status   12/23/2024 >90 >60 mL/min/1.73m2 Final     Comment:     eGFR calculated using 2021 CKD-EPI equation.   12/17/2020 >60 >60 mL/min/1.73m2 Final     GFR Estimate If Black   Date Value Ref Range Status   12/17/2020 >60 >60 mL/min/1.73m2 Final     Lab Results   Component Value Date    CR 0.69 12/23/2024     No results found for: \"MICROALBUMIN\"    Healthy Eating:  Healthy Eating Assessed Today: Yes  Cultural/Cheondoism diet restrictions?: No  Do you have any food allergies or intolerances?: No  Meal planning/habits: Other (avoiding white - " "processed foods)  Please elaborate:: avoiding white - processed foods  Who cooks/prepares meals for you?: Self  Who purchases food in  your home?: Self  How many times a week on average do you eat food made away from home (restaurant/take-out)?: 2  Meals include: Breakfast, Dinner, Afternoon Snack, Evening Snack  Breakfast: 730 - 8 am : coffee with FF creamer    later will have 1/2 banana and 1 slice bread  Lunch: seeds/nuts   \"samira bites\" or a frozen dinner  Dinner: + protein + vegetable  ex= chix soft taco, wild brown rice pilaf  Snacks: chips, tortilla chips, wheat thins   pm apple w/ PB , carrots w/ PB or celery w/ PB  Beverages: Water, Tea, Coffee, Diet soda, Alcohol, Other (see Comments), Sports drinks  Please elaborate:: capt neri / diet SERVANDO Nixon or Dora  Has patient met with a dietitian in the past?: No    Being Active:  Being Active Assessed Today: Yes  Exercise:: Yes  Days per week of moderate to strenuous exercise (like a brisk walk): 5 (gym 3x/week , walks dog daily and swims in the summer)  On average, minutes per day of exercise at this level: 40  Exercise Minutes per Week: 200  Barrier to exercise: None    Monitoring:  Blood Glucose Meter: Unknown  Times checking blood sugar at home (number): Never        Taking Medications:      Current Treatments: None, Diet    Problem Solving:  Problem Solving Assessed Today: Yes  Is the patient at risk for hypoglycemia?: No  Is the patient at risk for DKA?: No  Does patient have severe weather/disaster plan for diabetes management?: Not Needed  Does patient have sick day plan for diabetes management?: Not Needed              Reducing Risks:  Reducing Risks Assessed Today: Yes  Diabetes Risks: Age over 45 years, Family History  CAD Risks: Diabetes Mellitus, Post-menopausal  Has dilated eye exam at least once a year?: Yes  Sees dentist every 6 months?: Yes  Feet checked by healthcare provider in the last year?: Yes    Healthy Coping:  Healthy Coping " Assessed Today: Yes  Emotional response to diabetes: Ready to learn  Informal Support system:: None  Stage of change: PREPARATION (Decided to change - considering how)  Support resources: In-person Offerings  Patient Activation Measure Survey Score:       No data to display                  Care Plan and Education Provided:  Healthy Eating: Balanced meals, Carbohydrate Counting, Consistency in amount and timing of carbohydrate intake, Eating out, Label reading, and Portion control, Being Active: Amount recommended (150 minutes moderate or 75 minutes vigorous activity and 2-3 days strength training per week), Finding a physical activity routine that works for you, and Relationship of activity to glucose, Monitoring: Frequency of monitoring, Individual glucose targets, and Purpose, Taking Medication: Action of prescribed medication(s), Problem Solving: High glucose - causes, signs/symptoms, treatment and prevention and Low glucose - causes, signs/symptoms, treatment and prevention, Reducing Risks: Complications of diabetes, Eye care, Goal for A1c, how it relates to glucose and how often to check, Preventing cardiovascular disease, including blood pressure goals, lipid goals, recommendations for cardioprotective medications, statins, and aspirin, Prevention, early diagnostic measures and treatment of complications, and A1C - goals, relating to blood glucose levels, how often to check, and Healthy Coping: Benefits of making appropriate lifestyle changes, Identifying helpful resources, Recognize feelings about diagnosis, and Utilize support systems    Thank you,  Calista Ornelas RN CDCES  Certified Diabetes Care and   Visit type : DSMT      Time Spent: 60 minutes  Encounter Type: Individual    Any diabetes medication dose changes were made via the CDE Protocol per the patient's referring provider and primary care provider. A copy of this encounter was shared with the provider.   Much or all of the text  in this note was generated through the use of the Dragon Dictate voice-to-text software.Errors in spelling or words which seem out of context are unintentional. Sound alike errors, in particular, may have escaped editing.  Answers submitted by the patient for this visit:  Questionnaire about: Diabetes problem (Submitted on 1/31/2025)  Chief Complaint: Diabetes problem

## 2025-02-10 DIAGNOSIS — F32.A DEPRESSION: ICD-10-CM

## 2025-02-10 RX ORDER — ESCITALOPRAM OXALATE 20 MG/1
20 TABLET ORAL DAILY
Qty: 90 TABLET | Refills: 2 | Status: SHIPPED | OUTPATIENT
Start: 2025-02-10 | End: 2025-02-12

## 2025-02-10 NOTE — TELEPHONE ENCOUNTER
The patient called, stating that she has one week left of her Lexapro and will be leaving town on Saturday, returning in a week. She is requesting a refill to be sent to [facility] in [place]. The mail-order pharmacy notified her that they will need a refill from Dr. Lockhart, and the prescription will not arrive before she leaves. She needs an urgent refill sent to a local pharmacy in the meantime.

## 2025-02-10 NOTE — PROGRESS NOTES
Diabetes Self-Management Education & Support    Presents for: Initial Assessment for new diagnosis    Type of Service: In Person Visit      Assessment  Paulina is a very pleasant 64-year-old who comes to clinic today to receive initial education for new diagnosis of DMT2.  She arrived today accompanied by her  Hu.  Thus far no diabetes medications have been prescribed for Paulina and she will be working on managing her glycemic control through diet and activity level.  I did however review with her metformin -indicating it would most likely be our first adjunct therapy if she is unable to successfully keep her A1c meeting ADA target.  Glucose testing supplies were ordered for her today and education was provided using a demo meter from clinic.  Prior to her visit today I did review her most recent office visit notes as well as lab results.  Paulina was very receptive to all the information/education that was provided to her today and we will follow-up back in clinic next month.  I did instruct her to call with any questions or concerns that come up before that time.    Patient's most recent   Lab Results   Component Value Date    A1C 6.9 12/23/2024     is meeting goal of <7.0    Diabetes knowledge and skills assessment:       Based on learning assessment above, most appropriate setting for further diabetes education would be: Individual setting.    PLAN  1. Eat 3 balanced meals each day - Monitor carb intake and limit to 45-60 grams per meal  This would be equal to 3-4 choices ~  1 choice = 15 grams    Do not wait longer than 4-5 hours to eat something  Snacks limit to no more than 30 grams of carbohydrates or 2 choices  Make sure you include protein source with each meal and at bedtime - this has been shown to help with blood glucose elevations    2.  Check blood sugars once each day - please try and alternate the times you check between am fasting( right after you wake before eating) and 2 hours AFTER dinner -  this allows us to get a better idea of what is happening throughout your day , not at just one time     Fasting and before meal target is 80 - 130   2 hours after a meal target is < 180  remember to bring meter and log book to all appointments    3. Incorporate 30 minutes activity into each day - does not need to be all at one time & walking counts    4.  Advised her to go to [email or web address] to buy [employer] brand testing supplies if her formulary does not cover testing supplies as this would be the most cost effective    Topics to cover at upcoming visits: Healthy Eating, Being Active, Monitoring, Problem Solving, Reducing Risks, and Healthy Coping    Follow-up: 3/19/25    See Care Plan for co-developed, patient-state behavior change goals.  AVS provided for patient today.    Education Materials Provided:  - [employer] Healthy Living with Diabetes Book  - My Plate Planner   - Blood Glucose Log Sheet   - Goals for Your Diabetes Care   - Types of Diabetes Medicines  - ADCES Diabetes Distress handout      SUBJECTIVE/OBJECTIVE:  Presents for: Initial Assessment for new diagnosis  Accompanied by: Self  Diabetes education in the past 24 mo: No  Focus of Visit: Monitoring, Reducing Risks, Taking Medication, Healthy Coping, Healthy Eating, Problem Solving, Being Active, Diabetes Pathophysiology, Assistance w/ making life changes, Self-care behavioral goal setting  Diabetes type: Type 2  Please elaborate:: (Patient-Rptd) A1c of 6.9  Date of diagnosis: 2024  Disease course: (Patient-Rptd) Other (see Comments)  Please elaborate:: (Patient-Rptd) Undiagnosed diabetes  How confident are you filling out medical forms by yourself:: Extremely  Transportation concerns: No  Other concerns:: None  Cultural Influences/Ethnic Background:  Not  or       Diabetes Symptoms & Complications:  Diabetes Related Symptoms: None  Weight trend: Stable  Symptom course: Stable  Disease course: (Patient-Rptd) Other (see Comments)  Please  "elaborate:: (Patient-Rptd) Undiagnosed diabetes  Complications assessed today?: Yes  Autonomic neuropathy: No  CVA: No  Heart disease: No  Nephropathy: No  Peripheral neuropathy: No  Peripheral Vascular Disease: No  Retinopathy: No    Patient Problem List and Family Medical History reviewed for relevant medical history, current medical status, and diabetes risk factors.    Vitals:  Wt 54.9 kg (121 lb)   LMP  (LMP Unknown)   BMI 20.14 kg/m    Estimated body mass index is 20.14 kg/m  as calculated from the following:    Height as of 12/23/24: 1.651 m (5' 5\").    Weight as of this encounter: 54.9 kg (121 lb).   Last 3 BP:   BP Readings from Last 3 Encounters:   12/23/24 138/74   03/25/24 130/72   02/26/24 (!) 140/68       History   Smoking Status    Never   Smokeless Tobacco    Never       Labs:  Lab Results   Component Value Date    A1C 6.9 12/23/2024     Lab Results   Component Value Date     12/23/2024     08/16/2021     Lab Results   Component Value Date     12/23/2024     Direct Measure HDL   Date Value Ref Range Status   12/23/2024 95 >=50 mg/dL Final   ]  GFR Estimate   Date Value Ref Range Status   12/23/2024 >90 >60 mL/min/1.73m2 Final     Comment:     eGFR calculated using 2021 CKD-EPI equation.   12/17/2020 >60 >60 mL/min/1.73m2 Final     GFR Estimate If Black   Date Value Ref Range Status   12/17/2020 >60 >60 mL/min/1.73m2 Final     Lab Results   Component Value Date    CR 0.69 12/23/2024     No results found for: \"MICROALBUMIN\"    Healthy Eating:  Healthy Eating Assessed Today: Yes  Cultural/Confucianism diet restrictions?: No  Do you have any food allergies or intolerances?: No  Meal planning/habits: Other (avoiding white - processed foods)  Please elaborate:: avoiding white - processed foods  Who cooks/prepares meals for you?: Self  Who purchases food in  your home?: Self  How many times a week on average do you eat food made away from home (restaurant/take-out)?: 2  Meals include: " "Breakfast, Dinner, Afternoon Snack, Evening Snack  Breakfast: 730 - 8 am : coffee with FF creamer    later will have 1/2 banana and 1 slice bread  Lunch: seeds/nuts   \"samira bites\" or a frozen dinner  Dinner: + protein + vegetable  ex= chix soft taco, wild brown rice pilaf  Snacks: chips, tortilla chips, wheat thins   pm apple w/ PB , carrots w/ PB or celery w/ PB  Beverages: Water, Tea, Coffee, Diet soda, Alcohol, Other (see Comments), Sports drinks  Please elaborate:: capt neri / diet SERVANDO Nixon or Dora  Has patient met with a dietitian in the past?: No    Being Active:  Being Active Assessed Today: Yes  Exercise:: Yes  Days per week of moderate to strenuous exercise (like a brisk walk): 5 (gym 3x/week , walks dog daily and swims in the summer)  On average, minutes per day of exercise at this level: 40  Exercise Minutes per Week: 200  Barrier to exercise: None    Monitoring:  Blood Glucose Meter: Unknown  Times checking blood sugar at home (number): Never        Taking Medications:      Current Treatments: None, Diet    Problem Solving:  Problem Solving Assessed Today: Yes  Is the patient at risk for hypoglycemia?: No  Is the patient at risk for DKA?: No  Does patient have severe weather/disaster plan for diabetes management?: Not Needed  Does patient have sick day plan for diabetes management?: Not Needed              Reducing Risks:  Reducing Risks Assessed Today: Yes  Diabetes Risks: Age over 45 years, Family History  CAD Risks: Diabetes Mellitus, Post-menopausal  Has dilated eye exam at least once a year?: Yes  Sees dentist every 6 months?: Yes  Feet checked by healthcare provider in the last year?: Yes    Healthy Coping:  Healthy Coping Assessed Today: Yes  Emotional response to diabetes: Ready to learn  Informal Support system:: None  Stage of change: PREPARATION (Decided to change - considering how)  Support resources: In-person Offerings  Patient Activation Measure Survey Score:       No data to " display                  Care Plan and Education Provided:  Healthy Eating: Balanced meals, Carbohydrate Counting, Consistency in amount and timing of carbohydrate intake, Eating out, Label reading, and Portion control, Being Active: Amount recommended (150 minutes moderate or 75 minutes vigorous activity and 2-3 days strength training per week), Finding a physical activity routine that works for you, and Relationship of activity to glucose, Monitoring: Frequency of monitoring, Individual glucose targets, and Purpose, Taking Medication: Action of prescribed medication(s), Problem Solving: High glucose - causes, signs/symptoms, treatment and prevention and Low glucose - causes, signs/symptoms, treatment and prevention, Reducing Risks: Complications of diabetes, Eye care, Goal for A1c, how it relates to glucose and how often to check, Preventing cardiovascular disease, including blood pressure goals, lipid goals, recommendations for cardioprotective medications, statins, and aspirin, Prevention, early diagnostic measures and treatment of complications, and A1C - goals, relating to blood glucose levels, how often to check, and Healthy Coping: Benefits of making appropriate lifestyle changes, Identifying helpful resources, Recognize feelings about diagnosis, and Utilize support systems    Thank you,  Calista Ornelas RN [facility]  Certified Diabetes Care and   Visit type : DSMT      Time Spent: 60 minutes  Encounter Type: Individual    Any diabetes medication dose changes were made via the CDE Protocol per the patient's referring provider and primary care provider. A copy of this encounter was shared with the provider.   Much or all of the text in this note was generated through the use of the Dragon Dictate voice-to-text software.Errors in spelling or words which seem out of context are unintentional. Sound alike errors, in particular, may have escaped editing.  Answers submitted by the patient for this  visit:  Questionnaire about: Diabetes problem (Submitted on 1/31/2025)  Chief Complaint: Diabetes problem

## 2025-02-12 DIAGNOSIS — F32.A DEPRESSION: ICD-10-CM

## 2025-02-12 RX ORDER — ESCITALOPRAM OXALATE 20 MG/1
20 TABLET ORAL DAILY
Qty: 90 TABLET | Refills: 2 | Status: SHIPPED | OUTPATIENT
Start: 2025-02-12

## 2025-02-27 DIAGNOSIS — F11.90 CHRONIC, CONTINUOUS USE OF OPIOIDS: ICD-10-CM

## 2025-02-27 RX ORDER — HYDROCODONE BITARTRATE AND ACETAMINOPHEN 5; 325 MG/1; MG/1
1 TABLET ORAL EVERY 6 HOURS PRN
Qty: 30 TABLET | Refills: 0 | Status: SHIPPED | OUTPATIENT
Start: 2025-02-27 | End: 2025-03-28

## 2025-03-03 ASSESSMENT — PATIENT HEALTH QUESTIONNAIRE - PHQ9
10. IF YOU CHECKED OFF ANY PROBLEMS, HOW DIFFICULT HAVE THESE PROBLEMS MADE IT FOR YOU TO DO YOUR WORK, TAKE CARE OF THINGS AT HOME, OR GET ALONG WITH OTHER PEOPLE: NOT DIFFICULT AT ALL
SUM OF ALL RESPONSES TO PHQ QUESTIONS 1-9: 0
SUM OF ALL RESPONSES TO PHQ QUESTIONS 1-9: 0

## 2025-03-04 ENCOUNTER — OFFICE VISIT (OUTPATIENT)
Dept: FAMILY MEDICINE | Facility: CLINIC | Age: 65
End: 2025-03-04
Payer: COMMERCIAL

## 2025-03-04 VITALS
RESPIRATION RATE: 16 BRPM | DIASTOLIC BLOOD PRESSURE: 68 MMHG | SYSTOLIC BLOOD PRESSURE: 125 MMHG | WEIGHT: 124.6 LBS | OXYGEN SATURATION: 98 % | HEART RATE: 75 BPM | BODY MASS INDEX: 20.76 KG/M2 | HEIGHT: 65 IN

## 2025-03-04 DIAGNOSIS — M20.21 HALLUX RIGIDUS OF RIGHT FOOT: ICD-10-CM

## 2025-03-04 DIAGNOSIS — E78.5 HYPERLIPIDEMIA LDL GOAL <70: ICD-10-CM

## 2025-03-04 DIAGNOSIS — E11.9 DIABETES MELLITUS, NEW ONSET (H): ICD-10-CM

## 2025-03-04 DIAGNOSIS — Z01.818 PRE-OP EXAM: Primary | ICD-10-CM

## 2025-03-04 PROCEDURE — G2211 COMPLEX E/M VISIT ADD ON: HCPCS | Performed by: FAMILY MEDICINE

## 2025-03-04 PROCEDURE — 3074F SYST BP LT 130 MM HG: CPT | Performed by: FAMILY MEDICINE

## 2025-03-04 PROCEDURE — 93005 ELECTROCARDIOGRAM TRACING: CPT | Performed by: FAMILY MEDICINE

## 2025-03-04 PROCEDURE — 99214 OFFICE O/P EST MOD 30 MIN: CPT | Performed by: FAMILY MEDICINE

## 2025-03-04 PROCEDURE — 3078F DIAST BP <80 MM HG: CPT | Performed by: FAMILY MEDICINE

## 2025-03-04 RX ORDER — ATORVASTATIN CALCIUM 10 MG/1
10 TABLET, FILM COATED ORAL DAILY
Qty: 90 TABLET | Refills: 3 | Status: SHIPPED | OUTPATIENT
Start: 2025-03-04

## 2025-03-04 NOTE — PROGRESS NOTES
Preoperative Evaluation  [facility]  [address] [facility] [address]  Phone: [phone]  Fax: [phone]  Primary Provider: Salima Lockhart MD  Pre-op Performing Provider: Salima Lockhart MD  Mar 4, 2025             3/3/2025   Surgical Information   What procedure is being done? Pre surgery check up   Facility or Hospital where procedure/surgery will be performed: [facility]   Who is doing the procedure / surgery? Dr Dejesus   Date of surgery / procedure: March 18   Time of surgery / procedure: U/k   Where do you plan to recover after surgery? at home with family     Fax number for surgical facility: [phone]    Assessment & Plan     ICD-10-CM    1. Pre-op exam  Z01.818 EKG 12-lead, tracing only      2. Diabetes mellitus, new onset (H)  E11.9 Albumin Random Urine Quantitative with Creat Ratio     atorvastatin (LIPITOR) 10 MG tablet     Lipid panel reflex to direct LDL Fasting     ALT      3. Hyperlipidemia LDL goal <70  E78.5       4. Hallux rigidus of right foot  M20.21               The proposed surgical procedure is considered INTERMEDIATE risk.        - No identified additional risk factors other than previously addressed    Preoperative Medication Instructions  Antiplatelet or Anticoagulation Medication Instructions   - We reviewed the medication list and the patient is not on an antiplatelet or anticoagulation medications.    Additional Medication Instructions  Take all scheduled medications on the day of surgery EXCEPT for modifications listed below:   - Herbal medications and vitamins: DO NOT TAKE 14 days prior to surgery.    Recommendation  Approval given to proceed with proposed procedure, without further diagnostic evaluation.    Other issues addressed  1: New onset of type 2 diabetes: Continue lifestyle modification.  Recheck A1c in 3 to 6 months.  She already had an eye exam done December 2024.  We will obtain records from  [facility].  2: Hyperlipidemia: Discussed that diabetes is considered vascular disease.  For optimal control she should be on statin.  She is agreeable.  Statin prescription sent to the pharmacy.  recheck lipid panel 6 weeks after starting medication for titration of dose if needed.  Patient verbalizes understanding.    The longitudinal plan of care for the diagnosis(es)/condition(s) as documented were addressed during this visit. Due to the added complexity in care, I will continue to support Paulina in the subsequent management and with ongoing continuity of care.      Subjective   Paulina is a 64 year old, presenting for the following:  Pre-Op Exam (DOS 03/18/2025, Right foot great toe, @ [facility] in [facility] with Dr. Dejesus )    Answers submitted by the patient for this visit:  Patient Health Questionnaire (Submitted on 3/3/2025)  If you checked off any problems, how difficult have these problems made it for you to do your work, take care of things at home, or get along with other people?: Not difficult at all  PHQ9 TOTAL SCORE: 0        3/4/2025    10:50 AM   Additional Questions   Roomed by Kellie Ortiz CMA     Via the Health Maintenance questionnaire, the patient has reported the following services have been completed -Eye Exam: [facility] 2024-12-23, this information has been sent to the abstraction team.  HPI: Patient undergoing joint fusion surgery for hallux rigidus.  She informs me that her cartilage is almost gone and she is limping with walking.  She follows with [facility]  She is surprised with her new diagnosis of diabetes type 2.  She is following with diabetes educator and learning diet modification.          3/3/2025   Pre-Op Questionnaire   Have you ever had a heart attack or stroke? No   Have you ever had surgery on your heart or blood vessels, such as a stent placement, a coronary artery bypass, or surgery on an artery in your head, neck, heart, or legs? No   Do you have  chest pain with activity? No   Do you have a history of heart failure? No   Do you currently have a cold, bronchitis or symptoms of other infection? No   Do you have a cough, shortness of breath, or wheezing? No   Do you or anyone in your family have previous history of blood clots? No   Do you or does anyone in your family have a serious bleeding problem such as prolonged bleeding following surgeries or cuts? No   Have you ever had problems with anemia or been told to take iron pills? No   Have you had any abnormal blood loss such as black, tarry or bloody stools, or abnormal vaginal bleeding? No   Have you ever had a blood transfusion? No   Are you willing to have a blood transfusion if it is medically needed before, during, or after your surgery? Yes   Have you or any of your relatives ever had problems with anesthesia? No   Do you have sleep apnea, excessive snoring or daytime drowsiness? No   Do you have any artifical heart valves or other implanted medical devices like a pacemaker, defibrillator, or continuous glucose monitor? No   Do you have artificial joints? (!) YES   Are you allergic to latex? No     Health Care Directive  Patient does not have a Health Care Directive: Patient states has Advance Directive and will bring in a copy to clinic.    Preoperative Review of    reviewed - controlled substances reflected in medication list.      Status of Chronic Conditions:  See problem list for active medical problems.  Problems all longstanding and stable, except as noted/documented.  See ROS for pertinent symptoms related to these conditions.    Patient Active Problem List    Diagnosis Date Noted    Chronic, continuous use of opioids 02/26/2024     Priority: Medium    Backache 08/16/2021     Priority: Medium    Osteoporosis 08/16/2021     Priority: Medium    PONV (postoperative nausea and vomiting) 09/11/2020     Priority: Medium    Polyarthralgia 07/02/2020     Priority: Medium    Anxiety 07/02/2020      Priority: Medium    Abnormal mammogram 05/13/2019     Priority: Medium    Narcotic drug use- For chronic back pain- # 30 pills /  month CSA/ DAM- 08/2021 02/18/2019     Priority: Medium    Controlled substance agreement signed 02/18/2019     Priority: Medium    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     Priority: Medium    Chronic bilateral low back pain with right-sided sciatica 01/04/2019     Priority: Medium    Raynaud's disease without gangrene 01/04/2019     Priority: Medium    Chronic constipation 01/04/2019     Priority: Medium    Pain of right hand 01/04/2019     Priority: Medium      Past Medical History:   Diagnosis Date    Chronic bilateral low back pain with right-sided sciatica 1/4/2019    Chronic constipation 1/4/2019    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    Osteoarthritis     Pain of right hand 1/4/2019    Raynaud's disease without gangrene 1/4/2019     Past Surgical History:   Procedure Laterality Date    BACK SURGERY      HIP SURGERY      PICC  10/25/2020         REMOVE HARDWARE LOWER EXTREMITY  10/22/2020    Procedure: WITH REMOVAL OF METACARPOPHALANGEAL JOINT REPLACEMENT, PLACEMENT OF ANTIBIOTIC SPACER;  Surgeon: Reagan Dumont MD;  Location: [facility];  Service: Orthopedics     Current Outpatient Medications   Medication Sig Dispense Refill    atorvastatin (LIPITOR) 10 MG tablet Take 1 tablet (10 mg) by mouth daily. 90 tablet 3    azelastine (ASTELIN) 0.1 % nasal spray 2 sprays each nostril 1-2x daily as needed for nasal congestion (use nightly for first 2 week) 30 mL 11    biotin 5 mg Tab [BIOTIN 5 MG TAB] Take 5 mg by mouth daily.      blood glucose (NO BRAND SPECIFIED) test strip Use to test blood sugar once times daily or as directed. To accompany: Blood Glucose Monitor Brands: per insurance. 100 strip 6    blood glucose monitoring (NO BRAND SPECIFIED) meter device kit Use to test blood sugar once times daily or as directed. Preferred blood glucose meter OR supplies to accompany:  Blood Glucose Monitor Brands: per insurance. 1 kit 0    calcium carbonate (OS-AMRISSA) 500 mg calcium (1,250 mg) chewable tablet [CALCIUM CARBONATE (OS-MARISSA) 500 MG CALCIUM (1,250 MG) CHEWABLE TABLET] Chew 2 tablets daily.       cholecalciferol, vitamin D3, 5,000 unit Tab [CHOLECALCIFEROL, VITAMIN D3, 5,000 UNIT TAB] Take 5,000 Units by mouth daily.      clobetasol (TEMOVATE) 0.05 % external ointment APPLY A THIN LAYER TO THE AFFECTED AREA(S) daily for 2wks, then prn therafter      docusate sodium (COLACE) 100 MG capsule [DOCUSATE SODIUM (COLACE) 100 MG CAPSULE] Take 300 mg by mouth every evening.      escitalopram (LEXAPRO) 20 MG tablet Take 1 tablet (20 mg) by mouth daily. 90 tablet 2    gabapentin (NEURONTIN) 100 MG capsule Take 3 capsules (300 mg) by mouth at bedtime 270 capsule 3    glucosamine/chondr wright A sod (OSTEO BI-FLEX ORAL) [GLUCOSAMINE/CHONDR WRIGHT A SOD (OSTEO BI-FLEX ORAL)] Take 1 tablet by mouth daily.      HYDROcodone-acetaminophen (NORCO) 5-325 MG tablet Take 1 tablet by mouth every 6 hours as needed for severe pain. 30 tablet 0    Lancets (ONETOUCH DELICA PLUS [id number]) MISC 1 Device daily. 200 each 6    lysine 500 mg Tab [LYSINE 500 MG TAB] Take 500 mg by mouth daily.      mv-min/iron/folic/calcium/vitK (WOMEN'S 50 PLUS MULTIVIT-IRON ORAL) [MV-MIN/IRON/FOLIC/CALCIUM/VITK (WOMEN'S 50 PLUS MULTIVIT-IRON ORAL)] Take 1 tablet by mouth daily.      naproxen sodium (ALEVE) 220 MG tablet [NAPROXEN SODIUM (ALEVE) 220 MG TABLET] Take 660 mg by mouth every 12 (twelve) hours as needed for pain.      peg 400-propylene glycol (SYSTANE, PROPYLENE GLYCOL,) 0.4-0.3 % Drop [-PROPYLENE GLYCOL (SYSTANE, PROPYLENE GLYCOL,) 0.4-0.3 % DROP] Administer 1 drop to both eyes 4 (four) times a day as needed.      TURMERIC ORAL [TURMERIC ORAL] Take 1 capsule by mouth daily.      valACYclovir (VALTREX) 500 MG tablet Take 1 tablet (500 mg) by mouth daily. 120 tablet 0       Allergies   Allergen Reactions    Reglan  "[Metoclopramide] Unknown     Muscles     Reglan [Metoclopramide] Other (See Comments)     dystonia    Erythromycin Nausea and Vomiting and Rash    Sulfa Antibiotics Rash    Tetracycline Nausea and Vomiting and Rash        Social History     Tobacco Use    Smoking status: Never    Smokeless tobacco: Never   Substance Use Topics    Alcohol use: Yes     Comment: 2-4/ week     Family History   Problem Relation Age of Onset    Dementia Mother         86    Cancer Mother     Breast Cancer Mother 60.00    Cancer Maternal Grandmother     Cancer Paternal Grandmother      History   Drug Use No             Review of Systems  Constitutional, HEENT, cardiovascular, pulmonary, GI, , musculoskeletal, neuro, skin, endocrine and psych systems are negative, except as otherwise noted.    Objective    /68 (BP Location: Left arm, Patient Position: Sitting, Cuff Size: Adult Regular)   Pulse 75   Resp 16   Ht 1.651 m (5' 5\")   Wt 56.5 kg (124 lb 9.6 oz)   LMP  (LMP Unknown)   SpO2 98%   BMI 20.73 kg/m     Estimated body mass index is 20.73 kg/m  as calculated from the following:    Height as of this encounter: 1.651 m (5' 5\").    Weight as of this encounter: 56.5 kg (124 lb 9.6 oz).  Physical Exam  GENERAL: alert and no distress  EYES: Eyes grossly normal to inspection, PERRL and conjunctivae and sclerae normal  HENT: ear canals and TM's normal, nose and mouth without ulcers or lesions  NECK: no adenopathy, no asymmetry, masses, or scars  RESP: lungs clear to auscultation - no rales, rhonchi or wheezes  CV: regular rate and rhythm, normal S1 S2, no S3 or S4, no murmur, click or rub, no peripheral edema  ABDOMEN: soft, nontender, no hepatosplenomegaly, no masses and bowel sounds normal  MS: no gross musculoskeletal defects noted, no edema  Diabetic foot exam: normal DP and PT pulses, no trophic changes or ulcerative lesions, normal sensory exam, and normal monofilament exam    Recent Labs   Lab Test 12/23/24  1640   HGB 13.3 "         POTASSIUM 4.3   CR 0.69   A1C 6.9*        Diagnostics  No labs were ordered during this visit.   EKG: sinus bradycardia, normal axis, normal intervals, no acute ST/T changes c/w ischemia, no LVH by voltage criteria, unchanged from previous tracings    Revised Cardiac Risk Index (RCRI)  The patient has the following serious cardiovascular risks for perioperative complications:   - No serious cardiac risks = 0 points     RCRI Interpretation: 0 points: Class I (very low risk - 0.4% complication rate)         Signed Electronically by: Salima Lockhart MD  A copy of this evaluation report is provided to the requesting physician.

## 2025-03-04 NOTE — PATIENT INSTRUCTIONS
How to Take Your Medication Before Surgery  Preoperative Medication Instructions   Antiplatelet or Anticoagulation Medication Instructions   - We reviewed the medication list and the patient is not on an antiplatelet or anticoagulation medications.    Additional Medication Instructions  Take all scheduled medications on the day of surgery EXCEPT for modifications listed below:   - Herbal medications and vitamins: DO NOT TAKE 14 days prior to surgery.       Patient Education   Preparing for Your Surgery  For Adults  Getting started  In most cases, a nurse will call to review your health history and instructions. They will give you an arrival time based on your scheduled surgery time. Please be ready to share:  Your doctor's clinic name and phone number  Your medical, surgical, and anesthesia history  A list of allergies and sensitivities  A list of medicines, including herbal treatments and over-the-counter drugs  Whether the patient has a legal guardian (ask how to send us the papers in advance)  Note: You may not receive a call if you were seen at our PAC (Preoperative Assessment Center).  Please tell us if you're pregnant--or if there's any chance you might be pregnant. Some surgeries may injure a fetus (unborn baby), so they require a pregnancy test. Surgeries that are safe for a fetus don't always need a test, and you can choose whether to have one.   Preparing for surgery  Within 10 to 30 days of surgery: Have a pre-op exam (sometimes called an H&P, or History and Physical). This can be done at a clinic or pre-operative center.  If you're having a , you may not need this exam. Talk to your care team.  At your pre-op exam, talk to your care team about all medicines you take. (This includes CBD oil and any drugs, such as THC, marijuana, and other forms of cannabis.) If you need to stop any medicine before surgery, ask when to start taking it again.  This is for your safety. Many medicines and drugs can  make you bleed too much during surgery. Some change how well surgery (anesthesia) drugs work.  Call your insurance company to let them know you're having surgery. (If you don't have insurance, call [phone].)  Call your clinic if there's any change in your health. This includes a scrape or scratch near the surgery site, or any signs of a cold (sore throat, runny nose, cough, rash, fever).  Eating and drinking guidelines  For your safety: Unless your surgeon tells you otherwise, follow the guidelines below.  Eat and drink as normal until 8 hours before you arrive for surgery. After that, no food or milk. You can spit out gum when you arrive.  Drink clear liquids until 2 hours before you arrive. These are liquids you can see through, like water, Gatorade, and Propel Water. They also include plain black coffee and tea (no cream or milk).  No alcohol for 24 hours before you arrive. The night before surgery, stop any drinks that contain THC.  If your care team tells you to take medicine on the morning of surgery, it's okay to take it with a sip of water. No other medicines or drugs are allowed (including CBD oil)--follow your care team's instructions.  If you have questions the day of surgery, call your hospital or surgery center.   Preventing infection  Shower or bathe the night before and the morning of surgery. Follow the instructions your clinic gave you. (If no instructions, use regular soap.)  Don't shave or clip hair near your surgery site. We'll remove the hair if needed.  Don't smoke or vape the morning of surgery. No chewing tobacco for 6 hours before you arrive. A nicotine patch is okay. You may spit out nicotine gum when you arrive.  For some surgeries, the surgeon will tell you to fully quit smoking and nicotine.  We will make every effort to keep you safe from infection. We will:  Clean our hands often with soap and water (or an alcohol-based hand rub).  Clean the skin at your surgery site with a  special soap that kills germs.  Give you a special gown to keep you warm. (Cold raises the risk of infection.)  Wear hair covers, masks, gowns, and gloves during surgery.  Give antibiotic medicine, if prescribed. Not all surgeries need this medicine.  What to bring on the day of surgery  Photo ID and insurance card  Copy of your health care directive, if you have one  Glasses and hearing aids (bring cases)  You can't wear contacts during surgery  Inhaler and eye drops, if you use them (tell us about these when you arrive)  CPAP machine or breathing device, if you use them  A few personal items, if spending the night  If you have . . .  A pacemaker, ICD (cardiac defibrillator), or other implant: Bring the ID card.  An implanted stimulator: Bring the remote control.  A legal guardian: Bring a copy of the certified (court-stamped) guardianship papers.  Please remove any jewelry, including body piercings. Leave jewelry and other valuables at home.  If you're going home the day of surgery  You must have a responsible adult drive you home. They should stay with you overnight as well.  If you don't have someone to stay with you, and you aren't safe to go home alone, we may keep you overnight. Insurance often won't pay for this.  After surgery  If it's hard to control your pain or you need more pain medicine, please call your surgeon's office.  Questions?   If you have any questions for your care team, list them here:   ____________________________________________________________________________________________________________________________________________________________________________________________________________________________________________________________  For informational purposes only. Not to replace the advice of your health care provider. Copyright   2003, 2019 [facility]. All rights reserved. Clinically reviewed by Gil Oates MD. [employer] [id number] - REV 08/24.

## 2025-03-06 LAB
ATRIAL RATE - MUSE: 59 BPM
DIASTOLIC BLOOD PRESSURE - MUSE: NORMAL MMHG
INTERPRETATION ECG - MUSE: NORMAL
P AXIS - MUSE: 60 DEGREES
PR INTERVAL - MUSE: 164 MS
QRS DURATION - MUSE: 74 MS
QT - MUSE: 408 MS
QTC - MUSE: 403 MS
R AXIS - MUSE: 50 DEGREES
SYSTOLIC BLOOD PRESSURE - MUSE: NORMAL MMHG
T AXIS - MUSE: 63 DEGREES
VENTRICULAR RATE- MUSE: 59 BPM

## 2025-03-06 PROCEDURE — 93010 ELECTROCARDIOGRAM REPORT: CPT | Performed by: INTERNAL MEDICINE

## 2025-03-26 ENCOUNTER — TRANSFERRED RECORDS (OUTPATIENT)
Dept: HEALTH INFORMATION MANAGEMENT | Facility: CLINIC | Age: 65
End: 2025-03-26
Payer: COMMERCIAL

## 2025-03-31 DIAGNOSIS — E11.9 DIABETES MELLITUS, NEW ONSET (H): ICD-10-CM

## 2025-03-31 NOTE — TELEPHONE ENCOUNTER
Pharmacy requesting new script reflecting patient is testing 2-3 times a day. Previous script states only once a day and insurance wont pay for refill until 4/30. Patient running out due to testing more often.

## 2025-04-07 DIAGNOSIS — E11.9 DIABETES MELLITUS, NEW ONSET (H): Primary | ICD-10-CM

## 2025-04-09 ENCOUNTER — ALLIED HEALTH/NURSE VISIT (OUTPATIENT)
Dept: EDUCATION SERVICES | Facility: CLINIC | Age: 65
End: 2025-04-09
Payer: COMMERCIAL

## 2025-04-09 VITALS — WEIGHT: 122.5 LBS | BODY MASS INDEX: 20.39 KG/M2

## 2025-04-09 DIAGNOSIS — E11.9 DIABETES MELLITUS, NEW ONSET (H): ICD-10-CM

## 2025-04-09 LAB
EST. AVERAGE GLUCOSE BLD GHB EST-MCNC: 131 MG/DL
HBA1C MFR BLD: 6.2 % (ref 0–5.6)

## 2025-04-09 PROCEDURE — 36415 COLL VENOUS BLD VENIPUNCTURE: CPT

## 2025-04-09 PROCEDURE — G0108 DIAB MANAGE TRN  PER INDIV: HCPCS | Mod: AE

## 2025-04-09 PROCEDURE — 83036 HEMOGLOBIN GLYCOSYLATED A1C: CPT

## 2025-04-09 NOTE — PATIENT INSTRUCTIONS
1. Eat 3 balanced meals each day - Monitor carb intake and limit to 45-60 grams per meal  This would be equal to 3-4 choices ~  1 choice = 15 grams    Do not wait longer than 4-5 hours to eat something  Snacks limit to no more than 30 grams of carbohydrates or 2 choices  Make sure you include protein source with each meal and at bedtime - this has been shown to help with blood glucose elevations    2.  Check blood sugars once each day - please try and alternate the times you check between am fasting( right after you wake before eating) and 2 hours AFTER dinner - this allows us to get a better idea of what is happening throughout your day , not at just one time     Fasting and before meal target is 80 - 130   2 hours after a meal target is < 180  remember to bring meter and log book to all appointments    3. Incorporate 30 minutes activity into each day - does not need to be all at one time & walking counts    A1c today was 6.2%  WAAAAAAAAAAA HOOOOOOOOOOO !!!!       Thank you for coming in to see me today !      I value your experience and would be very thankful for your time in providing feedback in our clinic survey.    You may receive an e-mail, text message or even something in the mail from [facility] [email or web address].  This is a survey to let us know how we are doing - the survey will be related to your diabetes education and me.

## 2025-04-09 NOTE — LETTER
4/9/2025         RE: Paulina Scott  [address] [facility] [address]        Dear Colleague,    Thank you for referring your patient, Paulina Scott, to the [facility]. Please see a copy of my visit note below.    Diabetes Self-Management Education & Support    Presents for: Individual review    Type of Service: In Person Visit      Assessment  Paulina is a very pleasant 64-year-old who returns to clinic today for follow-up after receiving initial education for new diagnosis of DMT2 on 2/5/2025.  She arrived today accompanied by her friend Shu and had her logbook with her.  As had been requested Paulina has been checking her blood glucose daily.  She is eating 3 meals daily which all appear to be well-balanced and eaten in a timely fashion.  Prior to her visit today she was due for an A1c check so this was done and it returned back lower from her previous 6.9% to 6.2% today.  I congratulated her on this, commended her on her effort and encouraged her to keep up the good work.  Overall Paulina has been doing exceptionally well with her diabetes management.  She is doing this without any assistance of a diabetes medication.  Despite having, and recovering from foot surgery last month she is walking daily and keeping active.  Paulina stated she had no pertinent questions or concerns for me today so we agreed to meet back in clinic around the next time her A1c is due and I instructed her to call with any questions or concerns that come up before that time.    Patient's most recent   Lab Results   Component Value Date    A1C 6.2 04/09/2025     is meeting goal of <7.0    Diabetes knowledge and skills assessment:   Patient is knowledgeable in diabetes management concepts related to: Healthy Eating, Being Active, Monitoring, Reducing Risks, and Healthy Coping    Based on learning assessment above, most appropriate setting for further diabetes education would be: Individual  setting.    PLAN  1. Eat 3 balanced meals each day - Monitor carb intake and limit to 45-60 grams per meal  This would be equal to 3-4 choices ~  1 choice = 15 grams    Do not wait longer than 4-5 hours to eat something  Snacks limit to no more than 30 grams of carbohydrates or 2 choices  Make sure you include protein source with each meal and at bedtime - this has been shown to help with blood glucose elevations    2.  Check blood sugars once each day - please try and alternate the times you check between am fasting( right after you wake before eating) and 2 hours AFTER dinner - this allows us to get a better idea of what is happening throughout your day , not at just one time     Fasting and before meal target is 80 - 130   2 hours after a meal target is < 180  remember to bring meter and log book to all appointments    3. Incorporate 30 minutes activity into each day - does not need to be all at one time & walking counts    Topics to cover at upcoming visits: Healthy Eating, Being Active, Reducing Risks, and Healthy Coping    Follow-up: 7/30/25    See Care Plan for co-developed, patient-state behavior change goals.  AVS provided for patient today.    Education Materials Provided:  No new materials provided today      SUBJECTIVE/OBJECTIVE:  Presents for: Individual review  Accompanied by: Self, Other (friend (Shu))  Diabetes education in the past 24 mo: Yes ([facility] 2/5/25)  Focus of Visit: Self-care behavioral goal setting, Assistance w/ making life changes, Healthy Coping, Other (follow up after initial education)  Diabetes type: Type 2  Disease course: Improving  How confident are you filling out medical forms by yourself:: Extremely  Transportation concerns: No  Difficulty affording diabetes testing supplies?: No  Other concerns:: None  Cultural Influences/Ethnic Background:  Not  or       Diabetes Symptoms & Complications:  Weight trend: Stable  Disease course: Improving  Complications assessed  "today?: No    Patient Problem List and Family Medical History reviewed for relevant medical history, current medical status, and diabetes risk factors.    Vitals:  Wt 55.6 kg (122 lb 8 oz)   LMP  (LMP Unknown)   BMI 20.39 kg/m    Estimated body mass index is 20.39 kg/m  as calculated from the following:    Height as of 3/4/25: 1.651 m (5' 5\").    Weight as of this encounter: 55.6 kg (122 lb 8 oz).   Last 3 BP:   BP Readings from Last 3 Encounters:   03/04/25 125/68   12/23/24 138/74   03/25/24 130/72       History   Smoking Status     Never   Smokeless Tobacco     Never       Labs:  Lab Results   Component Value Date    A1C 6.2 04/09/2025     Lab Results   Component Value Date     12/23/2024     08/16/2021     Lab Results   Component Value Date     12/23/2024     Direct Measure HDL   Date Value Ref Range Status   12/23/2024 95 >=50 mg/dL Final   ]  GFR Estimate   Date Value Ref Range Status   12/23/2024 >90 >60 mL/min/1.73m2 Final     Comment:     eGFR calculated using 2021 CKD-EPI equation.   12/17/2020 >60 >60 mL/min/1.73m2 Final     GFR Estimate If Black   Date Value Ref Range Status   12/17/2020 >60 >60 mL/min/1.73m2 Final     Lab Results   Component Value Date    CR 0.69 12/23/2024     No results found for: \"MICROALBUMIN\"    Healthy Eating:  Healthy Eating Assessed Today: Yes  Cultural/Congregational diet restrictions?: No  Do you have any food allergies or intolerances?: No  Meal planning/habits: Smaller portions  Who cooks/prepares meals for you?: Self  Who purchases food in  your home?: Self  Meals include: Breakfast, Lunch, Dinner, Afternoon Snack, Evening Snack  Breakfast: Vanilla Greek yogurt with berries or a protein granola bar with half an apple or scrambled eggs with cheese and some fruit  Lunch: Often might be just some lunch meat rolled up with cheese  Dinner: + Protein + vegetable sometimes starch last night was a chicken enchilada with a few tortilla chips and a scone for " dessert  Snacks: In the day might have kettle corn or handful of almonds in the evening, ice cone or cheese and crackers  Beverages: Coffee, Diet soda  Please elaborate:: Sugar-free ice tea  Has patient met with a dietitian in the past?: No    Being Active:  Being Active Assessed Today: Yes  Exercise:: Currently not exercising  Barrier to exercise: Physical limitation (Continues to recover from foot surgery done on 3/18/2025 and is currently wearing a boot and has been instructed to only heel walk.  Paulina does walk daily though and ambulates quite well with boot-just is unable to drive)    Monitoring:  Monitoring Assessed Today: Yes  Did patient bring glucose meter to appointment? :  (Had her logbook provided to her at last visit)  Times checking blood sugar at home (number): 2  Times checking blood sugar at home (per): Day  Blood glucose trend:  (BG is stable and WL)    Following are Paulina's blood glucose readings taken from her logbook today.  Most recent are listed first.    FB, 111, 112, 143, 127, 104, 118, 122, 110, 116  2-hour postmeal: 151, 183, 119, 114, 170, 139, 159, 143, 156, 150, 219 (food), 127, 119, 140, 104    Taking Medications:      Current Treatments: Diet    Problem Solving:  Problem Solving Assessed Today: Yes  Is the patient at risk for hypoglycemia?: No  Is the patient at risk for DKA?: No  Does patient have severe weather/disaster plan for diabetes management?: Not Needed  Does patient have sick day plan for diabetes management?: Not Needed              Reducing Risks:  Reducing Risks Assessed Today: Yes  Diabetes Risks: Age over 45 years, Family History  CAD Risks: Diabetes Mellitus, Post-menopausal    Healthy Coping:  Healthy Coping Assessed Today: Yes  Emotional response to diabetes: Ready to learn  Informal Support system:: Family, Friends, Spouse  Stage of change: ACTION (Actively working towards change)  Support resources: In-person Offerings  Patient Activation Measure Survey  Score:       No data to display                  Care Plan and Education Provided:  Healthy Eating: Balanced meals, Carbohydrate Counting, Consistency in amount and timing of carbohydrate intake, Eating out, Label reading, and Portion control, Being Active: Amount recommended (150 minutes moderate or 75 minutes vigorous activity and 2-3 days strength training per week), Finding a physical activity routine that works for you, and Precautions to take with exercise, Monitoring: Frequency of monitoring and Individual glucose targets, Taking Medication: , Problem Solving: High glucose - causes, signs/symptoms, treatment and prevention, Reducing Risks: Complications of diabetes, Eye care, Foot care, Goal for A1c, how it relates to glucose and how often to check, Prevention, early diagnostic measures and treatment of complications, and A1C - goals, relating to blood glucose levels, how often to check, and Healthy Coping: Benefits of making appropriate lifestyle changes, Identifying helpful resources, and Utilize support systems    Thank you,  Calista Ornelas RN CDCES  Certified Diabetes Care and   Visit type : DSMT      Time Spent: 60 minutes  Encounter Type: Individual    Any diabetes medication dose changes were made via the CDE Protocol per the patient's referring provider and primary care provider. A copy of this encounter was shared with the provider.   Much or all of the text in this note was generated through the use of the Dragon Dictate voice-to-text software.Errors in spelling or words which seem out of context are unintentional. Sound alike errors, in particular, may have escaped editing.

## 2025-04-10 NOTE — PROGRESS NOTES
Diabetes Self-Management Education & Support    Presents for: Individual review    Type of Service: In Person Visit      Assessment  Paulina is a very pleasant 64-year-old who returns to clinic today for follow-up after receiving initial education for new diagnosis of DMT2 on 2/5/2025.  She arrived today accompanied by her friend Shu and had her logbook with her.  As had been requested Paulina has been checking her blood glucose daily.  She is eating 3 meals daily which all appear to be well-balanced and eaten in a timely fashion.  Prior to her visit today she was due for an A1c check so this was done and it returned back lower from her previous 6.9% to 6.2% today.  I congratulated her on this, commended her on her effort and encouraged her to keep up the good work.  Overall Paulina has been doing exceptionally well with her diabetes management.  She is doing this without any assistance of a diabetes medication.  Despite having, and recovering from foot surgery last month she is walking daily and keeping active.  Paulina stated she had no pertinent questions or concerns for me today so we agreed to meet back in clinic around the next time her A1c is due and I instructed her to call with any questions or concerns that come up before that time.    Patient's most recent   Lab Results   Component Value Date    A1C 6.2 04/09/2025     is meeting goal of <7.0    Diabetes knowledge and skills assessment:   Patient is knowledgeable in diabetes management concepts related to: Healthy Eating, Being Active, Monitoring, Reducing Risks, and Healthy Coping    Based on learning assessment above, most appropriate setting for further diabetes education would be: Individual setting.    PLAN  1. Eat 3 balanced meals each day - Monitor carb intake and limit to 45-60 grams per meal  This would be equal to 3-4 choices ~  1 choice = 15 grams    Do not wait longer than 4-5 hours to eat something  Snacks limit to no more than 30 grams of  carbohydrates or 2 choices  Make sure you include protein source with each meal and at bedtime - this has been shown to help with blood glucose elevations    2.  Check blood sugars once each day - please try and alternate the times you check between am fasting( right after you wake before eating) and 2 hours AFTER dinner - this allows us to get a better idea of what is happening throughout your day , not at just one time     Fasting and before meal target is 80 - 130   2 hours after a meal target is < 180  remember to bring meter and log book to all appointments    3. Incorporate 30 minutes activity into each day - does not need to be all at one time & walking counts    Topics to cover at upcoming visits: Healthy Eating, Being Active, Reducing Risks, and Healthy Coping    Follow-up: 7/30/25    See Care Plan for co-developed, patient-state behavior change goals.  AVS provided for patient today.    Education Materials Provided:  No new materials provided today      SUBJECTIVE/OBJECTIVE:  Presents for: Individual review  Accompanied by: Self, Other (friend (Shu))  Diabetes education in the past 24 mo: Yes ([facility] 2/5/25)  Focus of Visit: Self-care behavioral goal setting, Assistance w/ making life changes, Healthy Coping, Other (follow up after initial education)  Diabetes type: Type 2  Disease course: Improving  How confident are you filling out medical forms by yourself:: Extremely  Transportation concerns: No  Difficulty affording diabetes testing supplies?: No  Other concerns:: None  Cultural Influences/Ethnic Background:  Not  or       Diabetes Symptoms & Complications:  Weight trend: Stable  Disease course: Improving  Complications assessed today?: No    Patient Problem List and Family Medical History reviewed for relevant medical history, current medical status, and diabetes risk factors.    Vitals:  Wt 55.6 kg (122 lb 8 oz)   LMP  (LMP Unknown)   BMI 20.39 kg/m    Estimated body mass index is  "20.39 kg/m  as calculated from the following:    Height as of 3/4/25: 1.651 m (5' 5\").    Weight as of this encounter: 55.6 kg (122 lb 8 oz).   Last 3 BP:   BP Readings from Last 3 Encounters:   03/04/25 125/68   12/23/24 138/74   03/25/24 130/72       History   Smoking Status    Never   Smokeless Tobacco    Never       Labs:  Lab Results   Component Value Date    A1C 6.2 04/09/2025     Lab Results   Component Value Date     12/23/2024     08/16/2021     Lab Results   Component Value Date     12/23/2024     Direct Measure HDL   Date Value Ref Range Status   12/23/2024 95 >=50 mg/dL Final   ]  GFR Estimate   Date Value Ref Range Status   12/23/2024 >90 >60 mL/min/1.73m2 Final     Comment:     eGFR calculated using 2021 CKD-EPI equation.   12/17/2020 >60 >60 mL/min/1.73m2 Final     GFR Estimate If Black   Date Value Ref Range Status   12/17/2020 >60 >60 mL/min/1.73m2 Final     Lab Results   Component Value Date    CR 0.69 12/23/2024     No results found for: \"MICROALBUMIN\"    Healthy Eating:  Healthy Eating Assessed Today: Yes  Cultural/Mormonism diet restrictions?: No  Do you have any food allergies or intolerances?: No  Meal planning/habits: Smaller portions  Who cooks/prepares meals for you?: Self  Who purchases food in  your home?: Self  Meals include: Breakfast, Lunch, Dinner, Afternoon Snack, Evening Snack  Breakfast: Vanilla Greek yogurt with berries or a protein granola bar with half an apple or scrambled eggs with cheese and some fruit  Lunch: Often might be just some lunch meat rolled up with cheese  Dinner: + Protein + vegetable sometimes starch last night was a chicken enchilada with a few tortilla chips and a scone for dessert  Snacks: In the day might have kettle corn or handful of almonds in the evening, ice cone or cheese and crackers  Beverages: Coffee, Diet soda  Please elaborate:: Sugar-free ice tea  Has patient met with a dietitian in the past?: No    Being Active:  Being " Active Assessed Today: Yes  Exercise:: Currently not exercising  Barrier to exercise: Physical limitation (Continues to recover from foot surgery done on 3/18/2025 and is currently wearing a boot and has been instructed to only heel walk.  Paulnia does walk daily though and ambulates quite well with boot-just is unable to drive)    Monitoring:  Monitoring Assessed Today: Yes  Did patient bring glucose meter to appointment? :  (Had her logbook provided to her at last visit)  Times checking blood sugar at home (number): 2  Times checking blood sugar at home (per): Day  Blood glucose trend:  (BG is stable and WL)    Following are Paulina's blood glucose readings taken from her logbook today.  Most recent are listed first.    FB, 111, 112, 143, 127, 104, 118, 122, 110, 116  2-hour postmeal: 151, 183, 119, 114, 170, 139, 159, 143, 156, 150, 219 (food), 127, 119, 140, 104    Taking Medications:      Current Treatments: Diet    Problem Solving:  Problem Solving Assessed Today: Yes  Is the patient at risk for hypoglycemia?: No  Is the patient at risk for DKA?: No  Does patient have severe weather/disaster plan for diabetes management?: Not Needed  Does patient have sick day plan for diabetes management?: Not Needed              Reducing Risks:  Reducing Risks Assessed Today: Yes  Diabetes Risks: Age over 45 years, Family History  CAD Risks: Diabetes Mellitus, Post-menopausal    Healthy Coping:  Healthy Coping Assessed Today: Yes  Emotional response to diabetes: Ready to learn  Informal Support system:: Family, Friends, Spouse  Stage of change: ACTION (Actively working towards change)  Support resources: In-person Offerings  Patient Activation Measure Survey Score:       No data to display                  Care Plan and Education Provided:  Healthy Eating: Balanced meals, Carbohydrate Counting, Consistency in amount and timing of carbohydrate intake, Eating out, Label reading, and Portion control, Being Active: Amount  recommended (150 minutes moderate or 75 minutes vigorous activity and 2-3 days strength training per week), Finding a physical activity routine that works for you, and Precautions to take with exercise, Monitoring: Frequency of monitoring and Individual glucose targets, Taking Medication: , Problem Solving: High glucose - causes, signs/symptoms, treatment and prevention, Reducing Risks: Complications of diabetes, Eye care, Foot care, Goal for A1c, how it relates to glucose and how often to check, Prevention, early diagnostic measures and treatment of complications, and A1C - goals, relating to blood glucose levels, how often to check, and Healthy Coping: Benefits of making appropriate lifestyle changes, Identifying helpful resources, and Utilize support systems    Thank you,  Calista Ornelas RN [facility]  Certified Diabetes Care and   Visit type : DSMT      Time Spent: 60 minutes  Encounter Type: Individual    Any diabetes medication dose changes were made via the CDE Protocol per the patient's referring provider and primary care provider. A copy of this encounter was shared with the provider.   Much or all of the text in this note was generated through the use of the Dragon Dictate voice-to-text software.Errors in spelling or words which seem out of context are unintentional. Sound alike errors, in particular, may have escaped editing.

## 2025-04-13 ENCOUNTER — HEALTH MAINTENANCE LETTER (OUTPATIENT)
Age: 65
End: 2025-04-13

## 2025-04-21 ENCOUNTER — TRANSFERRED RECORDS (OUTPATIENT)
Dept: HEALTH INFORMATION MANAGEMENT | Facility: CLINIC | Age: 65
End: 2025-04-21
Payer: COMMERCIAL

## 2025-04-24 DIAGNOSIS — F11.90 CHRONIC, CONTINUOUS USE OF OPIOIDS: ICD-10-CM

## 2025-04-24 RX ORDER — HYDROCODONE BITARTRATE AND ACETAMINOPHEN 5; 325 MG/1; MG/1
1 TABLET ORAL EVERY 6 HOURS PRN
Qty: 30 TABLET | Refills: 0 | Status: SHIPPED | OUTPATIENT
Start: 2025-04-24

## 2025-04-30 ENCOUNTER — TRANSFERRED RECORDS (OUTPATIENT)
Dept: HEALTH INFORMATION MANAGEMENT | Facility: CLINIC | Age: 65
End: 2025-04-30

## 2025-05-08 ENCOUNTER — LAB (OUTPATIENT)
Dept: LAB | Facility: CLINIC | Age: 65
End: 2025-05-08
Payer: COMMERCIAL

## 2025-05-08 DIAGNOSIS — E11.9 DIABETES MELLITUS, NEW ONSET (H): ICD-10-CM

## 2025-05-08 LAB
ALT SERPL W P-5'-P-CCNC: <5 U/L (ref 0–50)
CHOLEST SERPL-MCNC: 192 MG/DL
CREAT UR-MCNC: 78.6 MG/DL
FASTING STATUS PATIENT QL REPORTED: YES
HDLC SERPL-MCNC: 100 MG/DL
LDLC SERPL CALC-MCNC: 74 MG/DL
MICROALBUMIN UR-MCNC: <12 MG/L
MICROALBUMIN/CREAT UR: NORMAL MG/G{CREAT}
NONHDLC SERPL-MCNC: 92 MG/DL
TRIGL SERPL-MCNC: 92 MG/DL

## 2025-05-09 ENCOUNTER — RESULTS FOLLOW-UP (OUTPATIENT)
Dept: FAMILY MEDICINE | Facility: CLINIC | Age: 65
End: 2025-05-09

## 2025-05-22 DIAGNOSIS — F11.90 CHRONIC, CONTINUOUS USE OF OPIOIDS: ICD-10-CM

## 2025-05-22 RX ORDER — HYDROCODONE BITARTRATE AND ACETAMINOPHEN 5; 325 MG/1; MG/1
1 TABLET ORAL EVERY 6 HOURS PRN
Qty: 30 TABLET | Refills: 0 | Status: SHIPPED | OUTPATIENT
Start: 2025-05-22

## 2025-06-17 DIAGNOSIS — F11.90 CHRONIC, CONTINUOUS USE OF OPIOIDS: ICD-10-CM

## 2025-06-17 RX ORDER — HYDROCODONE BITARTRATE AND ACETAMINOPHEN 5; 325 MG/1; MG/1
1 TABLET ORAL EVERY 6 HOURS PRN
Qty: 30 TABLET | Refills: 0 | OUTPATIENT
Start: 2025-06-17

## 2025-06-18 DIAGNOSIS — F11.90 CHRONIC, CONTINUOUS USE OF OPIOIDS: ICD-10-CM

## 2025-06-18 RX ORDER — HYDROCODONE BITARTRATE AND ACETAMINOPHEN 5; 325 MG/1; MG/1
1 TABLET ORAL EVERY 6 HOURS PRN
Qty: 30 TABLET | Refills: 0 | Status: SHIPPED | OUTPATIENT
Start: 2025-06-20

## 2025-06-18 NOTE — TELEPHONE ENCOUNTER
Previious request was denied because it was too early, Pt is not due for refill until 6/22 patient is heading out for vacation on Friday, patient is requesting refill for this Sunday, Can you put a request in for Tentative refill?

## 2025-06-30 ENCOUNTER — OFFICE VISIT (OUTPATIENT)
Dept: FAMILY MEDICINE | Facility: CLINIC | Age: 65
End: 2025-06-30
Payer: COMMERCIAL

## 2025-06-30 VITALS
OXYGEN SATURATION: 98 % | BODY MASS INDEX: 19.49 KG/M2 | SYSTOLIC BLOOD PRESSURE: 123 MMHG | RESPIRATION RATE: 16 BRPM | HEIGHT: 65 IN | DIASTOLIC BLOOD PRESSURE: 75 MMHG | HEART RATE: 70 BPM | WEIGHT: 117 LBS

## 2025-06-30 DIAGNOSIS — E11.9 CONTROLLED TYPE 2 DIABETES MELLITUS WITHOUT COMPLICATION, WITHOUT LONG-TERM CURRENT USE OF INSULIN (H): ICD-10-CM

## 2025-06-30 DIAGNOSIS — R25.2 LEG CRAMPS: ICD-10-CM

## 2025-06-30 DIAGNOSIS — M54.41 CHRONIC BILATERAL LOW BACK PAIN WITH RIGHT-SIDED SCIATICA: Primary | ICD-10-CM

## 2025-06-30 DIAGNOSIS — G89.29 CHRONIC BILATERAL LOW BACK PAIN WITH RIGHT-SIDED SCIATICA: Primary | ICD-10-CM

## 2025-06-30 PROBLEM — M81.0 OSTEOPOROSIS: Status: RESOLVED | Noted: 2021-08-16 | Resolved: 2025-06-30

## 2025-06-30 PROCEDURE — 99213 OFFICE O/P EST LOW 20 MIN: CPT | Performed by: FAMILY MEDICINE

## 2025-06-30 PROCEDURE — 3074F SYST BP LT 130 MM HG: CPT | Performed by: FAMILY MEDICINE

## 2025-06-30 PROCEDURE — 3078F DIAST BP <80 MM HG: CPT | Performed by: FAMILY MEDICINE

## 2025-06-30 PROCEDURE — G2211 COMPLEX E/M VISIT ADD ON: HCPCS | Performed by: FAMILY MEDICINE

## 2025-06-30 ASSESSMENT — ANXIETY QUESTIONNAIRES
4. TROUBLE RELAXING: NOT AT ALL
IF YOU CHECKED OFF ANY PROBLEMS ON THIS QUESTIONNAIRE, HOW DIFFICULT HAVE THESE PROBLEMS MADE IT FOR YOU TO DO YOUR WORK, TAKE CARE OF THINGS AT HOME, OR GET ALONG WITH OTHER PEOPLE: NOT DIFFICULT AT ALL
1. FEELING NERVOUS, ANXIOUS, OR ON EDGE: NOT AT ALL
GAD7 TOTAL SCORE: 0
3. WORRYING TOO MUCH ABOUT DIFFERENT THINGS: NOT AT ALL
7. FEELING AFRAID AS IF SOMETHING AWFUL MIGHT HAPPEN: NOT AT ALL
5. BEING SO RESTLESS THAT IT IS HARD TO SIT STILL: NOT AT ALL
6. BECOMING EASILY ANNOYED OR IRRITABLE: NOT AT ALL
2. NOT BEING ABLE TO STOP OR CONTROL WORRYING: NOT AT ALL
GAD7 TOTAL SCORE: 0
8. IF YOU CHECKED OFF ANY PROBLEMS, HOW DIFFICULT HAVE THESE MADE IT FOR YOU TO DO YOUR WORK, TAKE CARE OF THINGS AT HOME, OR GET ALONG WITH OTHER PEOPLE?: NOT DIFFICULT AT ALL
GAD7 TOTAL SCORE: 0
7. FEELING AFRAID AS IF SOMETHING AWFUL MIGHT HAPPEN: NOT AT ALL

## 2025-06-30 NOTE — PROGRESS NOTES
Assessment & Plan     ICD-10-CM    1. Chronic bilateral low back pain with right-sided sciatica  G89.29     M54.41       2. Leg cramps  R25.2       3. Controlled type 2 diabetes mellitus without complication, without long-term current use of insulin (H)  E11.9         Patient presents today for follow-up.  Her diabetes remains under good control with lifestyle modification.  She is having some leg cramps at night.  Printed educational materials provided.  OTC magnesium supplement advised.  Follow-up if not improving or worsening.  For her chronic low back pain with sciatica, continue exercise as before.  She is also on gabapentin and very limited hydrocodone once a day and stable.  Follow-up in 6 months for annual physical.    The longitudinal plan of care for the diagnosis(es)/condition(s) as documented were addressed during this visit. Due to the added complexity in care, I will continue to support Paulina in the subsequent management and with ongoing continuity of care.    Follow-up       Subjective   Paulina is a 65 year old, presenting for the following health issues:  Recheck Medication (Med check- 6 month )        6/30/2025     8:41 AM   Additional Questions   Roomed by Kellie Ortiz CMA     History of Present Illness       Hyperlipidemia:  She presents for follow up of hyperlipidemia.   She is taking medication to lower cholesterol. She is not having myalgia or other side effects to statin medications.    She eats 2-3 servings of fruits and vegetables daily.She consumes 0 sweetened beverage(s) daily.She exercises with enough effort to increase her heart rate 30 to 60 minutes per day.  She exercises with enough effort to increase her heart rate 4 days per week.   She is taking medications regularly.        Patient Active Problem List   Diagnosis    Congenital absence of vagina    Chronic bilateral low back pain with right-sided sciatica    Raynaud's disease without gangrene    Chronic constipation    Pain of  right hand    Narcotic drug use- For chronic back pain- # 30 pills /  month CSA/ [facility]- 08/2021    Controlled substance agreement signed    Abnormal mammogram    Polyarthralgia    Anxiety    PONV (postoperative nausea and vomiting)    Backache    Chronic, continuous use of opioids     Current Outpatient Medications   Medication Sig Dispense Refill    atorvastatin (LIPITOR) 10 MG tablet Take 1 tablet (10 mg) by mouth daily. 90 tablet 3    biotin 5 mg Tab [BIOTIN 5 MG TAB] Take 5 mg by mouth daily.      blood glucose (NO BRAND SPECIFIED) test strip 1 strip by In Vitro route 3 times daily. 100 strip 6    blood glucose monitoring (NO BRAND SPECIFIED) meter device kit Use to test blood sugar once times daily or as directed. Preferred blood glucose meter OR supplies to accompany: Blood Glucose Monitor Brands: per insurance. 1 kit 0    calcium carbonate (OS-MARISSA) 500 mg calcium (1,250 mg) chewable tablet [CALCIUM CARBONATE (OS-MARISSA) 500 MG CALCIUM (1,250 MG) CHEWABLE TABLET] Chew 2 tablets daily.       cholecalciferol, vitamin D3, 5,000 unit Tab [CHOLECALCIFEROL, VITAMIN D3, 5,000 UNIT TAB] Take 5,000 Units by mouth daily.      clobetasol (TEMOVATE) 0.05 % external ointment APPLY A THIN LAYER TO THE AFFECTED AREA(S) daily for 2wks, then prn therafter      docusate sodium (COLACE) 100 MG capsule [DOCUSATE SODIUM (COLACE) 100 MG CAPSULE] Take 300 mg by mouth every evening.      escitalopram (LEXAPRO) 20 MG tablet Take 1 tablet (20 mg) by mouth daily. 90 tablet 2    gabapentin (NEURONTIN) 100 MG capsule Take 3 capsules (300 mg) by mouth at bedtime 270 capsule 3    glucosamine/chondr wright A sod (OSTEO BI-FLEX ORAL) [GLUCOSAMINE/CHONDR WRIGHT A SOD (OSTEO BI-FLEX ORAL)] Take 1 tablet by mouth daily.      HYDROcodone-acetaminophen (NORCO) 5-325 MG tablet Take 1 tablet by mouth every 6 hours as needed for severe pain. 30 tablet 0    Lancets (ONETOUCH DELICA PLUS [id number]) MISC 1 Device daily. 200 each 6    lysine 500 mg Tab [LYSINE 500 MG  "TAB] Take 500 mg by mouth daily.      mv-min/iron/folic/calcium/vitK (WOMEN'S 50 PLUS MULTIVIT-IRON ORAL) [MV-MIN/IRON/FOLIC/CALCIUM/VITK (WOMEN'S 50 PLUS MULTIVIT-IRON ORAL)] Take 1 tablet by mouth daily.      naproxen sodium (ALEVE) 220 MG tablet [NAPROXEN SODIUM (ALEVE) 220 MG TABLET] Take 660 mg by mouth every 12 (twelve) hours as needed for pain.      peg 400-propylene glycol (SYSTANE, PROPYLENE GLYCOL,) 0.4-0.3 % Drop [-PROPYLENE GLYCOL (SYSTANE, PROPYLENE GLYCOL,) 0.4-0.3 % DROP] Administer 1 drop to both eyes 4 (four) times a day as needed.      TURMERIC ORAL [TURMERIC ORAL] Take 1 capsule by mouth daily.      azelastine (ASTELIN) 0.1 % nasal spray 2 sprays each nostril 1-2x daily as needed for nasal congestion (use nightly for first 2 week) (Patient not taking: Reported on 6/30/2025) 30 mL 11     No current facility-administered medications for this visit.         Review of Systems  Constitutional, neuro, ENT, endocrine, pulmonary, cardiac, gastrointestinal, genitourinary, musculoskeletal, integument and psychiatric systems are negative, except as otherwise noted.      Objective    /75 (BP Location: Left arm, Patient Position: Sitting, Cuff Size: Adult Small)   Pulse 70   Resp 16   Ht 1.651 m (5' 5\")   Wt 53.1 kg (117 lb)   LMP  (LMP Unknown)   SpO2 98%   BMI 19.47 kg/m    Body mass index is 19.47 kg/m .  Physical Exam   GENERAL: alert and no distress    Lab on 05/08/2025   Component Date Value Ref Range Status    Creatinine Urine mg/dL 05/08/2025 78.6  mg/dL Final    The reference ranges have not been established in urine creatinine. The results should be integrated into the clinical context for interpretation.    Albumin Urine mg/L 05/08/2025 <12.0  mg/L Final    The reference ranges have not been established in urine albumin. The results should be integrated into the clinical context for interpretation.    Albumin Urine mg/g Cr 05/08/2025    Final    Unable to calculate, urine albumin " and/or urine creatinine is outside detectable limits.  Microalbuminuria is defined as an albumin:creatinine ratio of 17 to 299 for males and 25 to 299 for females. A ratio of albumin:creatinine of 300 or higher is indicative of overt proteinuria.  Due to biologic variability, positive results should be confirmed by a second, first-morning random or 24-hour timed urine specimen. If there is discrepancy, a third specimen is recommended. When 2 out of 3 results are in the microalbuminuria range, this is evidence for incipient nephropathy and warrants increased efforts at glucose control, blood pressure control, and institution of therapy with an angiotensin-converting-enzyme (ACE) inhibitor (if the patient can tolerate it).      Cholesterol 05/08/2025 192  <200 mg/dL Final    Triglycerides 05/08/2025 92  <150 mg/dL Final    Direct Measure HDL 05/08/2025 100  >=50 mg/dL Final    LDL Cholesterol Calculated 05/08/2025 74  <100 mg/dL Final    Non HDL Cholesterol 05/08/2025 92  <130 mg/dL Final    Patient Fasting > 8hrs? 05/08/2025 Yes   Final    ALT 05/08/2025 <5  0 - 50 U/L Final           Signed Electronically by: Salima Lockhart MD

## 2025-07-24 DIAGNOSIS — F11.90 CHRONIC, CONTINUOUS USE OF OPIOIDS: ICD-10-CM

## 2025-07-24 RX ORDER — HYDROCODONE BITARTRATE AND ACETAMINOPHEN 5; 325 MG/1; MG/1
1 TABLET ORAL EVERY 6 HOURS PRN
Qty: 30 TABLET | Refills: 0 | Status: SHIPPED | OUTPATIENT
Start: 2025-07-24

## 2025-07-27 ENCOUNTER — HEALTH MAINTENANCE LETTER (OUTPATIENT)
Age: 65
End: 2025-07-27

## 2025-07-28 DIAGNOSIS — E11.9 DIABETES MELLITUS, NEW ONSET (H): Primary | ICD-10-CM

## 2025-07-30 ENCOUNTER — ALLIED HEALTH/NURSE VISIT (OUTPATIENT)
Dept: EDUCATION SERVICES | Facility: CLINIC | Age: 65
End: 2025-07-30
Payer: COMMERCIAL

## 2025-07-30 VITALS — WEIGHT: 116.2 LBS | BODY MASS INDEX: 19.34 KG/M2

## 2025-07-30 DIAGNOSIS — E11.9 DIABETES MELLITUS, NEW ONSET (H): ICD-10-CM

## 2025-07-30 LAB
EST. AVERAGE GLUCOSE BLD GHB EST-MCNC: 128 MG/DL
HBA1C MFR BLD: 6.1 % (ref 0–5.6)

## 2025-07-30 PROCEDURE — 83036 HEMOGLOBIN GLYCOSYLATED A1C: CPT

## 2025-07-30 PROCEDURE — G0108 DIAB MANAGE TRN  PER INDIV: HCPCS | Mod: AE

## 2025-07-30 PROCEDURE — 3044F HG A1C LEVEL LT 7.0%: CPT

## 2025-07-30 PROCEDURE — 36415 COLL VENOUS BLD VENIPUNCTURE: CPT

## 2025-07-30 NOTE — LETTER
7/30/2025         RE: Paulina Scott  [address] [facility] [address]        Dear Colleague,    Thank you for referring your patient, Paulina Scott, to the [facility]. Please see a copy of my visit note below.    Diabetes Self-Management Education & Support    Presents for: Individual review    Type of Service: In Person Visit      Assessment  Paulina is a very pleasant 65-year-old who returns to clinic today to review blood glucose, recheck A1c, and to assess/assist her with her diabetes self-management skills wearing  &  if needed.  She arrived today unaccompanied and had her glucose meter and logbook with her.  As had been requested she has been monitoring her blood glucose once daily, alternating times between a.m. fasting and 2 hours after dinner.  Blood glucose readings were reviewed and discussed with her during our visit today.  Paulina has been able to successfully manage her blood glucose with any assistance of any type of medication thus far, has been doing well.  Prior to our visit we did recheck her A1c and was pleased to see it returned even lower from her previous 6.2% to 6.1% today.  I congratulated her on this and encouraged her to keep up the good work.  She is keeping active on a daily basis and monitors her carbohydrate intake, watches portions, and is consistent with making sure her meals are well-balanced.  Overall she continues to do exceptionally well.  We agreed to meet back in clinic in 6 months and I instructed her to call with any questions or concerns that come up before that time.    Patient's most recent   Lab Results   Component Value Date    A1C 6.1 07/30/2025     is meeting goal of <7.0    Diabetes knowledge and skills assessment:   Patient is knowledgeable in diabetes management concepts related to: Healthy Eating, Being Active, Monitoring, Reducing Risks, and Healthy Coping    Based on learning assessment above, most appropriate setting for  further diabetes education would be: Individual setting.    PLAN  1. Eat 3 balanced meals each day - Monitor carb intake and limit to 45-60 grams per meal  This would be equal to 3-4 choices ~  1 choice = 15 grams    Do not wait longer than 4-5 hours to eat something  Snacks limit to no more than 30 grams of carbohydrates or 2 choices  **Make sure you include protein source with each meal and at bedtime - this has been shown to help with blood glucose elevations    2.  Check blood sugars once each day - please try and alternate the times you check between am fasting( right after you wake before eating) and 2 hours AFTER dinner - this allows us to get a better idea of what is happening throughout your day , not at just one time     Fasting and before meal target is 80 - 130   2 hours after a meal target is < 180  remember to bring meter and log book to all appointments    3. Incorporate 30 minutes activity into each day - does not need to be all at one time & walking counts  Plan  Topics to cover at upcoming visits: Healthy Eating, Being Active, Reducing Risks, and Healthy Coping    Follow-up: 1/5/26    See Care Plan for co-developed, patient-state behavior change goals.  AVS provided for patient today.    Education Materials Provided:  No new materials provided today      SUBJECTIVE/OBJECTIVE:  Presents for: Individual review  Accompanied by: Self  Diabetes education in the past 24 mo: Yes (LV 4/9/25)  Focus of Visit: Self-care behavioral goal setting, Assistance w/ making life changes, Reducing Risks, Healthy Coping  Diabetes type: Type 2  Date of diagnosis: initial education on 2/5/25  Disease course: Stable  How confident are you filling out medical forms by yourself:: Extremely  Diabetes management related comments/concerns: No  Transportation concerns: No  Other concerns: None  Cultural Influences/Ethnic Background:  Not  or       Diabetes Symptoms & Complications:  Diabetes Related Symptoms:  "None  Weight trend: Decreasing  Symptom course: Stable  Disease course: Stable  Complications assessed today?: No    Patient Problem List and Family Medical History reviewed for relevant medical history, current medical status, and diabetes risk factors.    Vitals:  Wt 52.7 kg (116 lb 3.2 oz)   LMP  (LMP Unknown)   BMI 19.34 kg/m    Estimated body mass index is 19.34 kg/m  as calculated from the following:    Height as of 6/30/25: 1.651 m (5' 5\").    Weight as of this encounter: 52.7 kg (116 lb 3.2 oz).   Last 3 BP:   BP Readings from Last 3 Encounters:   06/30/25 123/75   03/04/25 125/68   12/23/24 138/74       History   Smoking Status     Never   Smokeless Tobacco     Never       Labs:  Lab Results   Component Value Date    A1C 6.1 07/30/2025     Lab Results   Component Value Date     12/23/2024     08/16/2021     Lab Results   Component Value Date    LDL 74 05/08/2025     Direct Measure HDL   Date Value Ref Range Status   05/08/2025 100 >=50 mg/dL Final   ]  GFR Estimate   Date Value Ref Range Status   12/23/2024 >90 >60 mL/min/1.73m2 Final     Comment:     eGFR calculated using 2021 CKD-EPI equation.   12/17/2020 >60 >60 mL/min/1.73m2 Final     GFR Estimate If Black   Date Value Ref Range Status   12/17/2020 >60 >60 mL/min/1.73m2 Final     Lab Results   Component Value Date    CR 0.69 12/23/2024     No results found for: \"MICROALBUMIN\"    Healthy Eating:  Healthy Eating Assessed Today: Yes  Cultural/Denominational diet restrictions?: No  Do you have any food allergies or intolerances?: No  Meal planning/habits: Smaller portions, Avoiding sweets, Low carb  Who cooks/prepares meals for you?: Self  Who purchases food in  your home?: Self  How many times a week on average do you eat food made away from home (restaurant/take-out)?: 2  Meals include: Breakfast, Dinner, Afternoon Snack, Evening Snack  Breakfast: protein bar with 1/2 apple and PB or yogurt with granola or eggs and spinach  Lunch: if does not " skip might have lunch meat with cheese and handful of chips or snack type foods like pistachios or trail mix  Dinner: + protein + veg  50% time starch  Snacks: cheese and crackers, hummus, almonds  Beverages: Water, Tea, Coffee, Alcohol  Has patient met with a dietitian in the past?: No    Being Active:  Being Active Assessed Today: Yes  Exercise:: Yes (bikes 6 mi daily and swims laps 2-3x/week)  Days per week of moderate to strenuous exercise (like a brisk walk): 7  On average, minutes per day of exercise at this level: 60  How intense was your typical exercise? : Heavy (like jogging or swimming)  Exercise Minutes per Week: 420  Barrier to exercise: None    Monitoring:  Monitoring Assessed Today: Yes  Did patient bring glucose meter to appointment? : Yes  Blood Glucose Meter: One Touch  Times checking blood sugar at home (number): 1  Times checking blood sugar at home (per): Day  Blood glucose trend: No change    The following are Paulina's blood glucose readings taken from her logbook today.    FB.3, 37, 101, 111, 92, 95, 99, 115, 104, 93, 102, 94   2-hour post dinner: 144, 99, 165, 150, 176, 119, 148, 140, 28, 110, 100, 178, 140   glycemic control continues to be optimal        Taking Medication Assessed Today: No  Current Treatments: None    Problem Solving:  Problem Solving Assessed Today: Yes  Is the patient at risk for hypoglycemia?: No  Is the patient at risk for DKA?: No  Does patient have severe weather/disaster plan for diabetes management?: Not Needed  Does patient have sick day plan for diabetes management?: Not Needed              Reducing Risks:  Reducing Risks Assessed Today: Yes  Diabetes Risks: Age over 45 years  CAD Risks: Diabetes Mellitus  Has dilated eye exam at least once a year?: Yes  Sees dentist every 6 months?: Yes  Feet checked by healthcare provider in the last year?: Yes    Healthy Coping:  Healthy Coping Assessed Today: Yes  I feel burned out by all of the attention and effort that  diabetes demands of me.: 1 - Not a Problem  It bothers me that diabetes seems to control my life.: 2 - A Little Problem  I am frustrated that even when I do what I am supposed to for my diabetes, it doesn't seem to make a difference.: 1 - Not a Problem  No matter how hard I try with my diabetes, it feels like it will never be good enough.: 1 - Not a Problem  I am so tired of having to worry about diabetes all the time.: 1 - Not a Problem  When it comes to my diabetes, I often feel like a failure.: 1 - Not a Problem  It depresses me when I realize that my diabetes will likely never go away.: 1 - Not a Problem  Living with diabetes is overwhelming for me.: 1 - Not a Problem  T2 DDAS Total Score (0 - 1.9 Little or no DD, 2.0 - 2.9 Moderate DD,  3.0+ High DD): 1.1  Informal Support system:: Spouse  Patient Activation Measure Survey Score:       No data to display                  Care Plan and Education Provided:  Healthy Eating: Balanced meals, Consistency in amount and timing of carbohydrate intake, and Eating out  Being Active: Amount recommended (150 minutes moderate or 75 minutes vigorous activity and 2-3 days strength training per week) and Relationship of activity to glucose  Monitoring: Frequency of monitoring and Individual glucose targets  Reducing Risks: Eye care, Goal for A1c, how it relates to glucose and how often to check, Prevention, early diagnostic measures and treatment of complications, and A1C - goals, relating to blood glucose levels, how often to check  Healthy Coping: Benefits of making appropriate lifestyle changes, Identifying helpful resources, and Utilize support systems    Thank you,  Calista Ornelas RN [facility]  Certified Diabetes Care and   Visit type : DSMT      Time Spent: 60 minutes  Encounter Type: Individual    Any diabetes medication dose changes were made via the CDE Protocol per the patient's referring provider and primary care provider. A copy of this encounter was  shared with the provider.   Much or all of the text in this note was generated through the use of the Dragon Dictate voice-to-text software.Errors in spelling or words which seem out of context are unintentional. Sound alike errors, in particular, may have escaped editing.  Answers submitted by the patient for this visit:  Questionnaire about: Diabetes problem (Submitted on 7/25/2025)  Chief Complaint: Diabetes problem

## 2025-07-30 NOTE — PATIENT INSTRUCTIONS
1. Eat 3 balanced meals each day - Monitor carb intake and limit to 45-60 grams per meal  This would be equal to 3-4 choices ~  1 choice = 15 grams    Do not wait longer than 4-5 hours to eat something  Snacks limit to no more than 30 grams of carbohydrates or 2 choices  **Make sure you include protein source with each meal and at bedtime - this has been shown to help with blood glucose elevations    2.  Check blood sugars once each day - please try and alternate the times you check between am fasting( right after you wake before eating) and 2 hours AFTER dinner - this allows us to get a better idea of what is happening throughout your day , not at just one time     Fasting and before meal target is 80 - 130   2 hours after a meal target is < 180  remember to bring meter and log book to all appointments    3. Incorporate 30 minutes activity into each day - does not need to be all at one time & walking counts    Your A1c result today was 6.1%   YOU STILL GOT IT VAZQUEZ' GIRL !!!    Our Triage number for questions/ concerns or appointments [phone]  Thank you for coming in to see me today !      I value your experience and would be very thankful for your time in providing feedback in our clinic survey.    You may receive an e-mail, text message or even something in the mail from [employer].  This is a survey to let us know how we are doing - the survey will be related to your diabetes education and me.

## 2025-07-31 NOTE — PROGRESS NOTES
Diabetes Self-Management Education & Support    Presents for: Individual review    Type of Service: In Person Visit      Assessment  Paulina is a very pleasant 65-year-old who returns to clinic today to review blood glucose, recheck A1c, and to assess/assist her with her diabetes self-management skills wearing  &  if needed.  She arrived today unaccompanied and had her glucose meter and logbook with her.  As had been requested she has been monitoring her blood glucose once daily, alternating times between a.m. fasting and 2 hours after dinner.  Blood glucose readings were reviewed and discussed with her during our visit today.  Paulina has been able to successfully manage her blood glucose with any assistance of any type of medication thus far, has been doing well.  Prior to our visit we did recheck her A1c and was pleased to see it returned even lower from her previous 6.2% to 6.1% today.  I congratulated her on this and encouraged her to keep up the good work.  She is keeping active on a daily basis and monitors her carbohydrate intake, watches portions, and is consistent with making sure her meals are well-balanced.  Overall she continues to do exceptionally well.  We agreed to meet back in clinic in 6 months and I instructed her to call with any questions or concerns that come up before that time.    Patient's most recent   Lab Results   Component Value Date    A1C 6.1 07/30/2025     is meeting goal of <7.0    Diabetes knowledge and skills assessment:   Patient is knowledgeable in diabetes management concepts related to: Healthy Eating, Being Active, Monitoring, Reducing Risks, and Healthy Coping    Based on learning assessment above, most appropriate setting for further diabetes education would be: Individual setting.    PLAN  1. Eat 3 balanced meals each day - Monitor carb intake and limit to 45-60 grams per meal  This would be equal to 3-4 choices ~  1 choice = 15 grams    Do not wait longer than 4-5 hours to eat  something  Snacks limit to no more than 30 grams of carbohydrates or 2 choices  **Make sure you include protein source with each meal and at bedtime - this has been shown to help with blood glucose elevations    2.  Check blood sugars once each day - please try and alternate the times you check between am fasting( right after you wake before eating) and 2 hours AFTER dinner - this allows us to get a better idea of what is happening throughout your day , not at just one time     Fasting and before meal target is 80 - 130   2 hours after a meal target is < 180  remember to bring meter and log book to all appointments    3. Incorporate 30 minutes activity into each day - does not need to be all at one time & walking counts  Plan  Topics to cover at upcoming visits: Healthy Eating, Being Active, Reducing Risks, and Healthy Coping    Follow-up: 1/5/26    See Care Plan for co-developed, patient-state behavior change goals.  AVS provided for patient today.    Education Materials Provided:  No new materials provided today      SUBJECTIVE/OBJECTIVE:  Presents for: Individual review  Accompanied by: Self  Diabetes education in the past 24 mo: Yes (LV 4/9/25)  Focus of Visit: Self-care behavioral goal setting, Assistance w/ making life changes, Reducing Risks, Healthy Coping  Diabetes type: Type 2  Date of diagnosis: initial education on 2/5/25  Disease course: Stable  How confident are you filling out medical forms by yourself:: Extremely  Diabetes management related comments/concerns: No  Transportation concerns: No  Other concerns: None  Cultural Influences/Ethnic Background:  Not  or       Diabetes Symptoms & Complications:  Diabetes Related Symptoms: None  Weight trend: Decreasing  Symptom course: Stable  Disease course: Stable  Complications assessed today?: No    Patient Problem List and Family Medical History reviewed for relevant medical history, current medical status, and diabetes risk  "factors.    Vitals:  Wt 52.7 kg (116 lb 3.2 oz)   LMP  (LMP Unknown)   BMI 19.34 kg/m    Estimated body mass index is 19.34 kg/m  as calculated from the following:    Height as of 6/30/25: 1.651 m (5' 5\").    Weight as of this encounter: 52.7 kg (116 lb 3.2 oz).   Last 3 BP:   BP Readings from Last 3 Encounters:   06/30/25 123/75   03/04/25 125/68   12/23/24 138/74       History   Smoking Status    Never   Smokeless Tobacco    Never       Labs:  Lab Results   Component Value Date    A1C 6.1 07/30/2025     Lab Results   Component Value Date     12/23/2024     08/16/2021     Lab Results   Component Value Date    LDL 74 05/08/2025     Direct Measure HDL   Date Value Ref Range Status   05/08/2025 100 >=50 mg/dL Final   ]  GFR Estimate   Date Value Ref Range Status   12/23/2024 >90 >60 mL/min/1.73m2 Final     Comment:     eGFR calculated using 2021 CKD-EPI equation.   12/17/2020 >60 >60 mL/min/1.73m2 Final     GFR Estimate If Black   Date Value Ref Range Status   12/17/2020 >60 >60 mL/min/1.73m2 Final     Lab Results   Component Value Date    CR 0.69 12/23/2024     No results found for: \"MICROALBUMIN\"    Healthy Eating:  Healthy Eating Assessed Today: Yes  Cultural/Mosque diet restrictions?: No  Do you have any food allergies or intolerances?: No  Meal planning/habits: Smaller portions, Avoiding sweets, Low carb  Who cooks/prepares meals for you?: Self  Who purchases food in  your home?: Self  How many times a week on average do you eat food made away from home (restaurant/take-out)?: 2  Meals include: Breakfast, Dinner, Afternoon Snack, Evening Snack  Breakfast: protein bar with 1/2 apple and PB or yogurt with granola or eggs and spinach  Lunch: if does not skip might have lunch meat with cheese and handful of chips or snack type foods like pistachios or trail mix  Dinner: + protein + veg  50% time starch  Snacks: cheese and crackers, hummus, almonds  Beverages: Water, Tea, Coffee, Alcohol  Has " patient met with a dietitian in the past?: No    Being Active:  Being Active Assessed Today: Yes  Exercise:: Yes (bikes 6 mi daily and swims laps 2-3x/week)  Days per week of moderate to strenuous exercise (like a brisk walk): 7  On average, minutes per day of exercise at this level: 60  How intense was your typical exercise? : Heavy (like jogging or swimming)  Exercise Minutes per Week: 420  Barrier to exercise: None    Monitoring:  Monitoring Assessed Today: Yes  Did patient bring glucose meter to appointment? : Yes  Blood Glucose Meter: One Touch  Times checking blood sugar at home (number): 1  Times checking blood sugar at home (per): Day  Blood glucose trend: No change    The following are Paulina's blood glucose readings taken from her logbook today.    FB.3, 37, 101, 111, 92, 95, 99, 115, 104, 93, 102, 94   2-hour post dinner: 144, 99, 165, 150, 176, 119, 148, 140, 28, 110, 100, 178, 140   glycemic control continues to be optimal        Taking Medication Assessed Today: No  Current Treatments: None    Problem Solving:  Problem Solving Assessed Today: Yes  Is the patient at risk for hypoglycemia?: No  Is the patient at risk for DKA?: No  Does patient have severe weather/disaster plan for diabetes management?: Not Needed  Does patient have sick day plan for diabetes management?: Not Needed              Reducing Risks:  Reducing Risks Assessed Today: Yes  Diabetes Risks: Age over 45 years  CAD Risks: Diabetes Mellitus  Has dilated eye exam at least once a year?: Yes  Sees dentist every 6 months?: Yes  Feet checked by healthcare provider in the last year?: Yes    Healthy Coping:  Healthy Coping Assessed Today: Yes  I feel burned out by all of the attention and effort that diabetes demands of me.: 1 - Not a Problem  It bothers me that diabetes seems to control my life.: 2 - A Little Problem  I am frustrated that even when I do what I am supposed to for my diabetes, it doesn't seem to make a difference.: 1 - Not  a Problem  No matter how hard I try with my diabetes, it feels like it will never be good enough.: 1 - Not a Problem  I am so tired of having to worry about diabetes all the time.: 1 - Not a Problem  When it comes to my diabetes, I often feel like a failure.: 1 - Not a Problem  It depresses me when I realize that my diabetes will likely never go away.: 1 - Not a Problem  Living with diabetes is overwhelming for me.: 1 - Not a Problem  T2 DDAS Total Score (0 - 1.9 Little or no DD, 2.0 - 2.9 Moderate DD,  3.0+ High DD): 1.1  Informal Support system:: Spouse  Patient Activation Measure Survey Score:       No data to display                  Care Plan and Education Provided:  Healthy Eating: Balanced meals, Consistency in amount and timing of carbohydrate intake, and Eating out  Being Active: Amount recommended (150 minutes moderate or 75 minutes vigorous activity and 2-3 days strength training per week) and Relationship of activity to glucose  Monitoring: Frequency of monitoring and Individual glucose targets  Reducing Risks: Eye care, Goal for A1c, how it relates to glucose and how often to check, Prevention, early diagnostic measures and treatment of complications, and A1C - goals, relating to blood glucose levels, how often to check  Healthy Coping: Benefits of making appropriate lifestyle changes, Identifying helpful resources, and Utilize support systems    Thank you,  Calista Ornelas RN [facility]  Certified Diabetes Care and   Visit type : DSMT      Time Spent: 60 minutes  Encounter Type: Individual    Any diabetes medication dose changes were made via the CDE Protocol per the patient's referring provider and primary care provider. A copy of this encounter was shared with the provider.   Much or all of the text in this note was generated through the use of the Dragon Dictate voice-to-text software.Errors in spelling or words which seem out of context are unintentional. Sound alike errors, in  particular, may have escaped editing.  Answers submitted by the patient for this visit:  Questionnaire about: Diabetes problem (Submitted on 7/25/2025)  Chief Complaint: Diabetes problem

## 2025-08-26 DIAGNOSIS — Z00.00 ANNUAL PHYSICAL EXAM: ICD-10-CM

## 2025-08-26 RX ORDER — GABAPENTIN 100 MG/1
300 CAPSULE ORAL AT BEDTIME
Qty: 270 CAPSULE | Refills: 3 | Status: SHIPPED | OUTPATIENT
Start: 2025-08-26